# Patient Record
Sex: FEMALE | Race: WHITE | NOT HISPANIC OR LATINO | Employment: OTHER | URBAN - METROPOLITAN AREA
[De-identification: names, ages, dates, MRNs, and addresses within clinical notes are randomized per-mention and may not be internally consistent; named-entity substitution may affect disease eponyms.]

---

## 2017-03-24 DIAGNOSIS — M85.80 OSTEOPENIA: ICD-10-CM

## 2017-03-24 NOTE — TELEPHONE ENCOUNTER
Fosamax      Last Written Prescription Date: 3/21/2016  Last Fill Quantity: 4, # refills: 11  Last Office Visit with [facility], [facility] or [facility] prescribing provider: 11/7/2016     BP Readings from Last 3 Encounters:   11/07/16 132/68   10/30/16 121/52   10/24/16 134/70     Date of last Breast Exam: 4/2016

## 2017-03-27 DIAGNOSIS — E78.5 HYPERLIPIDEMIA LDL GOAL <100: ICD-10-CM

## 2017-03-27 DIAGNOSIS — E11.65 TYPE 2 DIABETES MELLITUS WITH HYPERGLYCEMIA, UNSPECIFIED LONG TERM INSULIN USE STATUS: ICD-10-CM

## 2017-03-28 RX ORDER — ALENDRONATE SODIUM 70 MG/1
70 TABLET ORAL
Qty: 4 TABLET | Refills: 6 | Status: SHIPPED | OUTPATIENT
Start: 2017-03-28 | End: 2017-10-06

## 2017-03-28 NOTE — TELEPHONE ENCOUNTER
Prescription approved per [facility] Refill Protocol.    Trenton Haro RN, BSN

## 2017-03-29 RX ORDER — SIMVASTATIN 10 MG
TABLET ORAL
Qty: 30 TABLET | Refills: 0 | Status: SHIPPED | OUTPATIENT
Start: 2017-03-29 | End: 2017-04-19

## 2017-03-29 NOTE — TELEPHONE ENCOUNTER
simvastatin (ZOCOR) 10 MG tablet   Last Written Prescription Date: 03/01/2017  Last Fill Quantity: 30, # refills: 0  Last Office Visit with [facility], [facility] or [facility] prescribing provider: 11/07/2016       Lab Results   Component Value Date    CHOL 139 01/20/2016     Lab Results   Component Value Date    HDL 42 01/20/2016     Lab Results   Component Value Date    LDL 54 01/20/2016     Lab Results   Component Value Date    TRIG 214 01/20/2016     Lab Results   Component Value Date    CHOLHDLRATIO 4.6 11/11/2015

## 2017-04-13 ENCOUNTER — TELEPHONE (OUTPATIENT)
Dept: INTERNAL MEDICINE | Facility: CLINIC | Age: 67
End: 2017-04-13

## 2017-04-13 DIAGNOSIS — E11.65 TYPE 2 DIABETES MELLITUS WITH HYPERGLYCEMIA, WITHOUT LONG-TERM CURRENT USE OF INSULIN (H): ICD-10-CM

## 2017-04-13 DIAGNOSIS — Z12.11 SPECIAL SCREENING FOR MALIGNANT NEOPLASMS, COLON: ICD-10-CM

## 2017-04-13 DIAGNOSIS — Z11.59 NEED FOR HEPATITIS C SCREENING TEST: Primary | ICD-10-CM

## 2017-04-13 DIAGNOSIS — E78.5 HYPERLIPIDEMIA LDL GOAL <100: ICD-10-CM

## 2017-04-13 NOTE — TELEPHONE ENCOUNTER
Reason for Call:  Other call back    Detailed comments: Pt received a letter stating she's due for a mammo. Last time she saw Dr. Elizondo he stated that she was good and didn't need another one for 10 years. Can someone call her and advise if this is something she has to do?     Phone Number Patient can be reached at: Home number on file [phone] (home)    Best Time: anytime    Can we leave a detailed message on this number? YES    Call taken on 4/13/2017 at 3:06 PM by Amanda Reyna

## 2017-04-19 DIAGNOSIS — E78.5 HYPERLIPIDEMIA LDL GOAL <100: ICD-10-CM

## 2017-04-19 DIAGNOSIS — Z12.11 SPECIAL SCREENING FOR MALIGNANT NEOPLASMS, COLON: ICD-10-CM

## 2017-04-19 DIAGNOSIS — E11.65 TYPE 2 DIABETES MELLITUS WITH HYPERGLYCEMIA, UNSPECIFIED LONG TERM INSULIN USE STATUS: ICD-10-CM

## 2017-04-19 DIAGNOSIS — Z11.59 NEED FOR HEPATITIS C SCREENING TEST: ICD-10-CM

## 2017-04-19 DIAGNOSIS — E11.65 TYPE 2 DIABETES MELLITUS WITH HYPERGLYCEMIA, WITHOUT LONG-TERM CURRENT USE OF INSULIN (H): ICD-10-CM

## 2017-04-19 LAB
CHOLEST SERPL-MCNC: 152 MG/DL
HBA1C MFR BLD: 7.1 % (ref 4.3–6)
HCV AB SERPL QL IA: NORMAL
HDLC SERPL-MCNC: 43 MG/DL
LDLC SERPL CALC-MCNC: 46 MG/DL
NONHDLC SERPL-MCNC: 109 MG/DL
TRIGL SERPL-MCNC: 314 MG/DL

## 2017-04-19 PROCEDURE — 80061 LIPID PANEL: CPT | Performed by: INTERNAL MEDICINE

## 2017-04-19 PROCEDURE — 83036 HEMOGLOBIN GLYCOSYLATED A1C: CPT | Mod: QW | Performed by: INTERNAL MEDICINE

## 2017-04-19 PROCEDURE — 86803 HEPATITIS C AB TEST: CPT | Performed by: INTERNAL MEDICINE

## 2017-04-19 PROCEDURE — 36415 COLL VENOUS BLD VENIPUNCTURE: CPT | Performed by: INTERNAL MEDICINE

## 2017-04-19 RX ORDER — SIMVASTATIN 10 MG
10 TABLET ORAL AT BEDTIME
Qty: 30 TABLET | Refills: 11 | Status: SHIPPED | OUTPATIENT
Start: 2017-04-19 | End: 2018-03-08

## 2017-04-19 NOTE — PROGRESS NOTES
Please contact the patient and notify her of the following:  The hemoglobin A1c is minimally elevated at 7.1.  Cholesterol was excellent with an LDL of 46. Triglycerides were slightly high at 314. Much better than previous however.    Thank you.  DO ROLO Elias

## 2017-04-20 NOTE — PROGRESS NOTES
Please contact the patient and notify her of the following:  The hepatitis C antibody is negative.    Thank you.  DO ROLO Elias

## 2017-05-01 DIAGNOSIS — E11.65 TYPE 2 DIABETES MELLITUS WITH HYPERGLYCEMIA, WITHOUT LONG-TERM CURRENT USE OF INSULIN (H): ICD-10-CM

## 2017-05-02 NOTE — TELEPHONE ENCOUNTER
Routing refill request to provider for review/approval because:  Labs out of range:  Cr 1.14  Nona Reyna RN

## 2017-05-02 NOTE — TELEPHONE ENCOUNTER
metformin         Last Written Prescription Date: 11/9/16  Last Fill Quantity: 270, # refills: 1  Last Office Visit with [facility], [facility] or [facility] prescribing provider:  11/7/16        BP Readings from Last 3 Encounters:   11/07/16 132/68   10/30/16 121/52   10/24/16 134/70     Lab Results   Component Value Date    MICROL 21 03/21/2016     Lab Results   Component Value Date    UMALCR 13.99 03/21/2016     Creatinine   Date Value Ref Range Status   10/29/2016 1.14 (H) 0.52 - 1.04 mg/dL Final   ]  GFR Estimate   Date Value Ref Range Status   10/29/2016 47 (L) >60 mL/min/1.7m2 Final     Comment:     Non  GFR Calc   03/21/2016 58 (L) >60 mL/min/1.7m2 Final     Comment:     Non  GFR Calc   11/11/2015 72 >60 mL/min/1.7m2 Final     Comment:     Non  GFR Calc     GFR Estimate If Black   Date Value Ref Range Status   10/29/2016 57 (L) >60 mL/min/1.7m2 Final     Comment:      GFR Calc   03/21/2016 70 >60 mL/min/1.7m2 Final     Comment:      GFR Calc   11/11/2015 87 >60 mL/min/1.7m2 Final     Comment:      GFR Calc     Lab Results   Component Value Date    CHOL 152 04/19/2017     Lab Results   Component Value Date    HDL 43 04/19/2017     Lab Results   Component Value Date    LDL 46 04/19/2017     Lab Results   Component Value Date    TRIG 314 04/19/2017     Lab Results   Component Value Date    CHOLHDLRATIO 4.6 11/11/2015     Lab Results   Component Value Date    AST 19 11/11/2015     Lab Results   Component Value Date    ALT 31 11/11/2015     Lab Results   Component Value Date    A1C 7.1 04/19/2017    A1C 6.8 10/11/2016    A1C 6.7 03/21/2016    A1C 7.2 11/11/2015    A1C 7.9 02/19/2015     Potassium   Date Value Ref Range Status   10/29/2016 4.3 3.4 - 5.3 mmol/L Final       lisinopril      Last Written Prescription Date: 10/6/16  Last Fill Quantity: 30, # refills: 2  Last Office Visit with [facility], [facility] or [facility] prescribing provider:  11/7/16       Potassium   Date Value Ref Range Status   10/29/2016 4.3 3.4 - 5.3 mmol/L Final     Creatinine   Date Value Ref Range Status   10/29/2016 1.14 (H) 0.52 - 1.04 mg/dL Final     BP Readings from Last 3 Encounters:   11/07/16 132/68   10/30/16 121/52   10/24/16 134/70     Sofi Lott MA     5/2/2017

## 2017-05-03 PROBLEM — E11.65 TYPE 2 DIABETES MELLITUS WITH HYPERGLYCEMIA, WITHOUT LONG-TERM CURRENT USE OF INSULIN (H): Status: ACTIVE | Noted: 2017-05-03

## 2017-05-03 RX ORDER — LISINOPRIL 10 MG/1
TABLET ORAL
Qty: 30 TABLET | Refills: 5 | Status: SHIPPED | OUTPATIENT
Start: 2017-05-03 | End: 2017-10-29

## 2017-05-03 RX ORDER — METFORMIN HCL 500 MG
TABLET, EXTENDED RELEASE 24 HR ORAL
Qty: 270 TABLET | Refills: 1 | Status: SHIPPED | OUTPATIENT
Start: 2017-05-03 | End: 2017-10-29

## 2017-05-08 PROCEDURE — G0328 FECAL BLOOD SCRN IMMUNOASSAY: HCPCS | Performed by: INTERNAL MEDICINE

## 2017-05-09 DIAGNOSIS — Z12.11 SPECIAL SCREENING FOR MALIGNANT NEOPLASMS, COLON: ICD-10-CM

## 2017-05-09 LAB — HEMOCCULT STL QL IA: NEGATIVE

## 2017-05-22 NOTE — PROGRESS NOTES
Please contact the patient and notify her of the following:  There were no signs of blood in her stool.    Thank you.  DO ROLO Elias

## 2017-06-26 DIAGNOSIS — E78.5 HYPERLIPIDEMIA LDL GOAL <100: ICD-10-CM

## 2017-06-26 NOTE — TELEPHONE ENCOUNTER
amLODIPine (NORVASC) 5 MG tablet      Last Written Prescription Date: 10/6/16  Last Fill Quantity: 90, # refills: 0    Last Office Visit with [facility], [facility] or [facility] prescribing provider:  11/7/16   Future Office Visit:        BP Readings from Last 3 Encounters:   11/07/16 132/68   10/30/16 121/52   10/24/16 134/70

## 2017-06-27 RX ORDER — AMLODIPINE BESYLATE 5 MG/1
5 TABLET ORAL DAILY
Qty: 90 TABLET | Refills: 0 | Status: SHIPPED | OUTPATIENT
Start: 2017-06-27 | End: 2017-09-25

## 2017-09-25 DIAGNOSIS — E78.5 HYPERLIPIDEMIA LDL GOAL <100: ICD-10-CM

## 2017-09-25 RX ORDER — AMLODIPINE BESYLATE 5 MG/1
TABLET ORAL
Qty: 90 TABLET | Refills: 0 | Status: SHIPPED | OUTPATIENT
Start: 2017-09-25 | End: 2017-12-19

## 2017-09-25 NOTE — TELEPHONE ENCOUNTER
Routing refill request to provider for review/approval because:  Drug not on the [facility] refill protocol for associated diagnosis    Wanda Galvan RN  [facility]

## 2017-09-25 NOTE — TELEPHONE ENCOUNTER
Amlodipine      Last Written Prescription Date: 6/27/17  Last Fill Quantity: 90, # refills: 0    Last Office Visit with [facility], [facility] or [facility] prescribing provider:  11/7/16   Future Office Visit:        BP Readings from Last 3 Encounters:   11/07/16 132/68   10/30/16 121/52   10/24/16 134/70

## 2017-10-06 DIAGNOSIS — M85.89 OSTEOPENIA OF MULTIPLE SITES: ICD-10-CM

## 2017-10-06 NOTE — TELEPHONE ENCOUNTER
Fosamax    Last Written Prescription Date: 3/28/2017  Last Fill Quantity: 4 tablets, # refills: 6  Last Office Visit with [facility], [facility] or [facility] prescribing provider: 11/07/2016       DEXA Scan:  Last order of DX HIP/PELVIS/SPINE was found on 4/8/2015 from Hospital Encounter on 4/8/2015     No order of DX HIP/PELVIS/SPINE W LAT FRACTION ANALYSIS is found.       Creatinine   Date Value Ref Range Status   10/29/2016 1.14 (H) 0.52 - 1.04 mg/dL Final

## 2017-10-10 NOTE — TELEPHONE ENCOUNTER
Routing refill request to provider for review/approval because:  Labs out of range:  Creatinine  Labs not current:  DEXA scan    Wanda Galvan RN  [facility]

## 2017-10-11 PROBLEM — M85.89 OSTEOPENIA OF MULTIPLE SITES: Status: ACTIVE | Noted: 2017-10-11

## 2017-10-11 RX ORDER — ALENDRONATE SODIUM 70 MG/1
70 TABLET ORAL
Qty: 4 TABLET | Refills: 1 | Status: SHIPPED | OUTPATIENT
Start: 2017-10-11 | End: 2017-12-11

## 2017-10-25 ENCOUNTER — TRANSFERRED RECORDS (OUTPATIENT)
Dept: HEALTH INFORMATION MANAGEMENT | Facility: CLINIC | Age: 67
End: 2017-10-25

## 2017-10-26 ENCOUNTER — TELEPHONE (OUTPATIENT)
Dept: INTERNAL MEDICINE | Facility: CLINIC | Age: 67
End: 2017-10-26

## 2017-10-26 DIAGNOSIS — D48.5 NEOPLASM OF UNCERTAIN BEHAVIOR OF SKIN: Primary | ICD-10-CM

## 2017-10-26 NOTE — TELEPHONE ENCOUNTER
Reason for Call:  Other call back    Detailed comments: Patient called and is requesting a call back from Dr. Elizondo's nurse. She states she has a question about a pimple/wart on the side of her nose and is wondering if she should see Dr. Elizondo or a dermatologist. Please call her back at your earliest convenience.     Phone Number Patient can be reached at: Home number on file [phone] (home)    Best Time: any     Can we leave a detailed message on this number? YES    Call taken on 10/26/2017 at 10:02 AM by Jet Frankel

## 2017-10-26 NOTE — TELEPHONE ENCOUNTER
Appointment request form completed and faxed with recent clinic notes to [facility] , they will contact patient to set up appointment. Yasemin,

## 2017-10-26 NOTE — TELEPHONE ENCOUNTER
Mera is calling back and she spoke with her insurance company and they said [email or web address] in [place] was in-network, but the dermatologist is not at that location, however she is in [place].  She  Spoke with them at the [facility] in [place] and they need a referral sent to them so they can see her.      The phone number there is [phone], this is to the dermatology office directly.    Thank you,  Yaima HUA

## 2017-10-26 NOTE — TELEPHONE ENCOUNTER
Given the location of the lesion on her face, I would have to see her before I could answer this question. Ultimately, if she can get into a dermatologist in a timely fashion, this would probably be the best answer.    Caro

## 2017-10-29 DIAGNOSIS — E11.65 TYPE 2 DIABETES MELLITUS WITH HYPERGLYCEMIA, WITHOUT LONG-TERM CURRENT USE OF INSULIN (H): ICD-10-CM

## 2017-10-31 RX ORDER — LISINOPRIL 10 MG/1
TABLET ORAL
Qty: 30 TABLET | Refills: 0 | Status: SHIPPED | OUTPATIENT
Start: 2017-10-31 | End: 2017-11-26

## 2017-10-31 RX ORDER — METFORMIN HCL 500 MG
TABLET, EXTENDED RELEASE 24 HR ORAL
Qty: 90 TABLET | Refills: 0 | Status: SHIPPED | OUTPATIENT
Start: 2017-10-31 | End: 2017-12-04

## 2017-10-31 NOTE — TELEPHONE ENCOUNTER
Requested Prescriptions   Pending Prescriptions Disp Refills     lisinopril (PRINIVIL/ZESTRIL) 10 MG tablet [Pharmacy Med Name: LISINOPRIL 10MG TABS] 30 tablet 5     Sig: TAKE ONE TABLET BY MOUTH EVERY DAY    ACE Inhibitors (Including Combos) Protocol Failed    10/30/2017  9:59 AM       Failed - Normal serum creatinine on file in past 12 months    Recent Labs   Lab Test  10/29/16   2102   CR  1.14*            Failed - Normal serum potassium on file in past 12 months    Recent Labs   Lab Test  10/29/16   2102   POTASSIUM  4.3            Passed - Blood pressure under 140/90    BP Readings from Last 3 Encounters:   11/07/16 132/68   10/30/16 121/52   10/24/16 134/70                Passed - Recent or future visit with authorizing provider's specialty    Patient had office visit in the last year or has a visit in the next 30 days with authorizing provider.  See chart review.              Passed - Patient is age 18 or older       Passed - No active pregnancy on record       Passed - No positive pregnancy test in past 12 months        metFORMIN (GLUCOPHAGE-XR) 500 MG 24 hr tablet [Pharmacy Med Name: METFORMIN HCL ER 500MG TB24] 270 tablet 1     Sig: TAKE THREE TABLETS BY MOUTH EVERY DAY WITH DINNER    Biguanide Agents Failed    10/30/2017  9:59 AM       Failed - Patient's BP is less than 140/90    BP Readings from Last 3 Encounters:   11/07/16 132/68   10/30/16 121/52   10/24/16 134/70                Failed - Patient has had a Microalbumin in the past 12 mos.    Recent Labs   Lab Test  03/21/16   1433   MICROL  21   UMALCR  13.99            Failed - Patient has documented A1c within the specified period of time.    Recent Labs   Lab Test  04/19/17   0837   A1C  7.1*            Failed - Recent (6 mos) or future visit with authorizing provider's specialty    Patient had office visit in the last 6 months or has a visit in the next 30 days with authorizing provider.  See chart review.            Passed - Patient has documented  LDL within the past 12 mos.    Recent Labs   Lab Test  04/19/17   0837   LDL  46            Passed - Patient is age 10 or older       Passed - Patient's CR is NOT>1.4 OR Patient's EGFR is NOT<45 within past 12 mos.    Recent Labs   Lab Test  10/29/16   2102   GFRESTIMATED  47*   GFRESTBLACK  57*       Recent Labs   Lab Test  10/29/16   2102   CR  1.14*            Passed - Patient does NOT have a diagnosis of CHF.       Passed - Patient is not pregnant       Passed - Patient has not had a positive pregnancy test within the past 12 mos.

## 2017-10-31 NOTE — TELEPHONE ENCOUNTER
Lisinopril  Routing refill request to provider for review/approval because:  Labs out of range:  Creatinine  Labs not current:  Creatinine, Potassium    Metformin  Routing refill request to provider for review/approval because:  Labs not current:  Metformin, creatinine, A1C    Wanda Galvan RN  [facility]

## 2017-11-07 ENCOUNTER — TELEPHONE (OUTPATIENT)
Dept: INTERNAL MEDICINE | Facility: CLINIC | Age: 67
End: 2017-11-07

## 2017-11-07 NOTE — TELEPHONE ENCOUNTER
Reason for Call:  Other call back    Detailed comments: patient is calling stating that she called Dermatology for a pimple or mole on her face and they can't get her in for 2 months or so. She is wondering if Dr. Elizondo would like to see her first since that is so long from now? Requesting to speak with Katina. Please advise     Phone Number Patient can be reached at: Home number on file [phone] (home)    Best Time: any    Can we leave a detailed message on this number? YES    Call taken on 11/7/2017 at 10:37 AM by Shania Reyes

## 2017-11-07 NOTE — TELEPHONE ENCOUNTER
I have contacted pt and scheduled her for 11/08/2017 in [place]. Christal Brown CMA ([facility])

## 2017-11-08 ENCOUNTER — OFFICE VISIT (OUTPATIENT)
Dept: FAMILY MEDICINE | Facility: OTHER | Age: 67
End: 2017-11-08
Payer: COMMERCIAL

## 2017-11-08 VITALS
HEIGHT: 64 IN | HEART RATE: 72 BPM | SYSTOLIC BLOOD PRESSURE: 132 MMHG | DIASTOLIC BLOOD PRESSURE: 82 MMHG | BODY MASS INDEX: 30.9 KG/M2 | TEMPERATURE: 97.1 F | WEIGHT: 181 LBS | OXYGEN SATURATION: 96 %

## 2017-11-08 DIAGNOSIS — L98.9 SKIN LESION: ICD-10-CM

## 2017-11-08 DIAGNOSIS — E11.65 TYPE 2 DIABETES MELLITUS WITH HYPERGLYCEMIA, WITHOUT LONG-TERM CURRENT USE OF INSULIN (H): ICD-10-CM

## 2017-11-08 DIAGNOSIS — Z85.828 HISTORY OF BASAL CELL CARCINOMA: Primary | ICD-10-CM

## 2017-11-08 DIAGNOSIS — Z71.89 ADVANCED DIRECTIVES, COUNSELING/DISCUSSION: ICD-10-CM

## 2017-11-08 DIAGNOSIS — I10 BENIGN ESSENTIAL HYPERTENSION: ICD-10-CM

## 2017-11-08 PROCEDURE — 99213 OFFICE O/P EST LOW 20 MIN: CPT | Performed by: INTERNAL MEDICINE

## 2017-11-08 ASSESSMENT — PAIN SCALES - GENERAL: PAINLEVEL: NO PAIN (0)

## 2017-11-08 NOTE — NURSING NOTE
"Chief Complaint   Patient presents with     Derm Problem     spot on face       Initial /82 (BP Location: Left arm, Patient Position: Chair, Cuff Size: Adult Regular)  Pulse 72  Temp 97.1  F (36.2  C) (Temporal)  Ht 5' 4\" (1.626 m)  Wt 181 lb (82.1 kg)  SpO2 96%  BMI 31.07 kg/m2 Estimated body mass index is 31.07 kg/(m^2) as calculated from the following:    Height as of this encounter: 5' 4\" (1.626 m).    Weight as of this encounter: 181 lb (82.1 kg).  Medication Reconciliation: complete  Katina GARNER    "

## 2017-11-08 NOTE — PROGRESS NOTES
Chief Complaint   Patient presents with     Derm Problem     spot on face     Chief complaint: Growth on nose    HPI:  Mera is a pleasant 67 year old female with history of basal cell carcinoma is presenting today with concerns over a growth on her face. The lesion is a 2x2 cm nodular growth with rolled pearly border and necrotic center on the right nasolabial fold of her nose. It has been insidiously getting larger despite use of several different topical OTC treatments and manipulation. Patient has a history of a similar growth in the past which was biopsied and found to be benign. She attempted to get into a dermatologist office, but was told that it could take up to 3 months. Denies any constitutional symptoms or other lesions of concern. We will defer lab work to next appointment.   With respect to her diabetes and hypertension, variable control at this time. Her most recent glycosylated hemoglobin was a comfortable 7.1. Blood pressure today is 132/82 and she is tolerating her medications, including her ACE inhibitor, statin, aspirin without difficulty.    Past Medical History:   Diagnosis Date     Endometrial cells on cervical Pap smear inconsistent w/LMP 11/11/15    pap NIL/neg       Past Surgical History:   Procedure Laterality Date     DILATION AND CURETTAGE, OPERATIVE HYSTEROSCOPY, COMBINED N/A 2/16/2016    Procedure: COMBINED DILATION AND CURETTAGE, OPERATIVE HYSTEROSCOPY;  Surgeon: Jenae Walden MD;  Location:  OR      FLEX SIGMOIDOSCOPY W/WO DELILAH SPEC BY BRUSH/WASH  1997     HC REVISE MEDIAN N/CARPAL TUNNEL SURG  1985?      Family History   Problem Relation Age of Onset     Breast Cancer Sister      Cancer - colorectal Mother      Cardiovascular Father      QUADRUPLE BYPASS IN 1992      Social History   Substance Use Topics     Smoking status: Never Smoker     Smokeless tobacco: Never Used     Alcohol use No           Review of Systems:   ENDO: Denies dizzy, shakes, temperature  "intolerance.    LUNGS: Pt denies cough, excess sputum, hemoptysis, or shortness of breath.   HEART: Pt denies chest pain, arrhythmia, syncope, tachy or bradyarrhythmia or excess edema.   SKIN: Pt denies itching, rashes, discoloration. Growth on right nasolabial fold unresponsive to topical OTC treatments.    MS: Pt denies significant joint pain, swelling, or acute loss of function. Able to ambulate with little or no assistance.     Examination:  B/P: 132/82 T:97.1 P:72 R:Data Unavailable [unfilled] 181 lbs 0 oz 5' 4\"     Constitutional: The patient appears to be in no acute distress. The patient appears to be adequately hydrated. No acute respiratory or hemodynamic distress is noted at this time.    LUNGS: clear bilaterally, airflow is brisk, no intercostal retraction or stridor is noted. No coughing in noted during visit.    HEART: regular without rubs, clicks, gallops or murmurs. PMI is non-displaced. Upstrokes are brisk. S1, 2 are heard.    NEURO: PT is alert and appropriate. No neurologic lateralization is noted. Cranial nerves 2-12 are intact. Peripheral sensory and motor function are grossly normal.   PSYCH: The patient appears grossly appropriate. Maintain good eye contact, does not have any jittery or atypical motion. Displays appropriate affect.    SKIN: warm and dry. No erythema, or rashes are noted. 2x2 cm nodular growth with pearly rolled border and necrotic center in right nasolabial fold.       Decision Makin. History of basal cell carcinoma  Patient has history of BCC, referral to dermatology for excisional biopsy on 2017.   - DERMATOLOGY REFERRAL    2. Advanced directives, counseling/discussion  Discussed importance of having a written set of preferences in the event of illness/injury.     3. Skin lesion  As above.  - DERMATOLOGY REFERRAL    4. Benign essential hypertension  Controlled on current medication    5. Type 2 diabetes mellitus with hyperglycemia, without long-term current use of " insulin (H)  Stable, continue current medication, continue dietary restriction      Follow up:    I have asked the patient to schedule a follow up appointment with me in 3 months to assess diabetic management.     I have carefully explained the diagnosis and treatment options with the patient. The patient has displayed an understanding of the above, and all subsequent questions were answered.         I have personally interviewed, examined, diagnosed, and established a plan of care for this patient with the assistance of;  DO ROLO Hernandez    Portions of this note were produced using [employer]  Although every attempt at real-time proof reading has been made, occasional grammar/syntax errors may have been missed.

## 2017-11-08 NOTE — MR AVS SNAPSHOT
After Visit Summary   11/8/2017    Mera Valdivia    MRN: [id number]           Patient Information     Date Of Birth          1950        Visit Information        Provider Department      11/8/2017 11:00 AM William Elizondo DO [facility]        Today's Diagnoses     History of basal cell carcinoma    -  1    Advanced directives, counseling/discussion        Skin lesion           Follow-ups after your visit        Additional Services     DERMATOLOGY REFERRAL       Your provider has referred you to: [facility]: [facility] [phone]   [email or web address]    Please be aware that coverage of these services is subject to the terms and limitations of your health insurance plan.  Call member services at your health plan with any benefit or coverage questions.      Please bring the following with you to your appointment:    (1) Any X-Rays, CTs or MRIs which have been performed.  Contact the facility where they were done to arrange for  prior to your scheduled appointment.    (2) List of current medications  (3) This referral request   (4) Any documents/labs given to you for this referral                  Your next 10 appointments already scheduled     Nov 27, 2017  1:30 PM CST   New Visit with Antony Mistry MD   [facility]    [address] [address] [phone]              Who to contact     If you have questions or need follow up information about today's clinic visit or your schedule please contact [facility] directly at [phone].  Normal or non-critical lab and imaging results will be communicated to you by MyChart, letter or phone within 4 business days after the clinic has received the results. If you do not hear from us within 7 days, please contact the clinic through MyChart or phone. If you have a critical or abnormal lab  "result, we will notify you by phone as soon as possible.  Submit refill requests through [employer] or call your pharmacy and they will forward the refill request to us. Please allow 3 business days for your refill to be completed.          Additional Information About Your Visit        [employer] Information     [employer] lets you send messages to your doctor, view your test results, renew your prescriptions, schedule appointments and more. To sign up, go to [email or web address] . Click on \"Log in\" on the left side of the screen, which will take you to the Welcome page. Then click on \"Sign up Now\" on the right side of the page.     You will be asked to enter the access code listed below, as well as some personal information. Please follow the directions to create your username and password.     Your access code is: [email or web address]  Expires: 2018 11:37 AM     Your access code will  in 90 days. If you need help or a new code, please call your [place] clinic or [phone].        Care EveryWhere ID     This is your Care EveryWhere ID. This could be used by other organizations to access your [place] medical records  [id number]        Your Vitals Were     Pulse Temperature Height Pulse Oximetry BMI (Body Mass Index)       72 97.1  F (36.2  C) (Temporal) 5' 4\" (1.626 m) 96% 31.07 kg/m2        Blood Pressure from Last 3 Encounters:   17 132/82   16 132/68   10/30/16 121/52    Weight from Last 3 Encounters:   17 181 lb (82.1 kg)   16 181 lb (82.1 kg)   10/29/16 179 lb 10.8 oz (81.5 kg)              We Performed the Following     DERMATOLOGY REFERRAL        Primary Care Provider Office Phone # Fax #    William Antony Elizondo [phone] [address] [place] [address]        Equal Access to Services     [facility] AH: Fransisco Levin, wamireilleda wm, qaybantoni forbes ah. So [facility] " [phone].    ATENCIÓN: Si carley frias, tiene a woods disposición servicios gratuitos de asistencia lingüística. Aston ramos [phone].    We comply with applicable federal civil rights laws and [place] laws. We do not discriminate on the basis of race, color, national origin, age, disability, sex, sexual orientation, or gender identity.            Thank you!     Thank you for choosing [facility]  for your care. Our goal is always to provide you with excellent care. Hearing back from our patients is one way we can continue to improve our services. Please take a few minutes to complete the written survey that you may receive in the mail after your visit with us. Thank you!             Your Updated Medication List - Protect others around you: Learn how to safely use, store and throw away your medicines at www.disposemymeds.org.          This list is accurate as of: 11/8/17 11:37 AM.  Always use your most recent med list.                   Brand Name Dispense Instructions for use Diagnosis    acetaminophen 325 MG tablet    TYLENOL    100 tablet    Take 2 tablets (650 mg) by mouth every 4 hours as needed for mild pain        alendronate 70 MG tablet    FOSAMAX    4 tablet    Take 1 tablet (70 mg) by mouth every 7 days Take with over 8 ounces water and stay upright for at least 30 minutes after dose.  Take at least 60 minutes before breakfast    Osteopenia of multiple sites       amLODIPine 5 MG tablet    NORVASC    90 tablet    TAKE ONE TABLET BY MOUTH EVERY DAY    Hyperlipidemia LDL goal <100       aspirin 81 MG EC tablet     90 tablet    Take 1 tablet (81 mg) by mouth daily    Essential hypertension       calcium-vitamin D 600-400 MG-UNIT per tablet    CALTRATE     Take 1 tablet by mouth 2 times daily        * lisinopril 10 MG tablet    PRINIVIL/ZESTRIL    30 tablet    TAKE ONE TABLET BY MOUTH EVERY DAY    Hyperlipidemia LDL goal <100       * lisinopril 10 MG tablet    PRINIVIL/ZESTRIL    30 tablet     TAKE ONE TABLET BY MOUTH EVERY DAY    Type 2 diabetes mellitus with hyperglycemia, without long-term current use of insulin (H)       metFORMIN 500 MG 24 hr tablet    GLUCOPHAGE-XR    90 tablet    TAKE THREE TABLETS BY MOUTH EVERY DAY WITH DINNER    Type 2 diabetes mellitus with hyperglycemia, without long-term current use of insulin (H)       simvastatin 10 MG tablet    ZOCOR    30 tablet    Take 1 tablet (10 mg) by mouth At Bedtime    Type 2 diabetes mellitus with hyperglycemia, unspecified long term insulin use status (H), Hyperlipidemia LDL goal <100       * Notice:  This list has 2 medication(s) that are the same as other medications prescribed for you. Read the directions carefully, and ask your doctor or other care provider to review them with you.

## 2017-11-26 DIAGNOSIS — E11.65 TYPE 2 DIABETES MELLITUS WITH HYPERGLYCEMIA, WITHOUT LONG-TERM CURRENT USE OF INSULIN (H): ICD-10-CM

## 2017-11-27 ENCOUNTER — OFFICE VISIT (OUTPATIENT)
Dept: DERMATOLOGY | Facility: CLINIC | Age: 67
End: 2017-11-27
Payer: COMMERCIAL

## 2017-11-27 VITALS — OXYGEN SATURATION: 99 % | HEART RATE: 80 BPM | DIASTOLIC BLOOD PRESSURE: 77 MMHG | SYSTOLIC BLOOD PRESSURE: 146 MMHG

## 2017-11-27 DIAGNOSIS — Z85.828 HISTORY OF BASAL CELL CANCER: Primary | ICD-10-CM

## 2017-11-27 DIAGNOSIS — C44.311 BASAL CELL CARCINOMA OF RIGHT ALA NASI: ICD-10-CM

## 2017-11-27 DIAGNOSIS — L81.4 LENTIGO: ICD-10-CM

## 2017-11-27 DIAGNOSIS — L82.1 SK (SEBORRHEIC KERATOSIS): ICD-10-CM

## 2017-11-27 PROCEDURE — 99203 OFFICE O/P NEW LOW 30 MIN: CPT | Mod: 25 | Performed by: DERMATOLOGY

## 2017-11-27 PROCEDURE — 11100 CL FROZEN SECTION FIRST SPEC: CPT | Performed by: DERMATOLOGY

## 2017-11-27 PROCEDURE — 88331 PATH CONSLTJ SURG 1 BLK 1SPC: CPT | Performed by: DERMATOLOGY

## 2017-11-27 NOTE — MR AVS SNAPSHOT
After Visit Summary   11/27/2017    Mera Valdivia    MRN: [id number]           Patient Information     Date Of Birth          1950        Visit Information        Provider Department      11/27/2017 1:30 PM Antony Mistry MD [facility]        Today's Diagnoses     History of basal cell cancer    -  1    Basal cell carcinoma of right ala nasi        Lentigo        SK (seborrheic keratosis)          Care Instructions          Wound Care Instructions NOSE    FOR SUPERFICIAL WOUNDS     [facility] [phone]    [facility] [phone]                       AFTER 24 HOURS YOU SHOULD REMOVE THE BANDAGE AND BEGIN DAILY DRESSING CHANGES AS FOLLOWS:     1) Remove Dressing.     2) Clean and dry the area with tap water using a Q-tip or sterile gauze pad.     3) Apply Vaseline, Aquaphor, Polysporin ointment or Bacitracin ointment over entire wound.  Do NOT use Neosporin ointment.     4) Cover the wound with a band-aid, or a sterile non-stick gauze pad and micropore paper tape      REPEAT THESE INSTRUCTIONS AT LEAST ONCE A DAY UNTIL THE WOUND HAS COMPLETELY HEALED.    It is an old wives tale that a wound heals better when it is exposed to air and allowed to dry out. The wound will heal faster with a better cosmetic result if it is kept moist with ointment and covered with a bandage.    **Do not let the wound dry out.**      Supplies Needed:      *Cotton tipped applicators (Q-tips)    *Polysporin Ointment or Bacitracin Ointment (NOT NEOSPORIN)    *Band-aids or non-stick gauze pads and micropore paper tape.      PATIENT INFORMATION:    During the healing process you will notice a number of changes. All wounds develop a small halo of redness surrounding the wound.  This means healing is occurring. Severe itching with extensive redness usually indicates sensitivity to the ointment or bandage tape used to dress the wound.  You should call our office if this  develops.      Swelling  and/or discoloration around your surgical site is common, particularly when performed around the eye.    All wounds normally drain.  The larger the wound the more drainage there will be.  After 7-10 days, you will notice the wound beginning to shrink and new skin will begin to grow.  The wound is healed when you can see skin has formed over the entire area.  A healed wound has a healthy, shiny look to the surface and is red to dark pink in color to normalize.  Wounds may take approximately 4-6 weeks to heal.  Larger wounds may take 6-8 weeks.  After the wound is healed you may discontinue dressing changes.    You may experience a sensation of tightness as your wound heals. This is normal and will gradually subside.    Your healed wound may be sensitive to temperature changes. This sensitivity improves with time, but if you re having a lot of discomfort, try to avoid temperature extremes.    Patients frequently experience itching after their wound appears to have healed because of the continue healing under the skin.  Plain Vaseline will help relieve the itching.        POSSIBLE COMPLICATIONS    BLEEDIN. Leave the bandage in place.  2. Use tightly rolled up gauze or a cloth to apply direct pressure over the bandage for 30  minutes.  3. Reapply pressure for an additional 30 minutes if necessary  4. Use additional gauze and tape to maintain pressure once the bleeding has stopped.            Follow-ups after your visit        Who to contact     If you have questions or need follow up information about today's clinic visit or your schedule please contact [facility] directly at [phone].  Normal or non-critical lab and imaging results will be communicated to you by MyChart, letter or phone within 4 business days after the clinic has received the results. If you do not hear from us within 7 days, please contact the clinic through MyChart or phone. If you have a critical or  "abnormal lab result, we will notify you by phone as soon as possible.  Submit refill requests through [employer] or call your pharmacy and they will forward the refill request to us. Please allow 3 business days for your refill to be completed.          Additional Information About Your Visit        MyChart Information     [employer] lets you send messages to your doctor, view your test results, renew your prescriptions, schedule appointments and more. To sign up, go to [email or web address] . Click on \"Log in\" on the left side of the screen, which will take you to the Welcome page. Then click on \"Sign up Now\" on the right side of the page.     You will be asked to enter the access code listed below, as well as some personal information. Please follow the directions to create your username and password.     Your access code is: [email or web address]  Expires: 2018 11:37 AM     Your access code will  in 90 days. If you need help or a new code, please call your [place] clinic or [phone].        Care EveryWhere ID     This is your Care EveryWhere ID. This could be used by other organizations to access your [place] medical records  [id number]        Your Vitals Were     Pulse Pulse Oximetry                80 99%           Blood Pressure from Last 3 Encounters:   17 146/77   17 132/82   16 132/68    Weight from Last 3 Encounters:   17 82.1 kg (181 lb)   16 82.1 kg (181 lb)   10/29/16 81.5 kg (179 lb 10.8 oz)              We Performed the Following     BIOPSY SKIN/SUBQ/MUC MEM, SINGLE LESION     CL FROZEN SECTION FIRST SPEC        Primary Care Provider Office Phone # Fax #    William Antony Elizondo [phone]       [address] [place] [address]        Equal Access to Services     KIMBERLY PABLO : Fransisco jones Sorosmery, waaxda luqadaha, qaybta kaalmada adeegyada, waxay ingrid hancock. So [facility] [phone].    ATENCIÓN: Si habla " español, tiene a woods disposición servicios gratuitos de asistencia lingüística. Aston ramos [phone].    We comply with applicable federal civil rights laws and [place] laws. We do not discriminate on the basis of race, color, national origin, age, disability, sex, sexual orientation, or gender identity.            Thank you!     Thank you for choosing [facility]  for your care. Our goal is always to provide you with excellent care. Hearing back from our patients is one way we can continue to improve our services. Please take a few minutes to complete the written survey that you may receive in the mail after your visit with us. Thank you!             Your Updated Medication List - Protect others around you: Learn how to safely use, store and throw away your medicines at www.disposemymeds.org.          This list is accurate as of: 11/27/17  1:51 PM.  Always use your most recent med list.                   Brand Name Dispense Instructions for use Diagnosis    acetaminophen 325 MG tablet    TYLENOL    100 tablet    Take 2 tablets (650 mg) by mouth every 4 hours as needed for mild pain        alendronate 70 MG tablet    FOSAMAX    4 tablet    Take 1 tablet (70 mg) by mouth every 7 days Take with over 8 ounces water and stay upright for at least 30 minutes after dose.  Take at least 60 minutes before breakfast    Osteopenia of multiple sites       amLODIPine 5 MG tablet    NORVASC    90 tablet    TAKE ONE TABLET BY MOUTH EVERY DAY    Hyperlipidemia LDL goal <100       aspirin 81 MG EC tablet     90 tablet    Take 1 tablet (81 mg) by mouth daily    Essential hypertension       calcium-vitamin D 600-400 MG-UNIT per tablet    CALTRATE     Take 1 tablet by mouth 2 times daily        lisinopril 10 MG tablet    PRINIVIL/ZESTRIL    30 tablet    TAKE ONE TABLET BY MOUTH EVERY DAY    Type 2 diabetes mellitus with hyperglycemia, without long-term current use of insulin (H)       metFORMIN 500 MG 24 hr tablet     GLUCOPHAGE-XR    90 tablet    TAKE THREE TABLETS BY MOUTH EVERY DAY WITH DINNER    Type 2 diabetes mellitus with hyperglycemia, without long-term current use of insulin (H)       simvastatin 10 MG tablet    ZOCOR    30 tablet    Take 1 tablet (10 mg) by mouth At Bedtime    Type 2 diabetes mellitus with hyperglycemia, unspecified long term insulin use status (H), Hyperlipidemia LDL goal <100

## 2017-11-27 NOTE — PROGRESS NOTES
Mera Valdivia is a 67 year old year old female patient here today for growth on right cheek and ala.   .  Patient states this has been present for years.  Patient reports the following symptoms:  bleeding.  Patient reports the following previous treatments none.  Patient reports the following modifying factors none.  Associated symptoms: none.  Patient has no other skin complaints today.  Remainder of the HPI, Meds, PMH, Allergies, FH, and SH was reviewed in chart.    Pertinent Hx:   Basal cell carcinoma   Past Medical History:   Diagnosis Date     Endometrial cells on cervical Pap smear inconsistent w/LMP 11/11/15    pap NIL/neg        Past Surgical History:   Procedure Laterality Date     DILATION AND CURETTAGE, OPERATIVE HYSTEROSCOPY, COMBINED N/A 2/16/2016    Procedure: COMBINED DILATION AND CURETTAGE, OPERATIVE HYSTEROSCOPY;  Surgeon: Jenae Walden MD;  Location:  OR     HC FLEX SIGMOIDOSCOPY W/WO DELILAH SPEC BY BRUSH/WASH  1997     HC REVISE MEDIAN N/CARPAL TUNNEL SURG  1985?        Family History   Problem Relation Age of Onset     Breast Cancer Sister      Cancer - colorectal Mother      Cardiovascular Father      QUADRUPLE BYPASS IN 1992       Social History     Social History     Marital status:      Spouse name: N/A     Number of children: N/A     Years of education: N/A     Occupational History     Not on file.     Social History Main Topics     Smoking status: Never Smoker     Smokeless tobacco: Never Used     Alcohol use No     Drug use: No     Sexual activity: No     Other Topics Concern      Service No     Blood Transfusions No     Caffeine Concern No     diet pop, decaff cofee     Occupational Exposure No     Hobby Hazards No     Sleep Concern No     Stress Concern No     Weight Concern No     Special Diet No     Back Care No     Exercise Yes     Seat Belt Yes     Self-Exams No     Parent/Sibling W/ Cabg, Mi Or Angioplasty Before 65f 55m? No     Social History Narrative        Outpatient Encounter Prescriptions as of 11/27/2017   Medication Sig Dispense Refill     lisinopril (PRINIVIL/ZESTRIL) 10 MG tablet TAKE ONE TABLET BY MOUTH EVERY DAY 30 tablet 0     metFORMIN (GLUCOPHAGE-XR) 500 MG 24 hr tablet TAKE THREE TABLETS BY MOUTH EVERY DAY WITH DINNER 90 tablet 0     alendronate (FOSAMAX) 70 MG tablet Take 1 tablet (70 mg) by mouth every 7 days Take with over 8 ounces water and stay upright for at least 30 minutes after dose.  Take at least 60 minutes before breakfast 4 tablet 1     amLODIPine (NORVASC) 5 MG tablet TAKE ONE TABLET BY MOUTH EVERY DAY 90 tablet 0     simvastatin (ZOCOR) 10 MG tablet Take 1 tablet (10 mg) by mouth At Bedtime 30 tablet 11     acetaminophen (TYLENOL) 325 MG tablet Take 2 tablets (650 mg) by mouth every 4 hours as needed for mild pain 100 tablet      aspirin 81 MG EC tablet Take 1 tablet (81 mg) by mouth daily 90 tablet 3     calcium-vitamin D (CALTRATE) 600-400 MG-UNIT per tablet Take 1 tablet by mouth 2 times daily       No facility-administered encounter medications on file as of 11/27/2017.              Review Of Systems  Skin: As above  Eyes: negative  Ears/Nose/Throat: negative  Respiratory: No shortness of breath, dyspnea on exertion, cough, or hemoptysis  Cardiovascular: negative  Gastrointestinal: negative  Genitourinary: negative  Musculoskeletal: negative  Neurologic: negative  Psychiatric: negative  Hematologic/Lymphatic/Immunologic: negative  Endocrine: negative      O:   NAD, WDWN, Alert & Oriented, Mood & Affect wnl, Vitals stable   Here today alone   /77  Pulse 80  SpO2 99%   General appearance normal   Vitals stable   Alert, oriented and in no acute distress      Following lymph nodes palpated: Occipital, Cervical, Supraclavicular no lad   Stuck on papules and brown macules on trunk and ext    R ala/medial cheek 1.5cm pink pearly papule           The remainder of expanded problem focused exam was unremarkable; the following areas  were examined:  scalp/hair, conjunctiva/lids, face, neck, lips, chest, digits/nails, RUE, LUE.      Eyes: Conjunctivae/lids:Normal     ENT: Lips, buccal mucosa, tongue: normal    MSK:Normal    Cardiovascular: peripheral edema none    Pulm: Breathing Normal    Lymph Nodes: No Head and Neck Lymphadenopathy     Neuro/Psych: Orientation:Normal; Mood/Affect:Normal      MICRO:   R ala:Orthokeratosis of epidermis with a proliferation of nests of basaloid cells, with peripheral palisading and a haphazard arrangement in the center extending into the dermis, forming nodules.  The tumor cells have hyperchromatic nuclei. Poor cytoplasm and intercellular bridging.    A/P:  1. R ala/medial cheek r/o basal cell carcinoma   TANGENTIAL BIOPSY IN HOUSE:  After consent, anesthesia with LEC and prep, tangential excision performed and dx above confirmed with frozen section histology.  No complications and routine wound care.  Patient told result basal cell carcinoma scheudle    2. Seborrheic keratosis, lentigo, hx of basal cell carcinoma         BENIGN LESIONS DISCUSSED WITH PATIENT:  I discussed the specifics of tumor, prognosis, and genetics of benign lesions.  I explained that treatment of these lesions would be purely cosmetic and not medically neccessary.  I discussed with patient different removal options including excision, cautery and /or laser.      Nature and genetics of benign skin lesions dicussed with patient.  Signs and Symptoms of skin cancer discussed with patient.  Patient encouraged to perform monthly skin exams.  UV precautions reviewed with patient.  Skin care regimen reviewed with patient: Eliminate harsh soaps, i.e. Dial, zest, irsih spring; Mild soaps such as Cetaphil or Dove sensitive skin, avoid hot or cold showers, aggressive use of emollients including vanicream, cetaphil or cerave discussed with patient.    Risks of non-melanoma skin cancer discussed with patient   Return to clinic 6 months

## 2017-11-27 NOTE — PATIENT INSTRUCTIONS
Wound Care Instructions NOSE    FOR SUPERFICIAL WOUNDS     [facility] [phone]    [facility] [phone]                       AFTER 24 HOURS YOU SHOULD REMOVE THE BANDAGE AND BEGIN DAILY DRESSING CHANGES AS FOLLOWS:     1) Remove Dressing.     2) Clean and dry the area with tap water using a Q-tip or sterile gauze pad.     3) Apply Vaseline, Aquaphor, Polysporin ointment or Bacitracin ointment over entire wound.  Do NOT use Neosporin ointment.     4) Cover the wound with a band-aid, or a sterile non-stick gauze pad and micropore paper tape      REPEAT THESE INSTRUCTIONS AT LEAST ONCE A DAY UNTIL THE WOUND HAS COMPLETELY HEALED.    It is an old wives tale that a wound heals better when it is exposed to air and allowed to dry out. The wound will heal faster with a better cosmetic result if it is kept moist with ointment and covered with a bandage.    **Do not let the wound dry out.**      Supplies Needed:      *Cotton tipped applicators (Q-tips)    *Polysporin Ointment or Bacitracin Ointment (NOT NEOSPORIN)    *Band-aids or non-stick gauze pads and micropore paper tape.      PATIENT INFORMATION:    During the healing process you will notice a number of changes. All wounds develop a small halo of redness surrounding the wound.  This means healing is occurring. Severe itching with extensive redness usually indicates sensitivity to the ointment or bandage tape used to dress the wound.  You should call our office if this develops.      Swelling  and/or discoloration around your surgical site is common, particularly when performed around the eye.    All wounds normally drain.  The larger the wound the more drainage there will be.  After 7-10 days, you will notice the wound beginning to shrink and new skin will begin to grow.  The wound is healed when you can see skin has formed over the entire area.  A healed wound has a healthy, shiny look to the surface and is red to dark pink in  color to normalize.  Wounds may take approximately 4-6 weeks to heal.  Larger wounds may take 6-8 weeks.  After the wound is healed you may discontinue dressing changes.    You may experience a sensation of tightness as your wound heals. This is normal and will gradually subside.    Your healed wound may be sensitive to temperature changes. This sensitivity improves with time, but if you re having a lot of discomfort, try to avoid temperature extremes.    Patients frequently experience itching after their wound appears to have healed because of the continue healing under the skin.  Plain Vaseline will help relieve the itching.        POSSIBLE COMPLICATIONS    BLEEDIN. Leave the bandage in place.  2. Use tightly rolled up gauze or a cloth to apply direct pressure over the bandage for 30  minutes.  3. Reapply pressure for an additional 30 minutes if necessary  4. Use additional gauze and tape to maintain pressure once the bleeding has stopped.

## 2017-11-27 NOTE — LETTER
11/27/2017         RE: Mera Valdivia  [address] [address] [phone]        Dear Colleague,    Thank you for referring your patient, Mera Valdivia, to the [facility]. Please see a copy of my visit note below.    Mera Valdivia is a 67 year old year old female patient here today for growth on right cheek and ala.   .  Patient states this has been present for years.  Patient reports the following symptoms:  bleeding.  Patient reports the following previous treatments none.  Patient reports the following modifying factors none.  Associated symptoms: none.  Patient has no other skin complaints today.  Remainder of the HPI, Meds, PMH, Allergies, FH, and SH was reviewed in chart.    Pertinent Hx:   Basal cell carcinoma   Past Medical History:   Diagnosis Date     Endometrial cells on cervical Pap smear inconsistent w/LMP 11/11/15    pap NIL/neg        Past Surgical History:   Procedure Laterality Date     DILATION AND CURETTAGE, OPERATIVE HYSTEROSCOPY, COMBINED N/A 2/16/2016    Procedure: COMBINED DILATION AND CURETTAGE, OPERATIVE HYSTEROSCOPY;  Surgeon: Jenae Walden MD;  Location:  OR     HC FLEX SIGMOIDOSCOPY W/WO DELILAH SPEC BY BRUSH/WASH  1997     HC REVISE MEDIAN N/CARPAL TUNNEL SURG  1985?        Family History   Problem Relation Age of Onset     Breast Cancer Sister      Cancer - colorectal Mother      Cardiovascular Father      QUADRUPLE BYPASS IN 1992       Social History     Social History     Marital status:      Spouse name: N/A     Number of children: N/A     Years of education: N/A     Occupational History     Not on file.     Social History Main Topics     Smoking status: Never Smoker     Smokeless tobacco: Never Used     Alcohol use No     Drug use: No     Sexual activity: No     Other Topics Concern      Service No     Blood Transfusions No     Caffeine Concern No     diet pop, decaff cofee     Occupational Exposure No     Hobby Hazards No      Sleep Concern No     Stress Concern No     Weight Concern No     Special Diet No     Back Care No     Exercise Yes     Seat Belt Yes     Self-Exams No     Parent/Sibling W/ Cabg, Mi Or Angioplasty Before 65f 55m? No     Social History Narrative       Outpatient Encounter Prescriptions as of 11/27/2017   Medication Sig Dispense Refill     lisinopril (PRINIVIL/ZESTRIL) 10 MG tablet TAKE ONE TABLET BY MOUTH EVERY DAY 30 tablet 0     metFORMIN (GLUCOPHAGE-XR) 500 MG 24 hr tablet TAKE THREE TABLETS BY MOUTH EVERY DAY WITH DINNER 90 tablet 0     alendronate (FOSAMAX) 70 MG tablet Take 1 tablet (70 mg) by mouth every 7 days Take with over 8 ounces water and stay upright for at least 30 minutes after dose.  Take at least 60 minutes before breakfast 4 tablet 1     amLODIPine (NORVASC) 5 MG tablet TAKE ONE TABLET BY MOUTH EVERY DAY 90 tablet 0     simvastatin (ZOCOR) 10 MG tablet Take 1 tablet (10 mg) by mouth At Bedtime 30 tablet 11     acetaminophen (TYLENOL) 325 MG tablet Take 2 tablets (650 mg) by mouth every 4 hours as needed for mild pain 100 tablet      aspirin 81 MG EC tablet Take 1 tablet (81 mg) by mouth daily 90 tablet 3     calcium-vitamin D (CALTRATE) 600-400 MG-UNIT per tablet Take 1 tablet by mouth 2 times daily       No facility-administered encounter medications on file as of 11/27/2017.              Review Of Systems  Skin: As above  Eyes: negative  Ears/Nose/Throat: negative  Respiratory: No shortness of breath, dyspnea on exertion, cough, or hemoptysis  Cardiovascular: negative  Gastrointestinal: negative  Genitourinary: negative  Musculoskeletal: negative  Neurologic: negative  Psychiatric: negative  Hematologic/Lymphatic/Immunologic: negative  Endocrine: negative      O:   NAD, WDWN, Alert & Oriented, Mood & Affect wnl, Vitals stable   Here today alone   /77  Pulse 80  SpO2 99%   General appearance normal   Vitals stable   Alert, oriented and in no acute distress      Following lymph nodes  palpated: Occipital, Cervical, Supraclavicular no lad   Stuck on papules and brown macules on trunk and ext    R ala/medial cheek 1.5cm pink pearly papule           The remainder of expanded problem focused exam was unremarkable; the following areas were examined:  scalp/hair, conjunctiva/lids, face, neck, lips, chest, digits/nails, RUE, LUE.      Eyes: Conjunctivae/lids:Normal     ENT: Lips, buccal mucosa, tongue: normal    MSK:Normal    Cardiovascular: peripheral edema none    Pulm: Breathing Normal    Lymph Nodes: No Head and Neck Lymphadenopathy     Neuro/Psych: Orientation:Normal; Mood/Affect:Normal      MICRO:   R ala:Orthokeratosis of epidermis with a proliferation of nests of basaloid cells, with peripheral palisading and a haphazard arrangement in the center extending into the dermis, forming nodules.  The tumor cells have hyperchromatic nuclei. Poor cytoplasm and intercellular bridging.    A/P:  1. R ala/medial cheek r/o basal cell carcinoma   TANGENTIAL BIOPSY IN HOUSE:  After consent, anesthesia with LEC and prep, tangential excision performed and dx above confirmed with frozen section histology.  No complications and routine wound care.  Patient told result basal cell carcinoma scheudle    2. Seborrheic keratosis, lentigo, hx of basal cell carcinoma         BENIGN LESIONS DISCUSSED WITH PATIENT:  I discussed the specifics of tumor, prognosis, and genetics of benign lesions.  I explained that treatment of these lesions would be purely cosmetic and not medically neccessary.  I discussed with patient different removal options including excision, cautery and /or laser.      Nature and genetics of benign skin lesions dicussed with patient.  Signs and Symptoms of skin cancer discussed with patient.  Patient encouraged to perform monthly skin exams.  UV precautions reviewed with patient.  Skin care regimen reviewed with patient: Eliminate harsh soaps, i.e. Dial, zest, irsih spring; Mild soaps such as Cetaphil  or Dove sensitive skin, avoid hot or cold showers, aggressive use of emollients including vanicream, cetaphil or cerave discussed with patient.    Risks of non-melanoma skin cancer discussed with patient   Return to clinic 6 months      Again, thank you for allowing me to participate in the care of your patient.        Sincerely,        Antony Mistry MD

## 2017-11-28 RX ORDER — LISINOPRIL 10 MG/1
TABLET ORAL
Qty: 30 TABLET | Refills: 0 | Status: SHIPPED | OUTPATIENT
Start: 2017-11-28 | End: 2017-12-19

## 2017-11-28 NOTE — TELEPHONE ENCOUNTER
Requested Prescriptions   Pending Prescriptions Disp Refills     lisinopril (PRINIVIL/ZESTRIL) 10 MG tablet [Pharmacy Med Name: LISINOPRIL 10MG TABS] 30 tablet 0     Sig: TAKE ONE TABLET BY MOUTH EVERY DAY    ACE Inhibitors (Including Combos) Protocol Failed    11/27/2017  8:44 AM       Failed - Normal serum creatinine on file in past 12 months    Recent Labs   Lab Test  10/29/16   2102   CR  1.14*            Failed - Normal serum potassium on file in past 12 months    Recent Labs   Lab Test  10/29/16   2102   POTASSIUM  4.3            Passed - Blood pressure under 140/90    BP Readings from Last 3 Encounters:   11/27/17 146/77   11/08/17 132/82   11/07/16 132/68                Passed - Recent or future visit with authorizing provider's specialty    Patient had office visit in the last year or has a visit in the next 30 days with authorizing provider.  See chart review.              Passed - Patient is age 18 or older       Passed - No active pregnancy on record       Passed - No positive pregnancy test in past 12 months

## 2017-11-28 NOTE — TELEPHONE ENCOUNTER
Routing refill request to provider for review/approval because:  Labs out of range:  Creatinine  Labs not current:  Creatinine, potassium    Wanda Galvan RN  [facility]

## 2017-12-04 DIAGNOSIS — E11.65 TYPE 2 DIABETES MELLITUS WITH HYPERGLYCEMIA, WITHOUT LONG-TERM CURRENT USE OF INSULIN (H): ICD-10-CM

## 2017-12-04 NOTE — TELEPHONE ENCOUNTER
metFORMIN (GLUCOPHAGE-XR) 500 MG 24 hr tablet        Last Written Prescription Date: 10/31/17  Last Fill Quantity: 90, # refills: 0  Last Office Visit with G, [facility] or [facility] prescribing provider:  11/8/17       BP Readings from Last 3 Encounters:   11/27/17 146/77   11/08/17 132/82   11/07/16 132/68     Lab Results   Component Value Date    MICROL 21 03/21/2016     Lab Results   Component Value Date    UMALCR 13.99 03/21/2016     Creatinine   Date Value Ref Range Status   10/29/2016 1.14 (H) 0.52 - 1.04 mg/dL Final   ]  GFR Estimate   Date Value Ref Range Status   10/29/2016 47 (L) >60 mL/min/1.7m2 Final     Comment:     Non  GFR Calc   03/21/2016 58 (L) >60 mL/min/1.7m2 Final     Comment:     Non  GFR Calc   11/11/2015 72 >60 mL/min/1.7m2 Final     Comment:     Non  GFR Calc     GFR Estimate If Black   Date Value Ref Range Status   10/29/2016 57 (L) >60 mL/min/1.7m2 Final     Comment:      GFR Calc   03/21/2016 70 >60 mL/min/1.7m2 Final     Comment:      GFR Calc   11/11/2015 87 >60 mL/min/1.7m2 Final     Comment:      GFR Calc     Lab Results   Component Value Date    CHOL 152 04/19/2017     Lab Results   Component Value Date    HDL 43 04/19/2017     Lab Results   Component Value Date    LDL 46 04/19/2017     Lab Results   Component Value Date    TRIG 314 04/19/2017     Lab Results   Component Value Date    CHOLHDLRATIO 4.6 11/11/2015     Lab Results   Component Value Date    AST 19 11/11/2015     Lab Results   Component Value Date    ALT 31 11/11/2015     Lab Results   Component Value Date    A1C 7.1 04/19/2017    A1C 6.8 10/11/2016    A1C 6.7 03/21/2016    A1C 7.2 11/11/2015    A1C 7.9 02/19/2015     Potassium   Date Value Ref Range Status   10/29/2016 4.3 3.4 - 5.3 mmol/L Final

## 2017-12-05 RX ORDER — METFORMIN HCL 500 MG
TABLET, EXTENDED RELEASE 24 HR ORAL
Qty: 90 TABLET | Refills: 1 | Status: SHIPPED | OUTPATIENT
Start: 2017-12-05 | End: 2018-02-06

## 2017-12-05 NOTE — TELEPHONE ENCOUNTER
Routing refill request to provider for review/approval because:  Labs out of range:  BP, Creatinine, GFR  Labs not current:  Microalbumin, A1C    Wanda Galvan RN  [facility]

## 2017-12-05 NOTE — TELEPHONE ENCOUNTER
Requested Prescriptions   Pending Prescriptions Disp Refills     metFORMIN (GLUCOPHAGE-XR) 500 MG 24 hr tablet [Pharmacy Med Name: METFORMIN 500MG ER TABLET] 90 tablet 0     Sig: TAKE THREE TABLETS BY MOUTH EVERY DAY WITH DINNER    Biguanide Agents Failed    12/4/2017  5:02 PM       Failed - Patient's BP is less than 140/90    BP Readings from Last 3 Encounters:   11/27/17 146/77   11/08/17 132/82   11/07/16 132/68                Failed - Patient has had a Microalbumin in the past 12 mos.    Recent Labs   Lab Test  03/21/16   1433   MICROL  21   UMALCR  13.99            Failed - Patient has documented A1c within the specified period of time.    Recent Labs   Lab Test  04/19/17   0837   A1C  7.1*            Passed - Patient has documented LDL within the past 12 mos.    Recent Labs   Lab Test  04/19/17   0837   LDL  46            Passed - Patient is age 10 or older       Passed - Patient's CR is NOT>1.4 OR Patient's EGFR is NOT<45 within past 12 mos.    Recent Labs   Lab Test  10/29/16   2102   GFRESTIMATED  47*   GFRESTBLACK  57*       Recent Labs   Lab Test  10/29/16   2102   CR  1.14*            Passed - Patient does NOT have a diagnosis of CHF.       Passed - Patient is not pregnant       Passed - Patient has not had a positive pregnancy test within the past 12 mos.        Passed - Recent (6 mos) or future visit with authorizing provider's specialty    Patient had office visit in the last 6 months or has a visit in the next 30 days with authorizing provider.  See chart review.

## 2017-12-11 ENCOUNTER — TELEPHONE (OUTPATIENT)
Dept: DERMATOLOGY | Facility: CLINIC | Age: 67
End: 2017-12-11

## 2017-12-11 DIAGNOSIS — M85.89 OSTEOPENIA OF MULTIPLE SITES: ICD-10-CM

## 2017-12-11 NOTE — TELEPHONE ENCOUNTER
Pt called stating that she was scheduled to have MOHS surgery with Dr. Mistry on 12/11/17 @ 8 am and had to cancel early this morning due to road conditions. Pt would like to reschedule MOHS as soon as possible. Please advise.    Melinda Rizo  [facility]

## 2017-12-12 ENCOUNTER — OFFICE VISIT (OUTPATIENT)
Dept: DERMATOLOGY | Facility: CLINIC | Age: 67
End: 2017-12-12
Payer: COMMERCIAL

## 2017-12-12 VITALS — DIASTOLIC BLOOD PRESSURE: 65 MMHG | HEART RATE: 76 BPM | SYSTOLIC BLOOD PRESSURE: 110 MMHG | HEIGHT: 65 IN

## 2017-12-12 DIAGNOSIS — C44.311 BASAL CELL CARCINOMA OF RIGHT ALA NASI: Primary | ICD-10-CM

## 2017-12-12 DIAGNOSIS — C44.319 BASAL CELL CARCINOMA OF RIGHT CHEEK: ICD-10-CM

## 2017-12-12 PROCEDURE — 17311 MOHS 1 STAGE H/N/HF/G: CPT | Performed by: DERMATOLOGY

## 2017-12-12 PROCEDURE — 13132 CMPLX RPR F/C/C/M/N/AX/G/H/F: CPT | Mod: 51 | Performed by: DERMATOLOGY

## 2017-12-12 NOTE — NURSING NOTE
"Initial /65  Pulse 76  Ht 1.638 m (5' 4.5\") Estimated body mass index is 31.07 kg/(m^2) as calculated from the following:    Height as of 11/8/17: 1.626 m (5' 4\").    Weight as of 11/8/17: 82.1 kg (181 lb). .      "

## 2017-12-12 NOTE — LETTER
12/12/2017         RE: Mera Valdivia  [address] [facility] [id number]        Dear Colleague,    Thank you for referring your patient, Mera Valdivia, to the [facility]. Please see a copy of my visit note below.    Mera Valdivia is a 67 year old year old female patient here today for evaluation and managment of basal cell carcinoma on right medial cheek/apical triagnleAssociated symptoms: none.  Patient has no other skin complaints today.  Remainder of the HPI, Meds, PMH, Allergies, FH, and SH was reviewed in chart.      Past Medical History:   Diagnosis Date     Basal cell carcinoma      Endometrial cells on cervical Pap smear inconsistent w/LMP 11/11/15    pap NIL/neg        Past Surgical History:   Procedure Laterality Date     DILATION AND CURETTAGE, OPERATIVE HYSTEROSCOPY, COMBINED N/A 2/16/2016    Procedure: COMBINED DILATION AND CURETTAGE, OPERATIVE HYSTEROSCOPY;  Surgeon: Jenae Walden MD;  Location: [facility]     HC FLEX SIGMOIDOSCOPY W/WO DELILAH SPEC BY BRUSH/WASH  1997     HC REVISE MEDIAN N/CARPAL TUNNEL SURG  1985?        Family History   Problem Relation Age of Onset     Breast Cancer Sister      Cancer - colorectal Mother      Cardiovascular Father      QUADRUPLE BYPASS IN 1992       Social History     Social History     Marital status:      Spouse name: N/A     Number of children: N/A     Years of education: N/A     Occupational History     Not on file.     Social History Main Topics     Smoking status: Never Smoker     Smokeless tobacco: Never Used     Alcohol use No     Drug use: No     Sexual activity: No     Other Topics Concern      Service No     Blood Transfusions No     Caffeine Concern No     diet pop, decaff cofee     Occupational Exposure No     Hobby Hazards No     Sleep Concern No     Stress Concern No     Weight Concern No     Special Diet No     Back Care No     Exercise Yes     Seat Belt Yes     Self-Exams No     Parent/Sibling W/  "Cabg, Mi Or Angioplasty Before 65f 55m? No     Social History Narrative       Outpatient Encounter Prescriptions as of 12/12/2017   Medication Sig Dispense Refill     metFORMIN (GLUCOPHAGE-XR) 500 MG 24 hr tablet TAKE THREE TABLETS BY MOUTH EVERY DAY WITH DINNER 90 tablet 1     lisinopril (PRINIVIL/ZESTRIL) 10 MG tablet TAKE ONE TABLET BY MOUTH EVERY DAY 30 tablet 0     alendronate (FOSAMAX) 70 MG tablet Take 1 tablet (70 mg) by mouth every 7 days Take with over 8 ounces water and stay upright for at least 30 minutes after dose.  Take at least 60 minutes before breakfast 4 tablet 1     amLODIPine (NORVASC) 5 MG tablet TAKE ONE TABLET BY MOUTH EVERY DAY 90 tablet 0     simvastatin (ZOCOR) 10 MG tablet Take 1 tablet (10 mg) by mouth At Bedtime 30 tablet 11     acetaminophen (TYLENOL) 325 MG tablet Take 2 tablets (650 mg) by mouth every 4 hours as needed for mild pain 100 tablet      aspirin 81 MG EC tablet Take 1 tablet (81 mg) by mouth daily 90 tablet 3     calcium-vitamin D (CALTRATE) 600-400 MG-UNIT per tablet Take 1 tablet by mouth 2 times daily       No facility-administered encounter medications on file as of 12/12/2017.              Review Of Systems  Skin: As above  Eyes: negative  Ears/Nose/Throat: negative  Respiratory: No shortness of breath, dyspnea on exertion, cough, or hemoptysis  Cardiovascular: negative  Gastrointestinal: negative  Genitourinary: negative  Musculoskeletal: negative  Neurologic: negative  Psychiatric: negative  Hematologic/Lymphatic/Immunologic: negative  Endocrine: negative      O:   NAD, WDWN, Alert & Oriented, Mood & Affect wnl, Vitals stable   Here today alone   /65  Pulse 76  Ht 1.638 m (5' 4.5\")   General appearance normal   Vitals stable   Alert, oriented and in no acute distress     R medial cheek, apical triangle, ala 1.5cm red scaly papule       Eyes: Conjunctivae/lids:Normal     ENT: Lips, buccal mucosa, tongue: normal    MSK:Normal    Cardiovascular: peripheral " edema none    Pulm: Breathing Normal    Neuro/Psych: Orientation:Normal; Mood/Affect:Normal      A/P:  1. Basal cell carcinoma   MOHS:   Location    After PGACAC discussed with patient, decision for Mohs surgery was made. Indication for Mohs was Location. Patient confirmed the site with Dr. Mistry.  After anesthesia with LEC, the tumor was excised using standard Mohs technique in 1 stages(s).  CLEAR MARGINS OBTAINED and Final defect size was 1.9 cm.     REPAIR COMPLEX: Because of the tightness of the surrounding skin and Because of the size and full thickness nature of the defect, a complex closure was planned. After LEC anesthesia and prep, Burow's triangles were excised in the relaxed skin tension lines. The wound edges were widely undermined by dissection in the subcutaneous plane until adequate tissue mobility was obtained. Hemostasis was obtained. The wound edges were closed in a layered fashion using Vicryl and Fast Absorbing Plain Gut sutures. Postoperative length was 5.9 cm.   EBL minimal; complications none; wound care routine.  The patient was discharged in good condition and will return in one week for wound evaluation.      BENIGN LESIONS DISCUSSED WITH PATIENT:  I discussed the specifics of tumor, prognosis, and genetics of benign lesions.  I explained that treatment of these lesions would be purely cosmetic and not medically neccessary.  I discussed with patient different removal options including excision, cautery and /or laser.      Nature and genetics of benign skin lesions dicussed with patient.  Signs and Symptoms of skin cancer discussed with patient.  Patient encouraged to perform monthly skin exams.  UV precautions reviewed with patient.  Skin care regimen reviewed with patient: Eliminate harsh soaps, i.e. Dial, zest, irsih spring; Mild soaps such as Cetaphil or Dove sensitive skin, avoid hot or cold showers, aggressive use of emollients including vanicream, cetaphil or cerave discussed with  patient.    Risks of non-melanoma skin cancer discussed with patient   Return to clinic 6 months      Again, thank you for allowing me to participate in the care of your patient.        Sincerely,        Antony Mistry MD

## 2017-12-12 NOTE — PROGRESS NOTES
Mera Valdivia is a 67 year old year old female patient here today for evaluation and managment of basal cell carcinoma on right medial cheek/apical triagnleAssociated symptoms: none.  Patient has no other skin complaints today.  Remainder of the HPI, Meds, PMH, Allergies, FH, and SH was reviewed in chart.      Past Medical History:   Diagnosis Date     Basal cell carcinoma      Endometrial cells on cervical Pap smear inconsistent w/LMP 11/11/15    pap NIL/neg        Past Surgical History:   Procedure Laterality Date     DILATION AND CURETTAGE, OPERATIVE HYSTEROSCOPY, COMBINED N/A 2/16/2016    Procedure: COMBINED DILATION AND CURETTAGE, OPERATIVE HYSTEROSCOPY;  Surgeon: Jenae Walden MD;  Location: [facility]     HC FLEX SIGMOIDOSCOPY W/WO DELILAH SPEC BY BRUSH/WASH  1997     HC REVISE MEDIAN N/CARPAL TUNNEL SURG  1985?        Family History   Problem Relation Age of Onset     Breast Cancer Sister      Cancer - colorectal Mother      Cardiovascular Father      QUADRUPLE BYPASS IN 1992       Social History     Social History     Marital status:      Spouse name: N/A     Number of children: N/A     Years of education: N/A     Occupational History     Not on file.     Social History Main Topics     Smoking status: Never Smoker     Smokeless tobacco: Never Used     Alcohol use No     Drug use: No     Sexual activity: No     Other Topics Concern      Service No     Blood Transfusions No     Caffeine Concern No     diet pop, decaff cofee     Occupational Exposure No     Hobby Hazards No     Sleep Concern No     Stress Concern No     Weight Concern No     Special Diet No     Back Care No     Exercise Yes     Seat Belt Yes     Self-Exams No     Parent/Sibling W/ Cabg, Mi Or Angioplasty Before 65f 55m? No     Social History Narrative       Outpatient Encounter Prescriptions as of 12/12/2017   Medication Sig Dispense Refill     metFORMIN (GLUCOPHAGE-XR) 500 MG 24 hr tablet TAKE THREE TABLETS BY MOUTH EVERY  "DAY WITH DINNER 90 tablet 1     lisinopril (PRINIVIL/ZESTRIL) 10 MG tablet TAKE ONE TABLET BY MOUTH EVERY DAY 30 tablet 0     alendronate (FOSAMAX) 70 MG tablet Take 1 tablet (70 mg) by mouth every 7 days Take with over 8 ounces water and stay upright for at least 30 minutes after dose.  Take at least 60 minutes before breakfast 4 tablet 1     amLODIPine (NORVASC) 5 MG tablet TAKE ONE TABLET BY MOUTH EVERY DAY 90 tablet 0     simvastatin (ZOCOR) 10 MG tablet Take 1 tablet (10 mg) by mouth At Bedtime 30 tablet 11     acetaminophen (TYLENOL) 325 MG tablet Take 2 tablets (650 mg) by mouth every 4 hours as needed for mild pain 100 tablet      aspirin 81 MG EC tablet Take 1 tablet (81 mg) by mouth daily 90 tablet 3     calcium-vitamin D (CALTRATE) 600-400 MG-UNIT per tablet Take 1 tablet by mouth 2 times daily       No facility-administered encounter medications on file as of 12/12/2017.              Review Of Systems  Skin: As above  Eyes: negative  Ears/Nose/Throat: negative  Respiratory: No shortness of breath, dyspnea on exertion, cough, or hemoptysis  Cardiovascular: negative  Gastrointestinal: negative  Genitourinary: negative  Musculoskeletal: negative  Neurologic: negative  Psychiatric: negative  Hematologic/Lymphatic/Immunologic: negative  Endocrine: negative      O:   NAD, WDWN, Alert & Oriented, Mood & Affect wnl, Vitals stable   Here today alone   /65  Pulse 76  Ht 1.638 m (5' 4.5\")   General appearance normal   Vitals stable   Alert, oriented and in no acute distress     R medial cheek, apical triangle, ala 1.5cm red scaly papule       Eyes: Conjunctivae/lids:Normal     ENT: Lips, buccal mucosa, tongue: normal    MSK:Normal    Cardiovascular: peripheral edema none    Pulm: Breathing Normal    Neuro/Psych: Orientation:Normal; Mood/Affect:Normal      A/P:  1. Basal cell carcinoma   MOHS:   Location    After PGACAC discussed with patient, decision for Mohs surgery was made. Indication for Mohs was " Location. Patient confirmed the site with Dr. Mistry.  After anesthesia with LEC, the tumor was excised using standard Mohs technique in 1 stages(s).  CLEAR MARGINS OBTAINED and Final defect size was 1.9 cm.     REPAIR COMPLEX: Because of the tightness of the surrounding skin and Because of the size and full thickness nature of the defect, a complex closure was planned. After LEC anesthesia and prep, Burow's triangles were excised in the relaxed skin tension lines. The wound edges were widely undermined by dissection in the subcutaneous plane until adequate tissue mobility was obtained. Hemostasis was obtained. The wound edges were closed in a layered fashion using Vicryl and Fast Absorbing Plain Gut sutures. Postoperative length was 5.9 cm.   EBL minimal; complications none; wound care routine.  The patient was discharged in good condition and will return in one week for wound evaluation.      BENIGN LESIONS DISCUSSED WITH PATIENT:  I discussed the specifics of tumor, prognosis, and genetics of benign lesions.  I explained that treatment of these lesions would be purely cosmetic and not medically neccessary.  I discussed with patient different removal options including excision, cautery and /or laser.      Nature and genetics of benign skin lesions dicussed with patient.  Signs and Symptoms of skin cancer discussed with patient.  Patient encouraged to perform monthly skin exams.  UV precautions reviewed with patient.  Skin care regimen reviewed with patient: Eliminate harsh soaps, i.e. Dial, zest, irsih spring; Mild soaps such as Cetaphil or Dove sensitive skin, avoid hot or cold showers, aggressive use of emollients including vanicream, cetaphil or cerave discussed with patient.    Risks of non-melanoma skin cancer discussed with patient   Return to clinic 6 months

## 2017-12-12 NOTE — MR AVS SNAPSHOT
After Visit Summary   2017    Mera Valdivia    MRN: [id number]           Patient Information     Date Of Birth          1950        Visit Information        Provider Department      2017 7:30 AM Antony Mistry MD [facility]        Today's Diagnoses     Basal cell carcinoma of right ala nasi    -  1    Basal cell carcinoma of right cheek          Care Instructions      Sutured Wound Care     [facility]: [phone]    [facility]: [phone]    Brown tape (where sutures are) top and bottom of incision      ? No strenuous activity for 48 hours. Resume moderate activity in 48 hours. No heavy exercising until you are seen for follow up in one week.     ? Take Tylenol as needed for discomfort.                         ? Do not drink alcoholic beverages for 48 hours.     ? Keep the pressure bandage in place for 24 hours. If the bandage becomes blood tinged or loose, reinforce it with gauze and tape.        (Refer to the reverse side of this page for management of bleeding).    ? Remove pressure bandage in 24 hours     ? Leave the flat bandage in place until your follow up appointment.    ? Keep the bandage dry. Wash around it carefully.    ? If the tape becomes soiled or starts to come off, reinforce it with additional paper tape.    ? Do not smoke for 3 weeks; smoking is detrimental to wound healing.    ? It is normal to have swelling and bruising around the surgical site. The bruising will fade in approximately 10-14 days. Elevate the area to reduce swelling.    ? Numbness, itchiness and sensitivity to temperature changes can occur after surgery and may take up to 18 months to normalize.      POSSIBLE COMPLICATIONS    BLEEDIN. Leave the bandage in place.  2. Use tightly rolled up gauze or a cloth to apply direct pressure over the bandage for 20   minutes.  3. Reapply pressure for an additional 20 minutes if necessary  4. Call the  office or go to the nearest emergency room if pressure fails to stop the bleeding.  5. Use additional gauze and tape to maintain pressure once the bleeding has stopped.        PAIN:    1. Post operative pain should slowly get better, never worse.  2. A severe increase in pain may indicate a problem. Call the office if this occurs.    In case of emergency phone:Dr Mistry [phone]        Open Wound Care     for __Center of incision (the hole)____________        ? No strenuous activity for 48 hours    ? Take Tylenol as needed for discomfort.                                                .         ? Do not drink alcoholic beverages for 48 hours.    ? Keep the pressure bandage in place for 24 hours. If the bandage becomes blood tinged or loose, reinforce it with gauze and tape.        (Refer to the reverse side of this page for management of bleeding).    ? Remove bandage in 24 hours and begin wound care as follows:     1. Clean area with tap water using a Q tip or gauze pad, (shower / bathe normally)  2. Dry wound with Q tip or gauze pad  3. Apply Aquaphor, Vaseline, Polysporin or Bacitracin Ointment with a Q tip    Do NOT use Neosporin Ointment *  4. Cover the wound with a band-aid or nonstick gauze pad and paper tape.  5. Repeat wound care once a day until wound is completely healed.    It is an old wives tale that a wound heals better when it is exposed to air and allowed to dry out. The wound will heal faster with a better cosmetic result if it is kept moist with ointment and covered with a bandage.  Do not let the wound dry out.      Supplies Needed:                Qtips or gauze pads                Polysporin or Bacitracin Ointment                Bandaids or nonstick gauze pads and paper tape    Wound care kits and brown paper tape are available for purchase at   the pharmacy.    BLEEDIN. Use tightly rolled up gauze or cloth to apply direct pressure over the bandage for 20   minutes.  2. Reapply  pressure for an additional 20 minutes if necessary  3. Call the office or go to the nearest emergency room if pressure fails to stop the bleeding.  4. Use additional gauze and tape to maintain pressure once the bleeding has stopped.  5. Begin wound care 24 hours after surgery as directed.                  WOUND HEALING    1. One week after surgery a pink / red halo will form around the outside of the wound.   This is new skin.  2. The center of the wound will appear yellowish white and produce some drainage.  3. The pink halo will slowly migrate in toward the center of the wound until the wound is covered with new shiny pink skin.  4. There will be no more drainage when the wound is completely healed.  5. It will take six months to one year for the redness to fade.  6. The scar may be itchy, tight and sensitive to extreme temperatures for a year after the surgery.  7. Massaging the area several times a day for several minutes after the wound is completely healed will help the scar soften and normalize faster. Begin massage only after healing is complete.      In case of emergency call: Dr Mistry: [phone]     [facility]: [phone]    [facility]: [phone]              Follow-ups after your visit        Follow-up notes from your care team     Return in about 1 week (around 2017) for BANDAGE CHANGE.      Who to contact     If you have questions or need follow up information about today's clinic visit or your schedule please contact [facility] directly at [phone].  Normal or non-critical lab and imaging results will be communicated to you by MyChart, letter or phone within 4 business days after the clinic has received the results. If you do not hear from us within 7 days, please contact the clinic through MyChart or phone. If you have a critical or abnormal lab result, we will notify you by phone as soon as possible.  Submit refill requests through [employer] or call  "your pharmacy and they will forward the refill request to us. Please allow 3 business days for your refill to be completed.          Additional Information About Your Visit        MyChart Information     [employer] lets you send messages to your doctor, view your test results, renew your prescriptions, schedule appointments and more. To sign up, go to [email or web address] . Click on \"Log in\" on the left side of the screen, which will take you to the Welcome page. Then click on \"Sign up Now\" on the right side of the page.     You will be asked to enter the access code listed below, as well as some personal information. Please follow the directions to create your username and password.     Your access code is: [email or web address]  Expires: 2018 11:37 AM     Your access code will  in 90 days. If you need help or a new code, please call your [place] clinic or [phone].        Care EveryWhere ID     This is your [facility] EveryWhere ID. This could be used by other organizations to access your [place] medical records  [id number]        Your Vitals Were     Pulse Height                76 1.638 m (5' 4.5\")           Blood Pressure from Last 3 Encounters:   17 110/65   17 146/77   17 132/82    Weight from Last 3 Encounters:   17 82.1 kg (181 lb)   16 82.1 kg (181 lb)   10/29/16 81.5 kg (179 lb 10.8 oz)              We Performed the Following     MOHS HEAD/NCK/HND/FT/GEN 1ST STAGE UP T0 5 BLOCKS     REPAIR COMPLEX, WOUND HEAD/AXIL/GEN/HND/FT 2.6-7.5 CM        Primary Care Provider Office Phone # Fax #    William Antony Elizondo [phone] [address] [place] [address]        Equal Access to Services     [facility] : Fransisco Levin, wamadiha burk, qaantoni baum . [facility] [phone].    ATENCIÓN: Si habla español, tiene a woods disposición servicios gratuitos de asistencia lingüística. " Aston ramos [phone].    We comply with applicable federal civil rights laws and [place] laws. We do not discriminate on the basis of race, color, national origin, age, disability, sex, sexual orientation, or gender identity.            Thank you!     Thank you for choosing [facility]  for your care. Our goal is always to provide you with excellent care. Hearing back from our patients is one way we can continue to improve our services. Please take a few minutes to complete the written survey that you may receive in the mail after your visit with us. Thank you!             Your Updated Medication List - Protect others around you: Learn how to safely use, store and throw away your medicines at www.disposemymeds.org.          This list is accurate as of: 12/12/17 10:29 AM.  Always use your most recent med list.                   Brand Name Dispense Instructions for use Diagnosis    acetaminophen 325 MG tablet    TYLENOL    100 tablet    Take 2 tablets (650 mg) by mouth every 4 hours as needed for mild pain        alendronate 70 MG tablet    FOSAMAX    4 tablet    Take 1 tablet (70 mg) by mouth every 7 days Take with over 8 ounces water and stay upright for at least 30 minutes after dose.  Take at least 60 minutes before breakfast    Osteopenia of multiple sites       amLODIPine 5 MG tablet    NORVASC    90 tablet    TAKE ONE TABLET BY MOUTH EVERY DAY    Hyperlipidemia LDL goal <100       aspirin 81 MG EC tablet     90 tablet    Take 1 tablet (81 mg) by mouth daily    Essential hypertension       calcium-vitamin D 600-400 MG-UNIT per tablet    CALTRATE     Take 1 tablet by mouth 2 times daily        lisinopril 10 MG tablet    PRINIVIL/ZESTRIL    30 tablet    TAKE ONE TABLET BY MOUTH EVERY DAY    Type 2 diabetes mellitus with hyperglycemia, without long-term current use of insulin (H)       metFORMIN 500 MG 24 hr tablet    GLUCOPHAGE-XR    90 tablet    TAKE THREE TABLETS BY MOUTH EVERY DAY WITH DINNER     Type 2 diabetes mellitus with hyperglycemia, without long-term current use of insulin (H)       simvastatin 10 MG tablet    ZOCOR    30 tablet    Take 1 tablet (10 mg) by mouth At Bedtime    Type 2 diabetes mellitus with hyperglycemia, unspecified long term insulin use status (H), Hyperlipidemia LDL goal <100

## 2017-12-12 NOTE — PATIENT INSTRUCTIONS
Sutured Wound Care     [facility]: [phone]    [facility]: [phone]    Brown tape (where sutures are) top and bottom of incision      ? No strenuous activity for 48 hours. Resume moderate activity in 48 hours. No heavy exercising until you are seen for follow up in one week.     ? Take Tylenol as needed for discomfort.                         ? Do not drink alcoholic beverages for 48 hours.     ? Keep the pressure bandage in place for 24 hours. If the bandage becomes blood tinged or loose, reinforce it with gauze and tape.        (Refer to the reverse side of this page for management of bleeding).    ? Remove pressure bandage in 24 hours     ? Leave the flat bandage in place until your follow up appointment.    ? Keep the bandage dry. Wash around it carefully.    ? If the tape becomes soiled or starts to come off, reinforce it with additional paper tape.    ? Do not smoke for 3 weeks; smoking is detrimental to wound healing.    ? It is normal to have swelling and bruising around the surgical site. The bruising will fade in approximately 10-14 days. Elevate the area to reduce swelling.    ? Numbness, itchiness and sensitivity to temperature changes can occur after surgery and may take up to 18 months to normalize.      POSSIBLE COMPLICATIONS    BLEEDIN. Leave the bandage in place.  2. Use tightly rolled up gauze or a cloth to apply direct pressure over the bandage for 20   minutes.  3. Reapply pressure for an additional 20 minutes if necessary  4. Call the office or go to the nearest emergency room if pressure fails to stop the bleeding.  5. Use additional gauze and tape to maintain pressure once the bleeding has stopped.        PAIN:    1. Post operative pain should slowly get better, never worse.  2. A severe increase in pain may indicate a problem. Call the office if this occurs.    In case of emergency phone:Dr Mistry [phone]        Open Wound Care     for __Center of  incision (the hole)____________        ? No strenuous activity for 48 hours    ? Take Tylenol as needed for discomfort.                                                .         ? Do not drink alcoholic beverages for 48 hours.    ? Keep the pressure bandage in place for 24 hours. If the bandage becomes blood tinged or loose, reinforce it with gauze and tape.        (Refer to the reverse side of this page for management of bleeding).    ? Remove bandage in 24 hours and begin wound care as follows:     1. Clean area with tap water using a Q tip or gauze pad, (shower / bathe normally)  2. Dry wound with Q tip or gauze pad  3. Apply Aquaphor, Vaseline, Polysporin or Bacitracin Ointment with a Q tip    Do NOT use Neosporin Ointment *  4. Cover the wound with a band-aid or nonstick gauze pad and paper tape.  5. Repeat wound care once a day until wound is completely healed.    It is an old wives tale that a wound heals better when it is exposed to air and allowed to dry out. The wound will heal faster with a better cosmetic result if it is kept moist with ointment and covered with a bandage.  Do not let the wound dry out.      Supplies Needed:                Qtips or gauze pads                Polysporin or Bacitracin Ointment                Bandaids or nonstick gauze pads and paper tape    Wound care kits and brown paper tape are available for purchase at   the pharmacy.    BLEEDIN. Use tightly rolled up gauze or cloth to apply direct pressure over the bandage for 20   minutes.  2. Reapply pressure for an additional 20 minutes if necessary  3. Call the office or go to the nearest emergency room if pressure fails to stop the bleeding.  4. Use additional gauze and tape to maintain pressure once the bleeding has stopped.  5. Begin wound care 24 hours after surgery as directed.                  WOUND HEALING    1. One week after surgery a pink / red halo will form around the outside of the wound.   This is new  skin.  2. The center of the wound will appear yellowish white and produce some drainage.  3. The pink halo will slowly migrate in toward the center of the wound until the wound is covered with new shiny pink skin.  4. There will be no more drainage when the wound is completely healed.  5. It will take six months to one year for the redness to fade.  6. The scar may be itchy, tight and sensitive to extreme temperatures for a year after the surgery.  7. Massaging the area several times a day for several minutes after the wound is completely healed will help the scar soften and normalize faster. Begin massage only after healing is complete.      In case of emergency call: Dr Mistry: [phone]     [facility]: [phone]    [facility]: [phone]

## 2017-12-13 RX ORDER — ALENDRONATE SODIUM 70 MG/1
70 TABLET ORAL
Qty: 4 TABLET | Refills: 1 | Status: SHIPPED | OUTPATIENT
Start: 2017-12-13 | End: 2018-02-08

## 2017-12-13 NOTE — TELEPHONE ENCOUNTER
Routing refill request to provider for review/approval because:  Labs out of range:  Creatinine  Labs not current:  DEXA scan    Wanda Galvan RN  [facility]

## 2017-12-13 NOTE — TELEPHONE ENCOUNTER
Requested Prescriptions   Pending Prescriptions Disp Refills     alendronate (FOSAMAX) 70 MG tablet 4 tablet 1     Sig: Take 1 tablet (70 mg) by mouth every 7 days Take with over 8 ounces water and stay upright for at least 30 minutes after dose.  Take at least 60 minutes before breakfast    Bisphosphonates Failed    12/11/2017 12:06 PM       Failed - Dexa on file within past 2 years    Please review last Dexa result.          Failed - Normal Serum Creatinine on file within past 12 months    Recent Labs   Lab Test  10/29/16   2102   CR  1.14*            Passed - Recent or future visit with authorizing provider's specialty    Patient had office visit in the last year or has a visit in the next 30 days with authorizing provider.  See chart review.              Passed - Patient is age 18 or older

## 2017-12-14 ENCOUNTER — TELEPHONE (OUTPATIENT)
Dept: DERMATOLOGY | Facility: CLINIC | Age: 67
End: 2017-12-14

## 2017-12-14 NOTE — TELEPHONE ENCOUNTER
"Spoke to patient who has removed the pressure bandage.     She stated: \"It looks good-it's just some dried blood that doesn't look so good, so I just wanted to know if I should change it or just leave it alone? I am not having any issues and it looks ok other than the dried blood which looks stacey gross..\"     Advised to leave bandage in place as she is to return for bandage change on Tu 12-19-17 and I am afraid if she were to remove bandage that a new bandage may not stick to the skin since she has an open area in the center of the wound (per chart review)  that she is applying bacitracin to daily. I did suggest if she needs to go out and appearance is bothersome, she could put another bandage over the bandage that has dried blood on it, but Dr Mistry usually recommends leaving the bandage in place for 1 week post Mohs surgery.     Patient verbalized understanding. Sharmila Camargo RN    "

## 2017-12-14 NOTE — TELEPHONE ENCOUNTER
Reason for Call:  Other call back    Detailed comments: pt calling stating she had mohs on 12/12, would like to know if she can remove and replace the under bandage, it is bloody    Phone Number Patient can be reached at: Home number on file [phone] (home)    Best Time: any     Can we leave a detailed message on this number? YES    Call taken on 12/14/2017 at 12:52 PM by Allegra Noriega

## 2017-12-19 ENCOUNTER — ALLIED HEALTH/NURSE VISIT (OUTPATIENT)
Dept: DERMATOLOGY | Facility: CLINIC | Age: 67
End: 2017-12-19
Payer: COMMERCIAL

## 2017-12-19 DIAGNOSIS — Z48.02 ATTENTION TO DRESSINGS AND SUTURES: Primary | ICD-10-CM

## 2017-12-19 DIAGNOSIS — E11.65 TYPE 2 DIABETES MELLITUS WITH HYPERGLYCEMIA, WITHOUT LONG-TERM CURRENT USE OF INSULIN (H): ICD-10-CM

## 2017-12-19 DIAGNOSIS — E78.5 HYPERLIPIDEMIA LDL GOAL <100: ICD-10-CM

## 2017-12-19 DIAGNOSIS — Z48.00 ATTENTION TO DRESSINGS AND SUTURES: Primary | ICD-10-CM

## 2017-12-19 PROCEDURE — 99207 ZZC NO CHARGE NURSE ONLY: CPT

## 2017-12-19 NOTE — PATIENT INSTRUCTIONS
WOUND CARE INSTRUCTIONS  for  ONE WEEK AFTER SURGERY          1) Leave flat bandage on your skin for one week after today s bandage change.  2) In one week when you remove the bandage, you may resume your regular skin care routine, including washing with mild soap and water, applying moisturizer, make-up and sunscreen.    3) If there are any open or bleeding areas at the incision/graft site you should begin to cover the area with a bandage daily as follows:    1) Clean and dry the area with plain tap water using a Q-tip or sterile gauze pad.  2) Apply Polysporin or Bacitracin ointment to the open area.  3) Cover the wound with a band-aid or a sterile non-stick gauze pad and micropore paper tape.             *Once the bandages are removed, the scar will be red and firm (especially in the lip/chin area). This is normal and will fade in time. It might take 6-12 months for this to happen.     *Massaging the area will help the scar soften and fade quicker. Begin to massage the area one month after the bandages have been removed. To massage apply pressure directly and firmly over the scar with the fingertips and move in a circular motion. Massage the area for a few minutes several times a day. Continue to massage the site for several months.    *Approximately 6-8 weeks after surgery it is not uncommon to see the formation of  tender pimple-like  bump along the scar. This is normal. As the scar continues to mature and the stitches underneath the skin begin to dissolve, this might occur. Do not pick or squeeze, this will resolve on it s own. Should one break open producing a small amount of drainage, apply Polysporin or Bacitracin ointment a few times a day until the wound is completely healed.    *Numbness in the surgical area is expected. It might take 12-18 months for the feeling to return to normal. During this time sensations of itchiness, tingling and occasional sharp pains might be noted. These feelings are normal  and will subside once the nerves have completely healed.         IN CASE OF EMERGENCY: Dr Msitry [phone]     If you were seen in [place] call: [phone]    If you were seen in [place] call: [phone]

## 2017-12-19 NOTE — PROGRESS NOTES
Pt returned to clinic for post surgery 1 week follow up bandage change. Pt has no complaints, denies pain. Bandage removed from right cheek area cleansed with normal saline. Site is healing and wound edges approximating well. Reapplied new steri strips  On top and bottom of cheek. The middle part was left open and pt is doing daily open wound care to open part.    Advised to watch for signs/sx of infection; spreading redness, drainage, odor, fever. Call or report promptly to clinic. Pt given written instructions and informed to rtc as needed. Patient verbalized understanding.     Ashley Johnson CMA

## 2017-12-19 NOTE — MR AVS SNAPSHOT
After Visit Summary   12/19/2017    Mera Valdivia    MRN: [id number]           Patient Information     Date Of Birth          1950        Visit Information        Provider Department      12/19/2017 1:30 PM [facility]        Care Instructions    WOUND CARE INSTRUCTIONS  for  ONE WEEK AFTER SURGERY          1) Leave flat bandage on your skin for one week after today s bandage change.  2) In one week when you remove the bandage, you may resume your regular skin care routine, including washing with mild soap and water, applying moisturizer, make-up and sunscreen.    3) If there are any open or bleeding areas at the incision/graft site you should begin to cover the area with a bandage daily as follows:    1) Clean and dry the area with plain tap water using a Q-tip or sterile gauze pad.  2) Apply Polysporin or Bacitracin ointment to the open area.  3) Cover the wound with a band-aid or a sterile non-stick gauze pad and micropore paper tape.             *Once the bandages are removed, the scar will be red and firm (especially in the lip/chin area). This is normal and will fade in time. It might take 6-12 months for this to happen.     *Massaging the area will help the scar soften and fade quicker. Begin to massage the area one month after the bandages have been removed. To massage apply pressure directly and firmly over the scar with the fingertips and move in a circular motion. Massage the area for a few minutes several times a day. Continue to massage the site for several months.    *Approximately 6-8 weeks after surgery it is not uncommon to see the formation of  tender pimple-like  bump along the scar. This is normal. As the scar continues to mature and the stitches underneath the skin begin to dissolve, this might occur. Do not pick or squeeze, this will resolve on it s own. Should one break open producing a small amount of drainage, apply Polysporin or Bacitracin  "ointment a few times a day until the wound is completely healed.    *Numbness in the surgical area is expected. It might take 12-18 months for the feeling to return to normal. During this time sensations of itchiness, tingling and occasional sharp pains might be noted. These feelings are normal and will subside once the nerves have completely healed.         IN CASE OF EMERGENCY: Dr Mistry [phone]     If you were seen in [place] call: [phone]    If you were seen in [place] call: [phone]                Follow-ups after your visit        Who to contact     If you have questions or need follow up information about today's clinic visit or your schedule please contact [facility] directly at [phone].  Normal or non-critical lab and imaging results will be communicated to you by MyChart, letter or phone within 4 business days after the clinic has received the results. If you do not hear from us within 7 days, please contact the clinic through MyChart or phone. If you have a critical or abnormal lab result, we will notify you by phone as soon as possible.  Submit refill requests through [facility] or call your pharmacy and they will forward the refill request to us. Please allow 3 business days for your refill to be completed.          Additional Information About Your Visit        [facility] Information     [facility] lets you send messages to your doctor, view your test results, renew your prescriptions, schedule appointments and more. To sign up, go to [email or web address] . Click on \"Log in\" on the left side of the screen, which will take you to the Welcome page. Then click on \"Sign up Now\" on the right side of the page.     You will be asked to enter the access code listed below, as well as some personal information. Please follow the directions to create your username and password.     Your access code is: [email or web address]  Expires: 2018 11:37 AM     Your access code will  in 90 " days. If you need help or a new code, please call your [place] clinic or [phone].        Care EveryWhere ID     This is your Care EveryWhere ID. This could be used by other organizations to access your [place] medical records  [id number]         Blood Pressure from Last 3 Encounters:   12/12/17 110/65   11/27/17 146/77   11/08/17 132/82    Weight from Last 3 Encounters:   11/08/17 82.1 kg (181 lb)   11/07/16 82.1 kg (181 lb)   10/29/16 81.5 kg (179 lb 10.8 oz)              Today, you had the following     No orders found for display       Primary Care Provider Office Phone # Fax #    William Antony Elizondo,  [phone] [address] [place] [address]        Equal Access to Services     [facility] : Hadii aad reese hadasho Soomaali, waaxda luqadaha, qaybta kaalmada adeegyada, antoni quintero hayadeline pickett . So [facility] [phone].    ATENCIÓN: Si habla español, tiene a woods disposición servicios gratuitos de asistencia lingüística. Aston al [phone].    We comply with applicable federal civil rights laws and [place] laws. We do not discriminate on the basis of race, color, national origin, age, disability, sex, sexual orientation, or gender identity.            Thank you!     Thank you for choosing [facility]  for your care. Our goal is always to provide you with excellent care. Hearing back from our patients is one way we can continue to improve our services. Please take a few minutes to complete the written survey that you may receive in the mail after your visit with us. Thank you!             Your Updated Medication List - Protect others around you: Learn how to safely use, store and throw away your medicines at www.disposemymeds.org.          This list is accurate as of: 12/19/17  1:46 PM.  Always use your most recent med list.                   Brand Name Dispense Instructions for use Diagnosis    acetaminophen 325 MG tablet    TYLENOL    100  tablet    Take 2 tablets (650 mg) by mouth every 4 hours as needed for mild pain        alendronate 70 MG tablet    FOSAMAX    4 tablet    Take 1 tablet (70 mg) by mouth every 7 days Take with over 8 ounces water and stay upright for at least 30 minutes after dose.  Take at least 60 minutes before breakfast    Osteopenia of multiple sites       amLODIPine 5 MG tablet    NORVASC    90 tablet    TAKE ONE TABLET BY MOUTH EVERY DAY    Hyperlipidemia LDL goal <100       aspirin 81 MG EC tablet     90 tablet    Take 1 tablet (81 mg) by mouth daily    Essential hypertension       calcium-vitamin D 600-400 MG-UNIT per tablet    CALTRATE     Take 1 tablet by mouth 2 times daily        lisinopril 10 MG tablet    PRINIVIL/ZESTRIL    30 tablet    TAKE ONE TABLET BY MOUTH EVERY DAY    Type 2 diabetes mellitus with hyperglycemia, without long-term current use of insulin (H)       metFORMIN 500 MG 24 hr tablet    GLUCOPHAGE-XR    90 tablet    TAKE THREE TABLETS BY MOUTH EVERY DAY WITH DINNER    Type 2 diabetes mellitus with hyperglycemia, without long-term current use of insulin (H)       simvastatin 10 MG tablet    ZOCOR    30 tablet    Take 1 tablet (10 mg) by mouth At Bedtime    Type 2 diabetes mellitus with hyperglycemia, unspecified long term insulin use status (H), Hyperlipidemia LDL goal <100

## 2017-12-20 NOTE — TELEPHONE ENCOUNTER
Requested Prescriptions   Pending Prescriptions Disp Refills     amLODIPine (NORVASC) 5 MG tablet [Pharmacy Med Name: AMLODIPINE BESYLATE 5MG TABS] 90 tablet 0     Sig: TAKE ONE TABLET BY MOUTH EVERY DAY    Calcium Channel Blockers Protocol  Failed    12/19/2017  7:09 PM       Failed - Normal serum creatinine on file in past 12 months    Recent Labs   Lab Test  10/29/16   2102   CR  1.14*            Passed - Blood pressure under 140/90    BP Readings from Last 3 Encounters:   12/12/17 110/65   11/27/17 146/77   11/08/17 132/82                Passed - Recent or future visit with authorizing provider    Patient had office visit in the last year or has a visit in the next 30 days with authorizing provider.  See chart review.              Passed - Patient is age 18 or older       Passed - No active pregnancy on record       Passed - No positive pregnancy test in past 12 months        lisinopril (PRINIVIL/ZESTRIL) 10 MG tablet [Pharmacy Med Name: LISINOPRIL 10MG TABS] 30 tablet 0     Sig: TAKE ONE TABLET BY MOUTH EVERY DAY    ACE Inhibitors (Including Combos) Protocol Failed    12/19/2017  7:09 PM       Failed - Normal serum creatinine on file in past 12 months    Recent Labs   Lab Test  10/29/16   2102   CR  1.14*            Failed - Normal serum potassium on file in past 12 months    Recent Labs   Lab Test  10/29/16   2102   POTASSIUM  4.3            Passed - Blood pressure under 140/90    BP Readings from Last 3 Encounters:   12/12/17 110/65   11/27/17 146/77   11/08/17 132/82                Passed - Recent or future visit with authorizing provider's specialty    Patient had office visit in the last year or has a visit in the next 30 days with authorizing provider.  See chart review.              Passed - Patient is age 18 or older       Passed - No active pregnancy on record       Passed - No positive pregnancy test in past 12 months

## 2017-12-21 RX ORDER — LISINOPRIL 10 MG/1
TABLET ORAL
Qty: 90 TABLET | Refills: 0 | Status: SHIPPED | OUTPATIENT
Start: 2017-12-21 | End: 2018-03-24

## 2017-12-21 RX ORDER — AMLODIPINE BESYLATE 5 MG/1
TABLET ORAL
Qty: 90 TABLET | Refills: 0 | Status: SHIPPED | OUTPATIENT
Start: 2017-12-21 | End: 2018-03-04

## 2017-12-21 NOTE — TELEPHONE ENCOUNTER
Routing refill request to provider for review/approval because:  Labs out of range:  Creatinine  Labs not current:  Creatinine, potassium    Wanda Galvan RN  [facility]

## 2018-01-02 NOTE — NURSING NOTE
"Initial /77  Pulse 80  SpO2 99% Estimated body mass index is 31.07 kg/(m^2) as calculated from the following:    Height as of 11/8/17: 1.626 m (5' 4\").    Weight as of 11/8/17: 82.1 kg (181 lb). .      " Anxiety    CAD (coronary artery disease)    COPD (chronic obstructive pulmonary disease)    Insomnia    Osteopenia

## 2018-02-06 DIAGNOSIS — E11.65 TYPE 2 DIABETES MELLITUS WITH HYPERGLYCEMIA, WITHOUT LONG-TERM CURRENT USE OF INSULIN (H): ICD-10-CM

## 2018-02-06 NOTE — TELEPHONE ENCOUNTER
"Requested Prescriptions   Pending Prescriptions Disp Refills     metFORMIN (GLUCOPHAGE-XR) 500 MG 24 hr tablet [Pharmacy Med Name: METFORMIN 500MG ER TABLET] 90 tablet 1      Last Written Prescription Date:  12/5/2017  Last Fill Quantity: 90,  # refills: 1   Last Office Visit with [facility] provider:  11-8-2017   Future Office Visit:      Sig: TAKE THREE TABLETS BY MOUTH EVERY DAY WITH DINNER    Biguanide Agents Failed    2/6/2018 10:15 AM       Failed - Patient has had a Microalbumin in the past 12 mos.    Recent Labs   Lab Test  03/21/16   1433   MICROL  21   UMALCR  13.99            Failed - Patient has documented A1c within the specified period of time.    Recent Labs   Lab Test  04/19/17   0837   A1C  7.1*            Passed - Patient's BP is less than 140/90    BP Readings from Last 3 Encounters:   12/12/17 110/65   11/27/17 146/77   11/08/17 132/82                Passed - Patient has documented LDL within the past 12 mos.    Recent Labs   Lab Test  04/19/17   0837   LDL  46            Passed - Patient is age 10 or older       Passed - Patient's CR is NOT>1.4 OR Patient's EGFR is NOT<45 within past 12 mos.    Recent Labs   Lab Test  10/29/16   2102   GFRESTIMATED  47*   GFRESTBLACK  57*       Recent Labs   Lab Test  10/29/16   2102   CR  1.14*            Passed - Patient does NOT have a diagnosis of CHF.       Passed - Patient is not pregnant       Passed - Patient has not had a positive pregnancy test within the past 12 mos.        Passed - Recent (6 mos) or future visit with authorizing provider's specialty    Patient had office visit in the last 6 months or has a visit in the next 30 days with authorizing provider.  See \"Patient Info\" tab in inbasket, or \"Choose Columns\" in Meds & Orders section of the refill encounter.              "

## 2018-02-07 RX ORDER — METFORMIN HCL 500 MG
TABLET, EXTENDED RELEASE 24 HR ORAL
Qty: 90 TABLET | Refills: 0 | Status: SHIPPED | OUTPATIENT
Start: 2018-02-07 | End: 2018-03-04

## 2018-02-07 NOTE — TELEPHONE ENCOUNTER
Metformin  Routing refill request to provider for review/approval because:  Labs out of range:  A1C, GFR, Creatinine  Labs not current:  Microalbumin, A1C, GFR/Creatinine--future labs ordered, routing to schedulers also    Allan Lockett RN, BSN

## 2018-02-08 DIAGNOSIS — M85.89 OSTEOPENIA OF MULTIPLE SITES: ICD-10-CM

## 2018-02-08 NOTE — TELEPHONE ENCOUNTER
"Requested Prescriptions   Pending Prescriptions Disp Refills     alendronate (FOSAMAX) 70 MG tablet 4 tablet 1    Last Written Prescription Date:  12/13/2017  Last Fill Quantity: 4,  # refills: 1  Last Office Visit with [facility] provider:  11-8-2017   Future Office Visit:      Sig: Take 1 tablet (70 mg) by mouth every 7 days Take with over 8 ounces water and stay upright for at least 30 minutes after dose.  Take at least 60 minutes before breakfast    Bisphosphonates Failed    2/8/2018  4:08 PM       Failed - Dexa on file within past 2 years    Please review last Dexa result.          Failed - Normal Serum Creatinine on file within past 12 months    Recent Labs   Lab Test  10/29/16   2102   CR  1.14*            Passed - Recent or future visit with authorizing provider's specialty    Patient had office visit in the last year or has a visit in the next 30 days with authorizing provider.  See \"Patient Info\" tab in inbasket, or \"Choose Columns\" in Meds & Orders section of the refill encounter.            Passed - Patient is age 18 or older          "

## 2018-02-09 RX ORDER — ALENDRONATE SODIUM 70 MG/1
70 TABLET ORAL
Qty: 4 TABLET | Refills: 1 | Status: SHIPPED | OUTPATIENT
Start: 2018-02-09 | End: 2018-04-05

## 2018-02-09 NOTE — TELEPHONE ENCOUNTER
Routing refill request to provider for review/approval because:  Patient needs to be seen because it has been more than 1 year since last office visit and no current DEXA on file (last is from 2015). Pt has labs scheduled for 2/13    Anamaria Pinto RN

## 2018-02-13 DIAGNOSIS — E11.65 TYPE 2 DIABETES MELLITUS WITH HYPERGLYCEMIA, WITHOUT LONG-TERM CURRENT USE OF INSULIN (H): ICD-10-CM

## 2018-02-13 LAB
CREAT SERPL-MCNC: 1.08 MG/DL (ref 0.52–1.04)
CREAT UR-MCNC: 323 MG/DL
GFR SERPL CREATININE-BSD FRML MDRD: 50 ML/MIN/1.7M2
HBA1C MFR BLD: 7.9 % (ref 4.3–6)
MICROALBUMIN UR-MCNC: <20 MG/L
MICROALBUMIN/CREAT UR: 6.19 MG/G CR (ref 0–25)

## 2018-02-13 PROCEDURE — 82565 ASSAY OF CREATININE: CPT | Performed by: INTERNAL MEDICINE

## 2018-02-13 PROCEDURE — 36415 COLL VENOUS BLD VENIPUNCTURE: CPT | Performed by: INTERNAL MEDICINE

## 2018-02-13 PROCEDURE — 82043 UR ALBUMIN QUANTITATIVE: CPT | Performed by: INTERNAL MEDICINE

## 2018-02-13 PROCEDURE — 83036 HEMOGLOBIN GLYCOSYLATED A1C: CPT | Mod: QW | Performed by: INTERNAL MEDICINE

## 2018-02-14 ENCOUNTER — TELEPHONE (OUTPATIENT)
Dept: FAMILY MEDICINE | Facility: OTHER | Age: 68
End: 2018-02-14

## 2018-02-14 NOTE — TELEPHONE ENCOUNTER
----- Message from William Elizondo DO sent at 2/13/2018  9:04 PM CST -----  Please contact the patient and notify her of the following:  The microalbumin is normal.  Kidney function is normal.  The hemoglobin A1c has increased slightly from 7.1 up to 7.9.  This was suggest a mild worsening of blood sugar control.    Thank you.   DO MICHAEL EliasOI

## 2018-02-14 NOTE — TELEPHONE ENCOUNTER
Mera returns call and message is relayed.  Mera states understanding and had no other questions or concerns.

## 2018-02-14 NOTE — PROGRESS NOTES
Please contact the patient and notify her of the following:  The microalbumin is normal.  Kidney function is normal.  The hemoglobin A1c has increased slightly from 7.1 up to 7.9.  This was suggest a mild worsening of blood sugar control.    Thank you.   DO ROLO Elias

## 2018-03-04 ENCOUNTER — TELEPHONE (OUTPATIENT)
Dept: INTERNAL MEDICINE | Facility: CLINIC | Age: 68
End: 2018-03-04

## 2018-03-04 DIAGNOSIS — E11.65 TYPE 2 DIABETES MELLITUS WITH HYPERGLYCEMIA, UNSPECIFIED LONG TERM INSULIN USE STATUS: ICD-10-CM

## 2018-03-04 DIAGNOSIS — E78.5 HYPERLIPIDEMIA LDL GOAL <100: ICD-10-CM

## 2018-03-04 DIAGNOSIS — E11.65 TYPE 2 DIABETES MELLITUS WITH HYPERGLYCEMIA, WITHOUT LONG-TERM CURRENT USE OF INSULIN (H): ICD-10-CM

## 2018-03-05 NOTE — TELEPHONE ENCOUNTER
"Requested Prescriptions   Pending Prescriptions Disp Refills     metFORMIN (GLUCOPHAGE-XR) 500 MG 24 hr tablet [Pharmacy Med Name: METFORMIN 500MG ER TABLET] 90 tablet 0     Sig: TAKE 3 TABLETS ( 1500 MG) BY MOUTH EVERY DAY WITH DINNER    Biguanide Agents Passed    3/4/2018 12:00 PM       Passed - Blood pressure less than 140/90 in past 6 months    BP Readings from Last 3 Encounters:   12/12/17 110/65   11/27/17 146/77   11/08/17 132/82                Passed - Patient has documented LDL within the past 12 mos.    Recent Labs   Lab Test  04/19/17   0837   LDL  46            Passed - Patient has had a Microalbumin in the past 12 mos.    Recent Labs   Lab Test  02/13/18   1306   MICROL  <20   UMALCR  6.19            Passed - Patient is age 10 or older       Passed - Patient has documented A1c within the specified period of time.    Recent Labs   Lab Test  02/13/18   1255   A1C  7.9*            Passed - Patient's CR is NOT>1.4 OR Patient's EGFR is NOT<45 within past 12 mos.    Recent Labs   Lab Test  02/13/18   1255   GFRESTIMATED  50*   GFRESTBLACK  61       Recent Labs   Lab Test  02/13/18   1255   CR  1.08*            Passed - Patient does NOT have a diagnosis of CHF.       Passed - Patient is not pregnant       Passed - Patient has not had a positive pregnancy test within the past 12 mos.        Passed - Recent (6 mo) or future (30 days) visit within the authorizing provider's specialty    Patient had office visit in the last 6 months or has a visit in the next 30 days with authorizing provider.  See \"Patient Info\" tab in inbasket, or \"Choose Columns\" in Meds & Orders section of the refill encounter.            amLODIPine (NORVASC) 5 MG tablet [Pharmacy Med Name: AMLODIPINE BESYLATE 5MG TABS] 90 tablet 0     Sig: TAKE ONE TABLET BY MOUTH EVERY DAY    Calcium Channel Blockers Protocol  Failed    3/4/2018 12:00 PM       Failed - Normal serum creatinine on file in past 12 months    Recent Labs   Lab Test  02/13/18   " "1255   CR  1.08*            Passed - Blood pressure under 140/90 in past 12 months    BP Readings from Last 3 Encounters:   12/12/17 110/65   11/27/17 146/77   11/08/17 132/82                Passed - Recent (12 mo) or future (30 days) visit within the authorizing provider's specialty    Patient had office visit in the last year or has a visit in the next 30 days with authorizing provider.  See \"Patient Info\" tab in inbasket, or \"Choose Columns\" in Meds & Orders section of the refill encounter.            Passed - Patient is age 18 or older       Passed - No active pregnancy on record       Passed - No positive pregnancy test in past 12 months          "

## 2018-03-07 RX ORDER — AMLODIPINE BESYLATE 5 MG/1
TABLET ORAL
Qty: 90 TABLET | Refills: 3 | Status: SHIPPED | OUTPATIENT
Start: 2018-03-07 | End: 2019-03-15

## 2018-03-07 RX ORDER — METFORMIN HCL 500 MG
TABLET, EXTENDED RELEASE 24 HR ORAL
Qty: 90 TABLET | Refills: 5 | Status: SHIPPED | OUTPATIENT
Start: 2018-03-07 | End: 2018-08-31

## 2018-03-08 RX ORDER — SIMVASTATIN 10 MG
10 TABLET ORAL AT BEDTIME
Qty: 30 TABLET | Refills: 7 | Status: SHIPPED | OUTPATIENT
Start: 2018-03-08 | End: 2019-02-12

## 2018-03-08 NOTE — TELEPHONE ENCOUNTER
Pt is calling in regards to this refill. Informed they were called in. She is needing the Simvastatin refilled as well.  Thank you,  Libertad Bennett- Pt Rep.

## 2018-03-08 NOTE — TELEPHONE ENCOUNTER
"Requested Prescriptions   Pending Prescriptions Disp Refills     simvastatin (ZOCOR) 10 MG tablet 30 tablet 11     Sig: Take 1 tablet (10 mg) by mouth At Bedtime    There is no refill protocol information for this order      Signed Prescriptions Disp Refills     metFORMIN (GLUCOPHAGE-XR) 500 MG 24 hr tablet 90 tablet 5     Sig: TAKE 3 TABLETS ( 1500 MG) BY MOUTH EVERY DAY WITH DINNER    Biguanide Agents Passed    3/5/2018 12:24 PM       Passed - Blood pressure less than 140/90 in past 6 months    BP Readings from Last 3 Encounters:   12/12/17 110/65   11/27/17 146/77   11/08/17 132/82                Passed - Patient has documented LDL within the past 12 mos.    Recent Labs   Lab Test  04/19/17   0837   LDL  46            Passed - Patient has had a Microalbumin in the past 12 mos.    Recent Labs   Lab Test  02/13/18   1306   MICROL  <20   UMALCR  6.19            Passed - Patient is age 10 or older       Passed - Patient has documented A1c within the specified period of time.    Recent Labs   Lab Test  02/13/18   1255   A1C  7.9*            Passed - Patient's CR is NOT>1.4 OR Patient's EGFR is NOT<45 within past 12 mos.    Recent Labs   Lab Test  02/13/18   1255   GFRESTIMATED  50*   GFRESTBLACK  61       Recent Labs   Lab Test  02/13/18   1255   CR  1.08*            Passed - Patient does NOT have a diagnosis of CHF.       Passed - Patient is not pregnant       Passed - Patient has not had a positive pregnancy test within the past 12 mos.        Passed - Recent (6 mo) or future (30 days) visit within the authorizing provider's specialty    Patient had office visit in the last 6 months or has a visit in the next 30 days with authorizing provider.  See \"Patient Info\" tab in inbasket, or \"Choose Columns\" in Meds & Orders section of the refill encounter.            amLODIPine (NORVASC) 5 MG tablet 90 tablet 3     Sig: TAKE ONE TABLET BY MOUTH EVERY DAY    Calcium Channel Blockers Protocol  Failed    3/5/2018 12:24 PM    " "   Failed - Normal serum creatinine on file in past 12 months    Recent Labs   Lab Test  02/13/18   1255   CR  1.08*            Passed - Blood pressure under 140/90 in past 12 months    BP Readings from Last 3 Encounters:   12/12/17 110/65   11/27/17 146/77   11/08/17 132/82                Passed - Recent (12 mo) or future (30 days) visit within the authorizing provider's specialty    Patient had office visit in the last year or has a visit in the next 30 days with authorizing provider.  See \"Patient Info\" tab in inbasket, or \"Choose Columns\" in Meds & Orders section of the refill encounter.            Passed - Patient is age 18 or older       Passed - No active pregnancy on record       Passed - No positive pregnancy test in past 12 months          Last Written Prescription Date:  4/19/17  Last Fill Quantity: 30,  # refills: 11   Last Office Visit with [facility], [facility] or [facility] prescribing provider:  **11/8/17*   Future Office Visit:       "

## 2018-03-24 DIAGNOSIS — E11.65 TYPE 2 DIABETES MELLITUS WITH HYPERGLYCEMIA, WITHOUT LONG-TERM CURRENT USE OF INSULIN (H): ICD-10-CM

## 2018-03-26 NOTE — TELEPHONE ENCOUNTER
"Last Written Prescription Date:  12/21/2017  Last Fill Quantity: 90,  # refills: 0   Last office visit: 11/08/2017 with prescribing provider:  Dr. Elizondo   Future Office Visit:    Requested Prescriptions   Pending Prescriptions Disp Refills     lisinopril (PRINIVIL/ZESTRIL) 10 MG tablet [Pharmacy Med Name: LISINOPRIL 10MG TABS] 90 tablet 0     Sig: TAKE ONE TABLET BY MOUTH EVERY DAY    ACE Inhibitors (Including Combos) Protocol Failed    3/24/2018  1:21 PM       Failed - Normal serum creatinine on file in past 12 months    Recent Labs   Lab Test  02/13/18   1255   CR  1.08*            Failed - Normal serum potassium on file in past 12 months    Recent Labs   Lab Test  10/29/16   2102   POTASSIUM  4.3            Passed - Blood pressure under 140/90 in past 12 months    BP Readings from Last 3 Encounters:   12/12/17 110/65   11/27/17 146/77   11/08/17 132/82                Passed - Recent (12 mo) or future (30 days) visit within the authorizing provider's specialty    Patient had office visit in the last 12 months or has a visit in the next 30 days with authorizing provider or within the authorizing provider's specialty.  See \"Patient Info\" tab in inbasket, or \"Choose Columns\" in Meds & Orders section of the refill encounter.           Passed - Patient is age 18 or older       Passed - No active pregnancy on record       Passed - No positive pregnancy test in past 12 months          "

## 2018-03-28 RX ORDER — LISINOPRIL 10 MG/1
TABLET ORAL
Qty: 90 TABLET | Refills: 0 | Status: SHIPPED | OUTPATIENT
Start: 2018-03-28 | End: 2018-06-25

## 2018-03-28 NOTE — TELEPHONE ENCOUNTER
Routing refill request to provider for review/approval because:  Labs out of range:  Cr  Labs not current:  K+  Acacia Coronado RN, BSN

## 2018-03-28 NOTE — TELEPHONE ENCOUNTER
Checking on refill status for Lisinopril. Please let pharmacy know if approved / denied. Thanks so much    Allan Dennis  [facility]  On Behalf of [facility]

## 2018-04-05 DIAGNOSIS — M85.89 OSTEOPENIA OF MULTIPLE SITES: ICD-10-CM

## 2018-04-06 RX ORDER — ALENDRONATE SODIUM 70 MG/1
70 TABLET ORAL
Qty: 4 TABLET | Refills: 1 | Status: SHIPPED | OUTPATIENT
Start: 2018-04-06 | End: 2018-06-01

## 2018-05-30 ENCOUNTER — TELEPHONE (OUTPATIENT)
Dept: FAMILY MEDICINE | Facility: OTHER | Age: 68
End: 2018-05-30

## 2018-05-30 NOTE — TELEPHONE ENCOUNTER
"Reason for call:  Patient reporting a symptom    Symptom or request: Fell last week and injured her rt leg    Duration (how long have symptoms been present): Last Monday 05/21    Have you been treated for this before? No    Additional comments: Mera states she is still \"gimping\" on her leg and is wondering if she should be seen or if she should go to Emergency room.    Phone Number patient can be reached at:  Home number on file [phone] (home)    Best Time:  any    Can we leave a detailed message on this number:  YES    Call taken on 5/30/2018 at 4:10 PM by Anamaria Devries    "

## 2018-05-30 NOTE — TELEPHONE ENCOUNTER
Mera Valdivia is a 67 year old female who calls with concerns of persistent right thigh pain.    NURSING ASSESSMENT:  Description:  The patient reports that last week on Monday (9 days ago), she was walking and missed the edge of the side walk and fell on her right side/leg. (did not hit her head) She reports that she scuffed her knee slightly and and landed on her right thigh.  She reports that she has had a dull ache in her right thigh ever since and has had decreased ROM in the leg. She has no pain with sitting or lying but reports moderate pain with ambulation as well as stiffness in the morning when getting out of bed.  She denies any bruising or swelling. Notes that this pain is not in her hip but more so the thigh area. Per her chart, her last DEXA in 2015 showed osteopenia with increased risk of fracture.  She has had no other symptoms or alterations from her baseline. She has been using topical lidocaine and 400 - 600 mg ibuprofen Q4-5 hours while awake since the injury.        Last exam/Treatment:  11/8/17 with Dr. Elizondo  Allergies:   Allergies   Allergen Reactions     Nitrous Oxide      Doesn't recall any reaction to this drug         NURSING PLAN: Routed to provider Yes and Nursing advice to patient .    RECOMMENDED DISPOSITION:  Patient requested that this message be routed to Dr. Elizondo to see if he could work her in as she would like X-Rays. She was comfortable with seeing another provider or presenting to ER if Dr. Elizondo felt this was necessary as well.  Also, patient taking 400-600 mg Q 4-5 hours while awake, she has stage 3 CKD - Do you have a different recommendation for pain control or is this okay, as is short term?    Home care advice given: continue to monitor. Educated the patient about the signs/symptoms that would warrant seeking emergent care. The patient verbalized understanding.     Reviewed the home care instructions that are found on page 233 of the triage protocol book.      Will comply with recommendation: Yes  If further questions/concerns or if symptoms do not improve, worsen or new symptoms develop, call your PCP or [facility] as soon as possible.      Guideline used:  Telephone Triage Protocols for Nurses, Fifth Edition, Sherie Josue RN

## 2018-05-31 ENCOUNTER — HOSPITAL ENCOUNTER (OUTPATIENT)
Dept: GENERAL RADIOLOGY | Facility: CLINIC | Age: 68
Discharge: HOME | End: 2018-05-31
Attending: INTERNAL MEDICINE | Admitting: INTERNAL MEDICINE
Payer: MEDICARE

## 2018-05-31 ENCOUNTER — OFFICE VISIT (OUTPATIENT)
Dept: INTERNAL MEDICINE | Facility: CLINIC | Age: 68
End: 2018-05-31
Payer: COMMERCIAL

## 2018-05-31 VITALS
TEMPERATURE: 97.1 F | OXYGEN SATURATION: 98 % | DIASTOLIC BLOOD PRESSURE: 62 MMHG | SYSTOLIC BLOOD PRESSURE: 102 MMHG | BODY MASS INDEX: 30.72 KG/M2 | HEART RATE: 92 BPM | WEIGHT: 181.8 LBS

## 2018-05-31 DIAGNOSIS — E78.5 HYPERLIPIDEMIA LDL GOAL <100: ICD-10-CM

## 2018-05-31 DIAGNOSIS — Z12.11 SPECIAL SCREENING FOR MALIGNANT NEOPLASMS, COLON: ICD-10-CM

## 2018-05-31 DIAGNOSIS — E11.65 TYPE 2 DIABETES MELLITUS WITH HYPERGLYCEMIA, WITHOUT LONG-TERM CURRENT USE OF INSULIN (H): ICD-10-CM

## 2018-05-31 DIAGNOSIS — M25.551 HIP PAIN, RIGHT: ICD-10-CM

## 2018-05-31 DIAGNOSIS — I10 BENIGN ESSENTIAL HYPERTENSION: ICD-10-CM

## 2018-05-31 DIAGNOSIS — Z12.31 ENCOUNTER FOR SCREENING MAMMOGRAM FOR BREAST CANCER: Primary | ICD-10-CM

## 2018-05-31 LAB
CHOLEST SERPL-MCNC: 143 MG/DL
HDLC SERPL-MCNC: 43 MG/DL
LDLC SERPL CALC-MCNC: 55 MG/DL
NONHDLC SERPL-MCNC: 100 MG/DL
TRIGL SERPL-MCNC: 227 MG/DL
TSH SERPL DL<=0.005 MIU/L-ACNC: 1.52 MU/L (ref 0.4–4)

## 2018-05-31 PROCEDURE — 72170 X-RAY EXAM OF PELVIS: CPT | Mod: TC

## 2018-05-31 PROCEDURE — 36415 COLL VENOUS BLD VENIPUNCTURE: CPT | Performed by: INTERNAL MEDICINE

## 2018-05-31 PROCEDURE — 99214 OFFICE O/P EST MOD 30 MIN: CPT | Performed by: INTERNAL MEDICINE

## 2018-05-31 PROCEDURE — 99207 C FOOT EXAM  NO CHARGE: CPT | Performed by: INTERNAL MEDICINE

## 2018-05-31 PROCEDURE — 84443 ASSAY THYROID STIM HORMONE: CPT | Performed by: INTERNAL MEDICINE

## 2018-05-31 PROCEDURE — 80061 LIPID PANEL: CPT | Performed by: INTERNAL MEDICINE

## 2018-05-31 ASSESSMENT — PAIN SCALES - GENERAL: PAINLEVEL: MODERATE PAIN (4)

## 2018-05-31 NOTE — MR AVS SNAPSHOT
After Visit Summary   5/31/2018    Mera Valdivia    MRN: [id number]           Patient Information     Date Of Birth          1950        Visit Information        Provider Department      5/31/2018 8:20 AM William Elizondo DO [facility]        Today's Diagnoses     Encounter for screening mammogram for breast cancer    -  1    Hip pain, right        Hyperlipidemia LDL goal <100        Type 2 diabetes mellitus with hyperglycemia, without long-term current use of insulin (H)        Special screening for malignant neoplasms, colon        Benign essential hypertension           Follow-ups after your visit        Follow-up notes from your care team     Return in about 1 month (around 6/30/2018).      Future tests that were ordered for you today     Open Future Orders        Priority Expected Expires Ordered    XR Pelvis 1/2 Views Routine 5/31/2018 5/31/2019 5/31/2018    *MA Screening Digital Bilateral Routine  5/31/2019 5/31/2018    Fecal colorectal cancer screen (FIT) Routine 6/21/2018 8/23/2018 5/31/2018            Who to contact     If you have questions or need follow up information about today's clinic visit or your schedule please contact [facility] directly at [phone].  Normal or non-critical lab and imaging results will be communicated to you by MyChart, letter or phone within 4 business days after the clinic has received the results. If you do not hear from us within 7 days, please contact the clinic through MyChart or phone. If you have a critical or abnormal lab result, we will notify you by phone as soon as possible.  Submit refill requests through [facility] [employer] or call your pharmacy and they will forward the refill request to us. Please allow 3 business days for your refill to be completed.          Additional Information About Your Visit        Care EveryWhere ID     This is your Care EveryWhere ID. This could be used by other organizations to  access your [facility] medical records  [id number]        Your Vitals Were     Pulse Temperature Pulse Oximetry Breastfeeding? BMI (Body Mass Index)       92 97.1  F (36.2  C) (Temporal) 98% No 30.72 kg/m2        Blood Pressure from Last 3 Encounters:   05/31/18 102/62   12/12/17 110/65   11/27/17 146/77    Weight from Last 3 Encounters:   05/31/18 181 lb 12.8 oz (82.5 kg)   11/08/17 181 lb (82.1 kg)   11/07/16 181 lb (82.1 kg)              We Performed the Following     FOOT EXAM     Lipid panel reflex to direct LDL Fasting     TSH with free T4 reflex        Primary Care Provider Office Phone # Fax #    William Caballerojuli,  [phone]       [address] [place] [address]        Equal Access to Services     KIMBERLY PABLO : Hadii champ leigh hadasho Soomaali, waaxda luqadaha, qaybta kaalmada adeegyada, antoni pickett . So [facility] [phone].    ATENCIÓN: Si habla español, tiene a woods disposición servicios gratuitos de asistencia lingüística. Llame al [phone].    We comply with applicable federal civil rights laws and [place] laws. We do not discriminate on the basis of race, color, national origin, age, disability, sex, sexual orientation, or gender identity.            Thank you!     Thank you for choosing [facility]  for your care. Our goal is always to provide you with excellent care. Hearing back from our patients is one way we can continue to improve our services. Please take a few minutes to complete the written survey that you may receive in the mail after your visit with us. Thank you!             Your Updated Medication List - Protect others around you: Learn how to safely use, store and throw away your medicines at www.disposemymeds.org.          This list is accurate as of 5/31/18  8:56 AM.  Always use your most recent med list.                   Brand Name Dispense Instructions for use Diagnosis    acetaminophen 325 MG tablet     TYLENOL    100 tablet    Take 2 tablets (650 mg) by mouth every 4 hours as needed for mild pain        alendronate 70 MG tablet    FOSAMAX    4 tablet    Take 1 tablet (70 mg) by mouth every 7 days Take with over 8 ounces water and stay upright for at least 30 minutes after dose.  Take at least 60 minutes before breakfast    Osteopenia of multiple sites       amLODIPine 5 MG tablet    NORVASC    90 tablet    TAKE ONE TABLET BY MOUTH EVERY DAY    Hyperlipidemia LDL goal <100       aspirin 81 MG EC tablet     90 tablet    Take 1 tablet (81 mg) by mouth daily    Essential hypertension       calcium-vitamin D 600-400 MG-UNIT per tablet    CALTRATE     Take 1 tablet by mouth 2 times daily        lisinopril 10 MG tablet    PRINIVIL/ZESTRIL    90 tablet    TAKE ONE TABLET BY MOUTH EVERY DAY    Type 2 diabetes mellitus with hyperglycemia, without long-term current use of insulin (H)       metFORMIN 500 MG 24 hr tablet    GLUCOPHAGE-XR    90 tablet    TAKE 3 TABLETS ( 1500 MG) BY MOUTH EVERY DAY WITH DINNER    Type 2 diabetes mellitus with hyperglycemia, without long-term current use of insulin (H)       simvastatin 10 MG tablet    ZOCOR    30 tablet    Take 1 tablet (10 mg) by mouth At Bedtime    Type 2 diabetes mellitus with hyperglycemia, unspecified long term insulin use status (H), Hyperlipidemia LDL goal <100

## 2018-05-31 NOTE — PROGRESS NOTES
SUBJECTIVE:   Mera Valdivia is a 67 year old female who presents to clinic today for the following health issues:  Health Maintenance reviewed at today's visit patient asked to schedule/complete:   Breast Cancer:  Patient agrees to schedule  Colon Cancer:  Patient agrees to schedule    Chief Complaint   Patient presents with     Hip Pain     right hip - fell 5/21/18         Problem list and histories reviewed & adjusted, as indicated.  Additional history: as documented                     Chief Complaint    The patient is a pleasant 67-year-old female who recently stumbled on the sidewalk and landed on her right hip.  She has a 2 inch abrasion on her knee which is healing nicely without signs of infection.  She notes that her hip is slightly uncomfortable but she is able to walk on with modest difficulty.  She notes that is worse when she tries to rotate her hip externally and putting her shoes on etc.  She also notes that the discomfort improves after she has been walking for bit.  This is all quite consistent with a traumatic bursitis.  The fall was about 10 days ago.  Otherwise, she is doing well.  Her blood pressure is well controlled she takes her medications compliantly.  She has type 2 diabetes and is not on insulin.  Her blood sugars have been controlled and her most recent glycosylated hemoglobin was 7.9 in February.  She notes that she has been working hard on dietary restriction.                           PAST, FAMILY,SOCIAL HISTORY:     Medical  History:   has a past medical history of Basal cell carcinoma and Endometrial cells on cervical Pap smear inconsistent w/LMP (11/11/15).     Surgical History:   has a past surgical history that includes REVISE MEDIAN N/CARPAL TUNNEL SURG (1985?); FLEX SIGMOIDOSCOPY W/WO DELILAH SPEC BY BRUSH/WASH (1997); and Dilation and curettage, operative hysteroscopy, combined (N/A, 2/16/2016).     Social History:   reports that she has never smoked. She has never used  smokeless tobacco. She reports that she does not drink alcohol or use illicit drugs.     Family History:  family history includes Breast Cancer in her sister; Cancer - colorectal in her mother; Cardiovascular in her father.            MEDICATIONS  Current Outpatient Prescriptions   Medication Sig Dispense Refill     acetaminophen (TYLENOL) 325 MG tablet Take 2 tablets (650 mg) by mouth every 4 hours as needed for mild pain 100 tablet      alendronate (FOSAMAX) 70 MG tablet Take 1 tablet (70 mg) by mouth every 7 days Take with over 8 ounces water and stay upright for at least 30 minutes after dose.  Take at least 60 minutes before breakfast 4 tablet 1     amLODIPine (NORVASC) 5 MG tablet TAKE ONE TABLET BY MOUTH EVERY DAY 90 tablet 3     aspirin 81 MG EC tablet Take 1 tablet (81 mg) by mouth daily 90 tablet 3     calcium-vitamin D (CALTRATE) 600-400 MG-UNIT per tablet Take 1 tablet by mouth 2 times daily       lisinopril (PRINIVIL/ZESTRIL) 10 MG tablet TAKE ONE TABLET BY MOUTH EVERY DAY 90 tablet 0     metFORMIN (GLUCOPHAGE-XR) 500 MG 24 hr tablet TAKE 3 TABLETS ( 1500 MG) BY MOUTH EVERY DAY WITH DINNER 90 tablet 5     simvastatin (ZOCOR) 10 MG tablet Take 1 tablet (10 mg) by mouth At Bedtime 30 tablet 7         --------------------------------------------------------------------------------------------------------------------                              Review of Systems     LUNGS: Pt denies: cough,excess sputum, hemoptysis, or shortness of breath.   HEART: Pt denies: chest pain, arrythmia, syncope, tachy or bradyarrhythmia.   GI: Pt denies: nausea, vomitting, diarrhea, constipation, melena, or hematochezia.   NEURO: Pt denies: seizures, strokes, diplopia, weakness, paraesthesias, or paralysis.   SKIN: Pt denies: itching, rashes, discoloration, or specific lesions of concern. Denies recent hair loss.   PSYCH: The patient denies significant depression, anxiety, mood imbalance. Specifically denies any suicidal  ideation.                                     Examination      /62 (BP Location: Right arm, Patient Position: Chair, Cuff Size: Adult Regular)  Pulse 92  Temp 97.1  F (36.2  C) (Temporal)  Wt 181 lb 12.8 oz (82.5 kg)  SpO2 98%  Breastfeeding? No  BMI 30.72 kg/m2   LUNGS: clear bilaterally, airflow is brisk, no intercostal retraction or stridor is noted. No coughing is noted during visit.   HEART:  regular without rubs, clicks, gallops, or murmurs. PMI is nondisplaced. Upstrokes are brisk. S1,S2 are heard.   GI: Abdomen is soft, without rebound, guarding or tenderness. Bowel sounds are appropriate. No renal bruits are heard.  Abdomen is obese.   NEURO: Pt is alert and appropriate. No neurologic lateralization is noted. Cranial nerves 2-12 are intact. Peripheral sensory and motor function are grossly normal.    MS: Minimal crepitance is noted in the right hip. No deformity is present. Muscle strength is appropriate and equal bilaterally. No acute joint erythema or swelling is present.  Reproducible discomfort is noted with external rotation of the hip.  No ecchymosis is noted.                                          Decision Making    1. Hip pain, right  Obtain x-ray to rule out fracture.  Doubt this.  Moist heat, ibuprofen as directed.  - XR Pelvis 1/2 Views; Future    2. Hyperlipidemia LDL goal <100  Check lipids, encourage low-fat diet  - Lipid panel reflex to direct LDL Fasting    3. Type 2 diabetes mellitus with hyperglycemia, without long-term current use of insulin (H)  Continue current medication, continue dietary restriction of carbohydrates  - TSH with free T4 reflex  - FOOT EXAM    4. Encounter for screening mammogram for breast cancer  Schedule  - *MA Screening Digital Bilateral; Future    5. Special screening for malignant neoplasms, colon  Instructions given  - Fecal colorectal cancer screen (FIT); Future    6. Benign essential hypertension  Currently controlled                           FOLLOW UP   I have asked the patient to make an appointment for followup with me in 1 month for follow-up of her diabetes.  Further recommendations predicated upon her results and x-ray results        I have carefully explained the diagnosis and treatment options to the patient.  The patient has displayed an understanding of the above, and all subsequent questions were answered.      DO ROLO Elias    Portions of this note were produced using [employer]  Although every attempt at real-time proof reading has been made, occasional grammar/syntax errors may have been missed.

## 2018-06-01 DIAGNOSIS — M85.89 OSTEOPENIA OF MULTIPLE SITES: ICD-10-CM

## 2018-06-01 RX ORDER — ALENDRONATE SODIUM 70 MG/1
70 TABLET ORAL
Qty: 4 TABLET | Refills: 1 | Status: SHIPPED | OUTPATIENT
Start: 2018-06-01 | End: 2018-07-30

## 2018-06-01 NOTE — TELEPHONE ENCOUNTER
"Requested Prescriptions   Pending Prescriptions Disp Refills     alendronate (FOSAMAX) 70 MG tablet 4 tablet 1     Sig: Take 1 tablet (70 mg) by mouth every 7 days Take with over 8 ounces water and stay upright for at least 30 minutes after dose.  Take at least 60 minutes before breakfast    Bisphosphonates Failed    6/1/2018  2:17 PM       Failed - Dexa on file within past 2 years    Please review last Dexa result.          Failed - Normal serum creatinine on file within past 12 months    Recent Labs   Lab Test  02/13/18   1255   CR  1.08*            Passed - Recent (12 mo) or future (30 days) visit within the authorizing provider's specialty    Patient had office visit in the last 12 months or has a visit in the next 30 days with authorizing provider or within the authorizing provider's specialty.  See \"Patient Info\" tab in inbasket, or \"Choose Columns\" in Meds & Orders section of the refill encounter.           Passed - Patient is age 18 or older          Last Written Prescription Date:  4/6/18  Last Fill Quantity: 4,  # refills: 1   Last Office Visit with G, P or [facility] prescribing provider:  5/31/18   Future Office Visit:       "

## 2018-06-04 ENCOUNTER — TELEPHONE (OUTPATIENT)
Dept: FAMILY MEDICINE | Facility: OTHER | Age: 68
End: 2018-06-04

## 2018-06-04 NOTE — TELEPHONE ENCOUNTER
Reason for Call:  Request for results:    Name of test or procedure: Pelvis xray    Date of test of procedure: 5/31/18    Location of the test or procedure: [facility] to leave the result message on voice mail or with a family member? YES    Phone number Patient can be reached at:  Home number on file [phone] (home)    Additional comments: patient hasn't heard from anyone yet.  She know's that she would of heard back if something was broke right away, but she is still gimping.    Call taken on 6/4/2018 at 3:58 PM by Yaima Velez

## 2018-06-05 NOTE — TELEPHONE ENCOUNTER
Please contact the patient and notify her of the following:    Moderate arthritis in both hips is noted.  There is a small possibility that a right hip fracture is noted.  If pain continues, would recommend additional x-ray views or possibly MRI.    Labs show the thyroid is well adjusted.  The cholesterol is well controlled with an LDL of 55.    Thank you.   DO ROLO Elias

## 2018-06-05 NOTE — PROGRESS NOTES
Please contact the patient and notify her of the following:    Moderate arthritis in both hips is noted.  There is a small possibility that a right hip fracture is noted.  If pain continues, would recommend additional x-ray views or possibly MRI.  Thank you.   DO ROLO Elias

## 2018-06-05 NOTE — PROGRESS NOTES
Please contact the patient and notify her of the following:  The thyroid is well adjusted.  The cholesterol is well controlled with an LDL of 55.      Thank you.   DO ROLO Elias

## 2018-06-08 ENCOUNTER — TELEPHONE (OUTPATIENT)
Dept: FAMILY MEDICINE | Facility: OTHER | Age: 68
End: 2018-06-08

## 2018-06-08 NOTE — TELEPHONE ENCOUNTER
Recommend patient continue his current medications and notified me of her progress in the upcoming week.    Caro

## 2018-06-08 NOTE — TELEPHONE ENCOUNTER
Reason for Call:  Other call back    Detailed comments: Pt states she's doing better today. Please call her and advise, she was not sure what Caro wanted to do next. She wants to talk to you about it, please call her back.     Phone Number Patient can be reached at: Home number on file [phone] (home)    Best Time: anytime    Can we leave a detailed message on this number? YES    Call taken on 6/8/2018 at 1:50 PM by Amanda Tabares

## 2018-06-18 ENCOUNTER — TELEPHONE (OUTPATIENT)
Dept: FAMILY MEDICINE | Facility: OTHER | Age: 68
End: 2018-06-18

## 2018-06-18 DIAGNOSIS — M25.551 HIP PAIN, RIGHT: Primary | ICD-10-CM

## 2018-06-18 NOTE — TELEPHONE ENCOUNTER
In order for an MRI of the hip has been placed.  Could you please help the patient set this up?    Caro

## 2018-06-18 NOTE — TELEPHONE ENCOUNTER
Reason for Call:  Other call back    Detailed comments: Pt wants Caro's nurse to call her back to discuss. She is to call back and let her know how she's doing. She's doing good, but would like to talk to nurse more about it. No urgency.     Phone Number Patient can be reached at: Home number on file [phone] (home)    Best Time: anytime    Can we leave a detailed message on this number? YES    Call taken on 6/18/2018 at 1:22 PM by Amanda Tabares

## 2018-06-18 NOTE — TELEPHONE ENCOUNTER
"Patient states she doing better, but not perfect. \"Things are minorly improving.\" She is sleeping fine, and has been able to drive a little bit without much pain. She is wondering if she should get an MRI as (discussed might be needed) just to make sure everything is okay and not going to cause problems long term. She would like to get it done for her peace of mind just to make sure things are improving. Please advise.   "

## 2018-06-21 ENCOUNTER — HOSPITAL ENCOUNTER (OUTPATIENT)
Dept: MRI IMAGING | Facility: CLINIC | Age: 68
Discharge: HOME | End: 2018-06-21
Attending: INTERNAL MEDICINE | Admitting: INTERNAL MEDICINE
Payer: MEDICARE

## 2018-06-21 ENCOUNTER — TELEPHONE (OUTPATIENT)
Dept: FAMILY MEDICINE | Facility: OTHER | Age: 68
End: 2018-06-21

## 2018-06-21 DIAGNOSIS — R93.89 ABNORMAL MRI: Primary | ICD-10-CM

## 2018-06-21 DIAGNOSIS — M25.551 HIP PAIN, RIGHT: ICD-10-CM

## 2018-06-21 PROCEDURE — 73721 MRI JNT OF LWR EXTRE W/O DYE: CPT | Mod: RT

## 2018-06-21 NOTE — TELEPHONE ENCOUNTER
Radiologist called with MRI report for patient.  She has an evulsion fracture of the greater trochanter. She has other abnormalities, and she should not be walking on her leg. She also needs to get in with Ortho quickly. Patient was notified per Dr. Elizondo and is scheduled to see ortho tomorrow at 9:30 am in [place]. Katina GARNER

## 2018-06-22 ENCOUNTER — OFFICE VISIT (OUTPATIENT)
Dept: ORTHOPEDICS | Facility: CLINIC | Age: 68
End: 2018-06-22
Payer: COMMERCIAL

## 2018-06-22 VITALS
SYSTOLIC BLOOD PRESSURE: 143 MMHG | BODY MASS INDEX: 30.16 KG/M2 | TEMPERATURE: 98.2 F | DIASTOLIC BLOOD PRESSURE: 83 MMHG | WEIGHT: 181 LBS | HEIGHT: 65 IN

## 2018-06-22 DIAGNOSIS — S72.144D CLOSED NONDISPLACED INTERTROCHANTERIC FRACTURE OF RIGHT FEMUR WITH ROUTINE HEALING, SUBSEQUENT ENCOUNTER: Primary | ICD-10-CM

## 2018-06-22 PROCEDURE — 99203 OFFICE O/P NEW LOW 30 MIN: CPT | Performed by: ORTHOPAEDIC SURGERY

## 2018-06-22 ASSESSMENT — PAIN SCALES - GENERAL: PAINLEVEL: MILD PAIN (2)

## 2018-06-22 NOTE — LETTER
6/22/2018         RE: Mera Valdivia  [address] [facility] [address]        Dear Colleague,    Thank you for referring your patient, Mera Valdivia, to the [facility]. Please see a copy of my visit note below.    ORTHOPEDIC CONSULT      Chief Complaint: Mera Valdivia is a 67 year old female who is retired      She is being seen for   Chief Complaints and History of Present Illnesses   Patient presents with     Pain     right hip pain      Fall     DOI 5-     Consult     referring Caro         History of Present Illness:   Mera Valdivia is a 67 year old female who is seen in consultation at the request of Caro.  History of Present illness:  Mera presents for evaluation of:   1.) Right hip  Onset:  5/21/18    Symptoms brought on by fall at home on the sidewalk.   Location:  Right hip.    Character:  dull ache.    Progression of symptoms:  better.    Previous similar pain: no .   Pain Level:  2/10.   Previous treatments:  rest/inactivity, heat and Ibuprofen.  Currently on Blood thinners? no  Diagnosis of Diabetes? yes  Initially was very sore, no bruising, limped  Now much better - can rotate hip, cross legs, get up and walk without a limp, go on stairs      Patient's past medical, surgical, social and family histories reviewed.       Past Medical History:   Diagnosis Date     Basal cell carcinoma      Endometrial cells on cervical Pap smear inconsistent w/LMP 11/11/15    pap NIL/neg          Past Surgical History:   Procedure Laterality Date     DILATION AND CURETTAGE, OPERATIVE HYSTEROSCOPY, COMBINED N/A 2/16/2016    Procedure: COMBINED DILATION AND CURETTAGE, OPERATIVE HYSTEROSCOPY;  Surgeon: Jenae Walden MD;  Location:  OR     HC FLEX SIGMOIDOSCOPY W/WO DELILAH SPEC BY BRUSH/WASH  1997     HC REVISE MEDIAN N/CARPAL TUNNEL SURG  1985?         Medications:    Current Outpatient Prescriptions on File Prior to Visit:  acetaminophen (TYLENOL) 325 MG  "tablet Take 2 tablets (650 mg) by mouth every 4 hours as needed for mild pain   alendronate (FOSAMAX) 70 MG tablet Take 1 tablet (70 mg) by mouth every 7 days Take with over 8 ounces water and stay upright for at least 30 minutes after dose.  Take at least 60 minutes before breakfast   amLODIPine (NORVASC) 5 MG tablet TAKE ONE TABLET BY MOUTH EVERY DAY   aspirin 81 MG EC tablet Take 1 tablet (81 mg) by mouth daily   calcium-vitamin D (CALTRATE) 600-400 MG-UNIT per tablet Take 1 tablet by mouth 2 times daily   lisinopril (PRINIVIL/ZESTRIL) 10 MG tablet TAKE ONE TABLET BY MOUTH EVERY DAY   metFORMIN (GLUCOPHAGE-XR) 500 MG 24 hr tablet TAKE 3 TABLETS ( 1500 MG) BY MOUTH EVERY DAY WITH DINNER   simvastatin (ZOCOR) 10 MG tablet Take 1 tablet (10 mg) by mouth At Bedtime     No current facility-administered medications on file prior to visit.       Allergies   Allergen Reactions     Nitrous Oxide      Doesn't recall any reaction to this drug         Social History     Occupational History     Not on file.     Social History Main Topics     Smoking status: Never Smoker     Smokeless tobacco: Never Used     Alcohol use No     Drug use: No     Sexual activity: No       Patient does not use Tobacco products.      Family History   Problem Relation Age of Onset     Breast Cancer Sister      Cancer - colorectal Mother      Cardiovascular Father      QUADRUPLE BYPASS IN 1992         REVIEW OF SYSTEMS  10 point review systems performed otherwise negative as noted as per history of present illness.      Physical Exam:  Vitals: /83 (BP Location: Left arm, Patient Position: Chair, Cuff Size: Adult Regular)  Temp 98.2  F (36.8  C) (Temporal)  Ht 1.638 m (5' 4.5\")  Wt 82.1 kg (181 lb)  BMI 30.59 kg/m2  BMI= Body mass index is 30.59 kg/(m^2).    Constitutional: healthy, alert and no acute distress   Psychiatric: mentation appears normal and affect normal/bright  NEURO: no focal deficits  RESP: Normal with easy respirations and " no use of accessory muscles to breathe, no audible wheezing or retractions  CV: regular pulse  SKIN: No erythema, rashes, excoriation, or breakdown. No evidence of infection.   JOINT/EXTREMITIES:right Hip Exam: Palpation: Tender:   right greater trochanter, very mild  Range of Motion:  Full ROM, both hips, no pain  Strength:  full strength    GAIT: non-antalgic  Lymph: no palpable lymph nodes    Diagnostic Modalities:  right hip MRI:  Small avulsion fx medial greater trochanter, hairline incomplete intertrochanteric fx, bone contusion intertrochanteric area  Independent visualization of the images was performed.      Impression: right hip hairline/incomplete intertrochanteric fracture with small greater trochanteric avulsion fracture 1 month out already, doing well, plan nonop tx    Plan:  All of the above pertinent physical exam and imaging modalities findings was reviewed with Mera and her .                                          CONSERVATIVE CARE:  I recommend conservative care for the patient to include NSAIDs, Tylenol, activity modifications, rest for 2 more weeks. May be WBAT.  Today I provided or dispensed info.    BP Readings from Last 1 Encounters:   06/22/18 143/83       BP noted to be well controlled today in office.     Return to clinic 1, month(s), or sooner as needed for changes.  Re-x-ray on return: Yes.    Libertad Angel M.D.      Again, thank you for allowing me to participate in the care of your patient.        Sincerely,        Libertad Angel MD

## 2018-06-22 NOTE — PROGRESS NOTES
ORTHOPEDIC CONSULT      Chief Complaint: Mera Valdivia is a 67 year old female who is retired      She is being seen for   Chief Complaints and History of Present Illnesses   Patient presents with     Pain     right hip pain      Fall     DOI 5-     Consult     referring Caro         History of Present Illness:   Mera Valdivia is a 67 year old female who is seen in consultation at the request of Caro.  History of Present illness:  Mera presents for evaluation of:   1.) Right hip  Onset:  5/21/18    Symptoms brought on by fall at home on the sidewalk.   Location:  Right hip.    Character:  dull ache.    Progression of symptoms:  better.    Previous similar pain: no .   Pain Level:  2/10.   Previous treatments:  rest/inactivity, heat and Ibuprofen.  Currently on Blood thinners? no  Diagnosis of Diabetes? yes  Initially was very sore, no bruising, limped  Now much better - can rotate hip, cross legs, get up and walk without a limp, go on stairs      Patient's past medical, surgical, social and family histories reviewed.       Past Medical History:   Diagnosis Date     Basal cell carcinoma      Endometrial cells on cervical Pap smear inconsistent w/LMP 11/11/15    pap NIL/neg          Past Surgical History:   Procedure Laterality Date     DILATION AND CURETTAGE, OPERATIVE HYSTEROSCOPY, COMBINED N/A 2/16/2016    Procedure: COMBINED DILATION AND CURETTAGE, OPERATIVE HYSTEROSCOPY;  Surgeon: Jenae Walden MD;  Location:  OR      FLEX SIGMOIDOSCOPY W/WO DELILAH SPEC BY BRUSH/WASH  1997     HC REVISE MEDIAN N/CARPAL TUNNEL SURG  1985?         Medications:    Current Outpatient Prescriptions on File Prior to Visit:  acetaminophen (TYLENOL) 325 MG tablet Take 2 tablets (650 mg) by mouth every 4 hours as needed for mild pain   alendronate (FOSAMAX) 70 MG tablet Take 1 tablet (70 mg) by mouth every 7 days Take with over 8 ounces water and stay upright for at least 30 minutes after dose.  Take at  "least 60 minutes before breakfast   amLODIPine (NORVASC) 5 MG tablet TAKE ONE TABLET BY MOUTH EVERY DAY   aspirin 81 MG EC tablet Take 1 tablet (81 mg) by mouth daily   calcium-vitamin D (CALTRATE) 600-400 MG-UNIT per tablet Take 1 tablet by mouth 2 times daily   lisinopril (PRINIVIL/ZESTRIL) 10 MG tablet TAKE ONE TABLET BY MOUTH EVERY DAY   metFORMIN (GLUCOPHAGE-XR) 500 MG 24 hr tablet TAKE 3 TABLETS ( 1500 MG) BY MOUTH EVERY DAY WITH DINNER   simvastatin (ZOCOR) 10 MG tablet Take 1 tablet (10 mg) by mouth At Bedtime     No current facility-administered medications on file prior to visit.       Allergies   Allergen Reactions     Nitrous Oxide      Doesn't recall any reaction to this drug         Social History     Occupational History     Not on file.     Social History Main Topics     Smoking status: Never Smoker     Smokeless tobacco: Never Used     Alcohol use No     Drug use: No     Sexual activity: No       Patient does not use Tobacco products.      Family History   Problem Relation Age of Onset     Breast Cancer Sister      Cancer - colorectal Mother      Cardiovascular Father      QUADRUPLE BYPASS IN 1992         REVIEW OF SYSTEMS  10 point review systems performed otherwise negative as noted as per history of present illness.      Physical Exam:  Vitals: /83 (BP Location: Left arm, Patient Position: Chair, Cuff Size: Adult Regular)  Temp 98.2  F (36.8  C) (Temporal)  Ht 1.638 m (5' 4.5\")  Wt 82.1 kg (181 lb)  BMI 30.59 kg/m2  BMI= Body mass index is 30.59 kg/(m^2).    Constitutional: healthy, alert and no acute distress   Psychiatric: mentation appears normal and affect normal/bright  NEURO: no focal deficits  RESP: Normal with easy respirations and no use of accessory muscles to breathe, no audible wheezing or retractions  CV: regular pulse  SKIN: No erythema, rashes, excoriation, or breakdown. No evidence of infection.   JOINT/EXTREMITIES:right Hip Exam: Palpation: Tender:   right greater " trochanter, very mild  Range of Motion:  Full ROM, both hips, no pain  Strength:  full strength    GAIT: non-antalgic  Lymph: no palpable lymph nodes    Diagnostic Modalities:  right hip MRI:  Small avulsion fx medial greater trochanter, hairline incomplete intertrochanteric fx, bone contusion intertrochanteric area  Independent visualization of the images was performed.      Impression: right hip hairline/incomplete intertrochanteric fracture with small greater trochanteric avulsion fracture 1 month out already, doing well, plan nonop tx    Plan:  All of the above pertinent physical exam and imaging modalities findings was reviewed with Mera and her .                                          CONSERVATIVE CARE:  I recommend conservative care for the patient to include NSAIDs, Tylenol, activity modifications, rest for 2 more weeks. May be WBAT.  Today I provided or dispensed info.    BP Readings from Last 1 Encounters:   06/22/18 143/83       BP noted to be well controlled today in office.     Return to clinic 1, month(s), or sooner as needed for changes.  Re-x-ray on return: Yes.    Libertad Angel M.D.

## 2018-06-22 NOTE — MR AVS SNAPSHOT
"              After Visit Summary   6/22/2018    Mera Valdivia    MRN: [id number]           Patient Information     Date Of Birth          1950        Visit Information        Provider Department      6/22/2018 9:30 AM Libertad Angel MD [facility]        Today's Diagnoses     Closed nondisplaced intertrochanteric fracture of right femur with routine healing, subsequent encounter    -  1       Follow-ups after your visit        Who to contact     If you have questions or need follow up information about today's clinic visit or your schedule please contact [facility] directly at [phone].  Normal or non-critical lab and imaging results will be communicated to you by MyChart, letter or phone within 4 business days after the clinic has received the results. If you do not hear from us within 7 days, please contact the clinic through MyChart or phone. If you have a critical or abnormal lab result, we will notify you by phone as soon as possible.  Submit refill requests through [employer] or call your pharmacy and they will forward the refill request to us. Please allow 3 business days for your refill to be completed.          Additional Information About Your Visit        Care EveryWhere ID     This is your Care EveryWhere ID. This could be used by other organizations to access your [facility] medical records  [id number]        Your Vitals Were     Temperature Height BMI (Body Mass Index)             98.2  F (36.8  C) (Temporal) 1.638 m (5' 4.5\") 30.59 kg/m2          Blood Pressure from Last 3 Encounters:   06/22/18 143/83   05/31/18 102/62   12/12/17 110/65    Weight from Last 3 Encounters:   06/22/18 82.1 kg (181 lb)   05/31/18 82.5 kg (181 lb 12.8 oz)   11/08/17 82.1 kg (181 lb)              Today, you had the following     No orders found for display       Primary Care Provider Office Phone # Fax #    William Elizondo [phone]       [phone] [place] [address]        Equal Access to Services     KIMBERLY PABLO : Hadii aad ku hadcaitlincandis Levin, waaxda jacobyadaha, qaybta alexandrea tao, antoni hancock. So [facility] [phone].    ATENCIÓN: Si habla español, tiene a woods disposición servicios gratuitos de asistencia lingüística. [facility] [phone].    We comply with applicable federal civil rights laws and [place] laws. We do not discriminate on the basis of race, color, national origin, age, disability, sex, sexual orientation, or gender identity.            Thank you!     Thank you for choosing [facility]  for your care. Our goal is always to provide you with excellent care. Hearing back from our patients is one way we can continue to improve our services. Please take a few minutes to complete the written survey that you may receive in the mail after your visit with us. Thank you!             Your Updated Medication List - Protect others around you: Learn how to safely use, store and throw away your medicines at www.disposemymeds.org.          This list is accurate as of 6/22/18 11:25 AM.  Always use your most recent med list.                   Brand Name Dispense Instructions for use Diagnosis    acetaminophen 325 MG tablet    TYLENOL    100 tablet    Take 2 tablets (650 mg) by mouth every 4 hours as needed for mild pain        alendronate 70 MG tablet    FOSAMAX    4 tablet    Take 1 tablet (70 mg) by mouth every 7 days Take with over 8 ounces water and stay upright for at least 30 minutes after dose.  Take at least 60 minutes before breakfast    Osteopenia of multiple sites       amLODIPine 5 MG tablet    NORVASC    90 tablet    TAKE ONE TABLET BY MOUTH EVERY DAY    Hyperlipidemia LDL goal <100       aspirin 81 MG EC tablet     90 tablet    Take 1 tablet (81 mg) by mouth daily    Essential hypertension       calcium-vitamin D 600-400 MG-UNIT per tablet    CALTRATE     Take 1 tablet by mouth 2  times daily        lisinopril 10 MG tablet    PRINIVIL/ZESTRIL    90 tablet    TAKE ONE TABLET BY MOUTH EVERY DAY    Type 2 diabetes mellitus with hyperglycemia, without long-term current use of insulin (H)       metFORMIN 500 MG 24 hr tablet    GLUCOPHAGE-XR    90 tablet    TAKE 3 TABLETS ( 1500 MG) BY MOUTH EVERY DAY WITH DINNER    Type 2 diabetes mellitus with hyperglycemia, without long-term current use of insulin (H)       simvastatin 10 MG tablet    ZOCOR    30 tablet    Take 1 tablet (10 mg) by mouth At Bedtime    Type 2 diabetes mellitus with hyperglycemia, unspecified long term insulin use status (H), Hyperlipidemia LDL goal <100

## 2018-06-25 DIAGNOSIS — E11.65 TYPE 2 DIABETES MELLITUS WITH HYPERGLYCEMIA, WITHOUT LONG-TERM CURRENT USE OF INSULIN (H): ICD-10-CM

## 2018-06-25 NOTE — TELEPHONE ENCOUNTER
"Requested Prescriptions   Pending Prescriptions Disp Refills     lisinopril (PRINIVIL/ZESTRIL) 10 MG tablet 90 tablet 0    Last Written Prescription Date:  03/28/2018  Last Fill Quantity: 90,  # refills: 0   Last office visit: 11/8/2017 with prescribing provider:  11/08/2017   Future Office Visit:     Sig: Take 1 tablet (10 mg) by mouth daily    ACE Inhibitors (Including Combos) Protocol Failed    6/25/2018 12:44 PM       Failed - Blood pressure under 140/90 in past 12 months    BP Readings from Last 3 Encounters:   06/22/18 143/83   05/31/18 102/62   12/12/17 110/65                Failed - Normal serum creatinine on file in past 12 months    Recent Labs   Lab Test  02/13/18   1255   CR  1.08*            Failed - Normal serum potassium on file in past 12 months    Recent Labs   Lab Test  10/29/16   2102   POTASSIUM  4.3            Passed - Recent (12 mo) or future (30 days) visit within the authorizing provider's specialty    Patient had office visit in the last 12 months or has a visit in the next 30 days with authorizing provider or within the authorizing provider's specialty.  See \"Patient Info\" tab in inbasket, or \"Choose Columns\" in Meds & Orders section of the refill encounter.           Passed - Patient is age 18 or older       Passed - No active pregnancy on record       Passed - No positive pregnancy test in past 12 months          "

## 2018-06-26 RX ORDER — LISINOPRIL 10 MG/1
10 TABLET ORAL DAILY
Qty: 90 TABLET | Refills: 0 | Status: SHIPPED | OUTPATIENT
Start: 2018-06-26 | End: 2018-09-21

## 2018-06-26 NOTE — TELEPHONE ENCOUNTER
Routing refill request to provider for review/approval because:  Labs out of range:  BP, Creatinine  Labs not current:  Potassium    Wanda Galvan RN  [facility]

## 2018-06-27 NOTE — PROGRESS NOTES
Please contact the patient and notify her of the following:  An occult hip fracture is noted.    Patient has already been set up with orthopedics.    Thank you.   DO ROLO Elias

## 2018-07-30 DIAGNOSIS — M85.89 OSTEOPENIA OF MULTIPLE SITES: ICD-10-CM

## 2018-07-30 PROCEDURE — G0328 FECAL BLOOD SCRN IMMUNOASSAY: HCPCS | Performed by: INTERNAL MEDICINE

## 2018-07-30 NOTE — TELEPHONE ENCOUNTER
"Requested Prescriptions   Pending Prescriptions Disp Refills     alendronate (FOSAMAX) 70 MG tablet 4 tablet 1    Last Written Prescription Date:  6-1-18  Last Fill Quantity: 4,  # refills: 1   Last office visit: 5/31/2018 with prescribing provider: 5/31/18    Future Office Visit:     Sig: Take 1 tablet (70 mg) by mouth every 7 days Take with over 8 ounces water and stay upright for at least 30 minutes after dose.  Take at least 60 minutes before breakfast    Bisphosphonates Failed    7/30/2018 10:15 AM       Failed - Dexa on file within past 2 years    Please review last Dexa result.          Failed - Normal serum creatinine on file within past 12 months    Recent Labs   Lab Test  02/13/18   1255   CR  1.08*            Passed - Recent (12 mo) or future (30 days) visit within the authorizing provider's specialty    Patient had office visit in the last 12 months or has a visit in the next 30 days with authorizing provider or within the authorizing provider's specialty.  See \"Patient Info\" tab in inbasket, or \"Choose Columns\" in Meds & Orders section of the refill encounter.           Passed - Patient is age 18 or older          "

## 2018-08-01 ENCOUNTER — TELEPHONE (OUTPATIENT)
Dept: INTERNAL MEDICINE | Facility: CLINIC | Age: 68
End: 2018-08-01

## 2018-08-01 DIAGNOSIS — Z12.11 SPECIAL SCREENING FOR MALIGNANT NEOPLASMS, COLON: ICD-10-CM

## 2018-08-01 LAB — HEMOCCULT STL QL IA: NEGATIVE

## 2018-08-01 RX ORDER — ALENDRONATE SODIUM 70 MG/1
70 TABLET ORAL
Qty: 4 TABLET | Refills: 1 | Status: SHIPPED | OUTPATIENT
Start: 2018-08-01 | End: 2018-09-21

## 2018-08-01 NOTE — LETTER
August 10, 2018      Mera Valdivia  [address] [facility] [address] [facility] [address]        Dear MsMarilynn,    We are writing to inform you of your test results.    Your test results fall within the expected range(s) or remain unchanged from previous results.  Please continue with current treatment plan.    Resulted Orders   Fecal colorectal cancer screen (FIT)   Result Value Ref Range    Occult Blood Scn FIT Negative NEG^Negative       If you have any questions or concerns, please call the clinic at the number listed above.       Sincerely,        William Elizondo DO

## 2018-08-01 NOTE — TELEPHONE ENCOUNTER
Rx faxed to pharmacy - [place] Cinthya Waggoner XRO/  [facility]

## 2018-08-01 NOTE — TELEPHONE ENCOUNTER
Patient looking to  refill for Fosamax. Epic shows approved 8-1-18 and was a local print, any chance you can please resend to pharmacy. Patient would like to  sometime on Thursday. Thanks so much      Allan Dennsi  Pharmacy Float Tech  On Behalf of [facility]

## 2018-08-01 NOTE — TELEPHONE ENCOUNTER
Routing refill request to provider for review/approval because:  Labs out of range:  CR   Labs not current:  Dexa (4/2015)  Creatinine   Date Value Ref Range Status   02/13/2018 1.08 (H) 0.52 - 1.04 mg/dL Final       Dora Waggoner RN

## 2018-08-10 NOTE — PROGRESS NOTES
Please contact the patient and notify her of the following:  There was no blood in the stool sample as tested.    Thank you.   DO ROLO Elias

## 2018-08-18 ENCOUNTER — TELEPHONE (OUTPATIENT)
Dept: INFECTION CONTROL | Facility: CLINIC | Age: 68
End: 2018-08-18

## 2018-08-18 NOTE — TELEPHONE ENCOUNTER
8/18/2018        Patient is aware of preventative health screening and will schedule on their own time.           Outreach ,  Marcelina Arzate

## 2018-08-31 DIAGNOSIS — E11.65 TYPE 2 DIABETES MELLITUS WITH HYPERGLYCEMIA, WITHOUT LONG-TERM CURRENT USE OF INSULIN (H): ICD-10-CM

## 2018-09-04 RX ORDER — METFORMIN HCL 500 MG
TABLET, EXTENDED RELEASE 24 HR ORAL
Qty: 90 TABLET | Refills: 3 | Status: SHIPPED | OUTPATIENT
Start: 2018-09-04 | End: 2018-12-31

## 2018-09-04 NOTE — TELEPHONE ENCOUNTER
"Requested Prescriptions   Pending Prescriptions Disp Refills     metFORMIN (GLUCOPHAGE-XR) 500 MG 24 hr tablet [Pharmacy Med Name: METFORMIN HCL ER 500MG TB24] 90 tablet 5     Sig: TAKE 3 TABLETS ( 1500 MG) BY MOUTH EVERY DAY WITH DINNER    Biguanide Agents Failed    8/31/2018  5:33 PM       Failed - Blood pressure less than 140/90 in past 6 months    BP Readings from Last 3 Encounters:   06/22/18 143/83   05/31/18 102/62   12/12/17 110/65                Failed - Patient has documented A1c within the specified period of time.    If HgbA1C is 8 or greater, it needs to be on file within the past 3 months.  If less than 8, must be on file within the past 6 months.     Recent Labs   Lab Test  02/13/18   1255   A1C  7.9*            Passed - Patient has documented LDL within the past 12 mos.    Recent Labs   Lab Test  05/31/18   0854   LDL  55            Passed - Patient has had a Microalbumin in the past 15 mos.    Recent Labs   Lab Test  02/13/18   1306   MICROL  <20   UMALCR  6.19            Passed - Patient is age 10 or older       Passed - Patient's CR is NOT>1.4 OR Patient's EGFR is NOT<45 within past 12 mos.    Recent Labs   Lab Test  02/13/18   1255   GFRESTIMATED  50*   GFRESTBLACK  61       Recent Labs   Lab Test  02/13/18   1255   CR  1.08*            Passed - Patient does NOT have a diagnosis of CHF.       Passed - Patient is not pregnant       Passed - Patient has not had a positive pregnancy test within the past 12 mos.        Passed - Recent (6 mo) or future (30 days) visit within the authorizing provider's specialty    Patient had office visit in the last 6 months or has a visit in the next 30 days with authorizing provider or within the authorizing provider's specialty.  See \"Patient Info\" tab in inbasket, or \"Choose Columns\" in Meds & Orders section of the refill encounter.            Last Written Prescription Date:  3/7/18  Last Fill Quantity: 90,  # refills: 5   Last office visit: 5/31/2018 with " prescribing provider:     Future Office Visit:

## 2018-09-04 NOTE — TELEPHONE ENCOUNTER
Routing refill request to provider for review/approval because:  Labs out of range:  BP, GFR, Creatinine  Labs not current:  A1C    MAX Silverio, RN  [facility]

## 2018-09-06 ENCOUNTER — HOSPITAL ENCOUNTER (OUTPATIENT)
Dept: MAMMOGRAPHY | Facility: CLINIC | Age: 68
Discharge: HOME | End: 2018-09-06
Attending: INTERNAL MEDICINE | Admitting: INTERNAL MEDICINE
Payer: MEDICARE

## 2018-09-06 DIAGNOSIS — Z12.31 VISIT FOR SCREENING MAMMOGRAM: ICD-10-CM

## 2018-09-06 PROCEDURE — 77063 BREAST TOMOSYNTHESIS BI: CPT

## 2018-09-21 DIAGNOSIS — E11.65 TYPE 2 DIABETES MELLITUS WITH HYPERGLYCEMIA, WITHOUT LONG-TERM CURRENT USE OF INSULIN (H): ICD-10-CM

## 2018-09-21 DIAGNOSIS — M85.89 OSTEOPENIA OF MULTIPLE SITES: ICD-10-CM

## 2018-09-21 NOTE — TELEPHONE ENCOUNTER
"Requested Prescriptions   Pending Prescriptions Disp Refills     alendronate (FOSAMAX) 70 MG tablet 4 tablet 1    Last Written Prescription Date:  08/01/2018  Last Fill Quantity: 4,  # refills: 1   Last office visit: 11/8/2017 with prescribing provider:  05/31/2018-HARJINDER villavicencio    Future Office Visit:     Sig: Take 1 tablet (70 mg) by mouth every 7 days Take with over 8 ounces water and stay upright for at least 30 minutes after dose.  Take at least 60 minutes before breakfast    Bisphosphonates Failed    9/21/2018 12:51 PM       Failed - Dexa on file within past 2 years    Please review last Dexa result.          Failed - Normal serum creatinine on file within past 12 months    Recent Labs   Lab Test  02/13/18   1255   CR  1.08*            Passed - Recent (12 mo) or future (30 days) visit within the authorizing provider's specialty    Patient had office visit in the last 12 months or has a visit in the next 30 days with authorizing provider or within the authorizing provider's specialty.  See \"Patient Info\" tab in inbasket, or \"Choose Columns\" in Meds & Orders section of the refill encounter.           Passed - Patient is age 18 or older          "

## 2018-09-24 RX ORDER — ALENDRONATE SODIUM 70 MG/1
70 TABLET ORAL
Qty: 4 TABLET | Refills: 0 | Status: SHIPPED | OUTPATIENT
Start: 2018-09-24 | End: 2018-10-19

## 2018-09-24 RX ORDER — LISINOPRIL 10 MG/1
TABLET ORAL
Qty: 90 TABLET | Refills: 0 | Status: SHIPPED | OUTPATIENT
Start: 2018-09-24 | End: 2018-12-21

## 2018-09-24 NOTE — TELEPHONE ENCOUNTER
Routing refill request to provider for review/approval because:  Labs out of range:  BP, Creatinine  Labs not current:  Potassium    MAX Silverio, RN  [facility]

## 2018-09-24 NOTE — TELEPHONE ENCOUNTER
"Requested Prescriptions   Pending Prescriptions Disp Refills     lisinopril (PRINIVIL/ZESTRIL) 10 MG tablet [Pharmacy Med Name: LISINOPRIL 10MG TABS] 90 tablet 0    Last Written Prescription Date:  6/26/2018  Last Fill Quantity: 90,  # refills: 0   Last office visit: 5/31/2018 with prescribing provider:  Dr. Elizondo   Future Office Visit:     Sig: TAKE ONE TABLET BY MOUTH EVERY DAY    ACE Inhibitors (Including Combos) Protocol Failed    9/21/2018  5:18 PM       Failed - Blood pressure under 140/90 in past 12 months    BP Readings from Last 3 Encounters:   06/22/18 143/83   05/31/18 102/62   12/12/17 110/65                Failed - Normal serum creatinine on file in past 12 months    Recent Labs   Lab Test  02/13/18   1255   CR  1.08*            Failed - Normal serum potassium on file in past 12 months    Recent Labs   Lab Test  10/29/16   2102   POTASSIUM  4.3            Passed - Recent (12 mo) or future (30 days) visit within the authorizing provider's specialty    Patient had office visit in the last 12 months or has a visit in the next 30 days with authorizing provider or within the authorizing provider's specialty.  See \"Patient Info\" tab in inbasket, or \"Choose Columns\" in Meds & Orders section of the refill encounter.           Passed - Patient is age 18 or older       Passed - No active pregnancy on record       Passed - No positive pregnancy test in past 12 months          "

## 2018-09-24 NOTE — TELEPHONE ENCOUNTER
Routing refill request to provider for review/approval because:  Labs out of range:  Creatinine  Labs not current:  DEXA scan    MAX Silverio, RN  [facility]

## 2018-10-19 DIAGNOSIS — M85.89 OSTEOPENIA OF MULTIPLE SITES: ICD-10-CM

## 2018-10-23 RX ORDER — ALENDRONATE SODIUM 70 MG/1
70 TABLET ORAL
Qty: 4 TABLET | Refills: 1 | Status: SHIPPED | OUTPATIENT
Start: 2018-10-23 | End: 2018-12-17

## 2018-10-23 NOTE — TELEPHONE ENCOUNTER
"Requested Prescriptions   Pending Prescriptions Disp Refills     alendronate (FOSAMAX) 70 MG tablet 4 tablet 0    Last Written Prescription Date:  9/24/18  Last Fill Quantity: 4,  # refills: 0   Last office visit: 11/8/2017 with prescribing provider:  5/31/18   Future Office Visit:     Sig: Take 1 tablet (70 mg) by mouth every 7 days Take with over 8 ounces water and stay upright for at least 30 minutes after dose.  Take at least 60 minutes before breakfast    Bisphosphonates Failed    10/19/2018 12:23 PM       Failed - Dexa on file within past 2 years    Please review last Dexa result.          Failed - Normal serum creatinine on file within past 12 months    Recent Labs   Lab Test  02/13/18   1255   CR  1.08*            Passed - Recent (12 mo) or future (30 days) visit within the authorizing provider's specialty    Patient had office visit in the last 12 months or has a visit in the next 30 days with authorizing provider or within the authorizing provider's specialty.  See \"Patient Info\" tab in inbasket, or \"Choose Columns\" in Meds & Orders section of the refill encounter.             Passed - Patient is age 18 or older        "

## 2018-10-23 NOTE — TELEPHONE ENCOUNTER
Routing refill request to provider for review/approval because:  Labs out of range:  Creatinine  Labs not current:  DEXA scan    MAX Silverio, RN  [facility]

## 2018-10-25 DIAGNOSIS — M85.89 OSTEOPENIA OF MULTIPLE SITES: ICD-10-CM

## 2018-10-25 RX ORDER — ALENDRONATE SODIUM 70 MG/1
70 TABLET ORAL
Qty: 4 TABLET | Refills: 1 | Status: CANCELLED | OUTPATIENT
Start: 2018-10-25

## 2018-10-26 NOTE — TELEPHONE ENCOUNTER
Prescription was sent 10/23/18 for #4 with 1 refills.    Wanda Galvan RN  [facility]

## 2018-10-26 NOTE — TELEPHONE ENCOUNTER
"Requested Prescriptions   Pending Prescriptions Disp Refills     alendronate (FOSAMAX) 70 MG tablet 4 tablet 1    Last Written Prescription Date:  10/23/18  Last Fill Quantity: 4,  # refills: 1   Last office visit: 11/8/2017 with prescribing provider:  5/31/18   Future Office Visit:     Sig: Take 1 tablet (70 mg) by mouth every 7 days Take with over 8 ounces water and stay upright for at least 30 minutes after dose.  Take at least 60 minutes before breakfast    Bisphosphonates Failed    10/25/2018  3:43 PM       Failed - Dexa on file within past 2 years    Please review last Dexa result.          Failed - Normal serum creatinine on file within past 12 months    Recent Labs   Lab Test  02/13/18   1255   CR  1.08*            Passed - Recent (12 mo) or future (30 days) visit within the authorizing provider's specialty    Patient had office visit in the last 12 months or has a visit in the next 30 days with authorizing provider or within the authorizing provider's specialty.  See \"Patient Info\" tab in inbasket, or \"Choose Columns\" in Meds & Orders section of the refill encounter.             Passed - Patient is age 18 or older        "

## 2018-12-14 DIAGNOSIS — M85.89 OSTEOPENIA OF MULTIPLE SITES: ICD-10-CM

## 2018-12-17 RX ORDER — ALENDRONATE SODIUM 70 MG/1
70 TABLET ORAL
Qty: 4 TABLET | Refills: 1 | Status: SHIPPED | OUTPATIENT
Start: 2018-12-17 | End: 2019-02-19

## 2018-12-17 NOTE — TELEPHONE ENCOUNTER
"Alendronate Last Written Prescription Date:  10/23/18  Last Fill Quantity: 4,  # refills: 1   Last office visit: 5/31/2018 with prescribing provider:  William Elizondo   Future Office Visit:      Requested Prescriptions   Pending Prescriptions Disp Refills     alendronate (FOSAMAX) 70 MG tablet 4 tablet 1     Sig: Take 1 tablet (70 mg) by mouth every 7 days Take with over 8 ounces water and stay upright for at least 30 minutes after dose.  Take at least 60 minutes before breakfast    Bisphosphonates Failed - 12/14/2018  3:20 PM       Failed - Dexa on file within past 2 years    Please review last Dexa result.          Failed - Normal serum creatinine on file within past 12 months    Recent Labs   Lab Test 02/13/18  1255   CR 1.08*            Passed - Recent (12 mo) or future (30 days) visit within the authorizing provider's specialty    Patient had office visit in the last 12 months or has a visit in the next 30 days with authorizing provider or within the authorizing provider's specialty.  See \"Patient Info\" tab in inbasket, or \"Choose Columns\" in Meds & Orders section of the refill encounter.             Passed - Patient is age 18 or older          Routing refill request to provider for review/approval because:  Labs out of range:  CR  Labs not current:  Dexa Scan    Last order of DX HIP/PELVIS/SPINE was found on 4/8/2015 from Hospital Encounter on 4/8/2015     Dora Waggoner RN     "

## 2018-12-18 NOTE — TELEPHONE ENCOUNTER
I have faxed the script to [facility]. Christal Brown CMA ([facility])

## 2018-12-21 DIAGNOSIS — E11.65 TYPE 2 DIABETES MELLITUS WITH HYPERGLYCEMIA, WITHOUT LONG-TERM CURRENT USE OF INSULIN (H): ICD-10-CM

## 2018-12-24 NOTE — TELEPHONE ENCOUNTER
Routing refill request to provider for review/approval because:  Labs out of range:  BP, Creatinine  Labs not current:  Potassium    MAX Silverio, RN  [facility]

## 2018-12-24 NOTE — TELEPHONE ENCOUNTER
"Requested Prescriptions   Pending Prescriptions Disp Refills     lisinopril (PRINIVIL/ZESTRIL) 10 MG tablet [Pharmacy Med Name: LISINOPRIL 10MG TABS] 90 tablet 0    Last Written Prescription Date:  9/24/18  Last Fill Quantity: 90,  # refills: 0   Last office visit: 11/8/2017 with prescribing provider:  5/31/18   Future Office Visit:     Sig: TAKE ONE TABLET BY MOUTH EVERY DAY    ACE Inhibitors (Including Combos) Protocol Failed - 12/21/2018  8:25 AM       Failed - Blood pressure under 140/90 in past 12 months    BP Readings from Last 3 Encounters:   06/22/18 143/83   05/31/18 102/62   12/12/17 110/65                Failed - Recent (12 mo) or future (30 days) visit within the authorizing provider's specialty    Patient had office visit in the last 12 months or has a visit in the next 30 days with authorizing provider or within the authorizing provider's specialty.  See \"Patient Info\" tab in inbasket, or \"Choose Columns\" in Meds & Orders section of the refill encounter.             Failed - Normal serum creatinine on file in past 12 months    Recent Labs   Lab Test 02/13/18  1255   CR 1.08*            Failed - Normal serum potassium on file in past 12 months    Recent Labs   Lab Test 10/29/16  2102   POTASSIUM 4.3            Passed - Patient is age 18 or older       Passed - No active pregnancy on record       Passed - No positive pregnancy test in past 12 months        "

## 2018-12-26 RX ORDER — LISINOPRIL 10 MG/1
TABLET ORAL
Qty: 90 TABLET | Refills: 0 | Status: SHIPPED | OUTPATIENT
Start: 2018-12-26 | End: 2019-03-20

## 2018-12-31 DIAGNOSIS — E11.65 TYPE 2 DIABETES MELLITUS WITH HYPERGLYCEMIA, WITHOUT LONG-TERM CURRENT USE OF INSULIN (H): ICD-10-CM

## 2019-01-02 RX ORDER — METFORMIN HCL 500 MG
TABLET, EXTENDED RELEASE 24 HR ORAL
Qty: 90 TABLET | Refills: 0 | Status: SHIPPED | OUTPATIENT
Start: 2019-01-02 | End: 2019-02-05

## 2019-01-02 NOTE — TELEPHONE ENCOUNTER
Routing refill request to provider for review/approval because:  Labs out of range:  BP, GFR, Creatinine  Labs not current:  A1C    MAX Silverio, RN  [facility]

## 2019-01-02 NOTE — TELEPHONE ENCOUNTER
"Requested Prescriptions   Pending Prescriptions Disp Refills     metFORMIN (GLUCOPHAGE-XR) 500 MG 24 hr tablet [Pharmacy Med Name: METFORMIN HCL ER 500MG TB24] 90 tablet 3    Last Written Prescription Date:  9/4/18  Last Fill Quantity: 90,  # refills: 3   Last office visit: 5/31/2018 with prescribing provider:  5/31/18   Future Office Visit:     Sig: TAKE 3 TABLETS ( 1500 MG) BY MOUTH EVERY DAY WITH DINNER    Biguanide Agents Failed - 12/31/2018  4:44 PM       Failed - Blood pressure less than 140/90 in past 6 months    BP Readings from Last 3 Encounters:   06/22/18 143/83   05/31/18 102/62   12/12/17 110/65                Failed - Patient has documented A1c within the specified period of time.    If HgbA1C is 8 or greater, it needs to be on file within the past 3 months.  If less than 8, must be on file within the past 6 months.     Recent Labs   Lab Test 02/13/18  1255   A1C 7.9*            Failed - Recent (6 mo) or future (30 days) visit within the authorizing provider's specialty    Patient had office visit in the last 6 months or has a visit in the next 30 days with authorizing provider or within the authorizing provider's specialty.  See \"Patient Info\" tab in inbasket, or \"Choose Columns\" in Meds & Orders section of the refill encounter.           Passed - Patient has documented LDL within the past 12 mos.    Recent Labs   Lab Test 05/31/18  0854   LDL 55            Passed - Patient has had a Microalbumin in the past 15 mos.    Recent Labs   Lab Test 02/13/18  1306   MICROL <20   UMALCR 6.19            Passed - Patient is age 10 or older       Passed - Patient's CR is NOT>1.4 OR Patient's EGFR is NOT<45 within past 12 mos.    Recent Labs   Lab Test 02/13/18  1255   GFRESTIMATED 50*   GFRESTBLACK 61       Recent Labs   Lab Test 02/13/18  1255   CR 1.08*            Passed - Patient does NOT have a diagnosis of CHF.       Passed - Patient is not pregnant       Passed - Patient has not had a positive pregnancy " test within the past 12 mos.

## 2019-02-05 ENCOUNTER — TELEPHONE (OUTPATIENT)
Dept: INTERNAL MEDICINE | Facility: CLINIC | Age: 69
End: 2019-02-05

## 2019-02-05 DIAGNOSIS — E11.65 TYPE 2 DIABETES MELLITUS WITH HYPERGLYCEMIA, WITHOUT LONG-TERM CURRENT USE OF INSULIN (H): ICD-10-CM

## 2019-02-05 RX ORDER — METFORMIN HCL 500 MG
TABLET, EXTENDED RELEASE 24 HR ORAL
Qty: 90 TABLET | Refills: 0 | Status: SHIPPED | OUTPATIENT
Start: 2019-02-05 | End: 2019-10-16

## 2019-02-05 NOTE — TELEPHONE ENCOUNTER
Reason for Call:  Medication or medication refill:    Do you use a [facility]?  Name of the pharmacy and phone number for the current request:  [facility] - [phone]    Name of the medication requested: metformin    Other request: patient only has 1 day left. Had an appt for tomorrow however she broke her hip some time ago and is nervous about the slippery roads/sidewalks tomorrow.  Rescheduled appt for next week.    Can we leave a detailed message on this number? YES    Phone number patient can be reached at: Home number on file [phone] (home)    Best Time:     Call taken on 2/5/2019 at 8:50 AM by Yaima Velez

## 2019-02-06 RX ORDER — METFORMIN HCL 500 MG
TABLET, EXTENDED RELEASE 24 HR ORAL
Qty: 90 TABLET | Refills: 0 | Status: SHIPPED | OUTPATIENT
Start: 2019-02-06 | End: 2019-04-01

## 2019-02-06 NOTE — TELEPHONE ENCOUNTER
Requested Prescriptions   Pending Prescriptions Disp Refills     metFORMIN (GLUCOPHAGE-XR) 500 MG 24 hr tablet [Pharmacy Med Name: METFORMIN HCL ER 500MG TB24] 90 tablet 0    Last Written Prescription Date:  2/5/19  Last Fill Quantity: 90,  # refills: 0   Last office visit: 5/31/2018 with prescribing provider:     Future Office Visit:   Next 5 appointments (look out 90 days)    Feb 12, 2019  1:00 PM CST  [facility] [address] [facility] [address] [phone]          Sig: TAKE THREE TABLETS BY MOUTH ONCE DAILY WITH DINNER. OFFICE VISIT NEEDED FOR FURTHER REFILLS.    Biguanide Agents Failed - 2/5/2019  9:41 AM       Failed - Blood pressure less than 140/90 in past 6 months    BP Readings from Last 3 Encounters:   06/22/18 143/83   05/31/18 102/62   12/12/17 110/65          Failed - Patient has documented A1c within the specified period of time.    If HgbA1C is 8 or greater, it needs to be on file within the past 3 months.  If less than 8, must be on file within the past 6 months.     Recent Labs   Lab Test 02/13/18  1255   A1C 7.9*          Passed - Patient has documented LDL within the past 12 mos.    Recent Labs   Lab Test 05/31/18  0854   LDL 55          Passed - Patient has had a Microalbumin in the past 15 mos.    Recent Labs   Lab Test 02/13/18  1306   MICROL <20   UMALCR 6.19          Passed - Patient is age 10 or older       Passed - Patient's CR is NOT>1.4 OR Patient's EGFR is NOT<45 within past 12 mos.    Recent Labs   Lab Test 02/13/18  1255   GFRESTIMATED 50*   GFRESTBLACK 61     Recent Labs   Lab Test 02/13/18  1255   CR 1.08*          Passed - Patient does NOT have a diagnosis of CHF.       Passed - Medication is active on med list       Passed - Patient is not pregnant       Passed - Patient has not had a positive pregnancy test within the past 12 mos.        Passed - Recent (6 mo) or future (30 days) visit  "within the authorizing provider's specialty    Patient had office visit in the last 6 months or has a visit in the next 30 days with authorizing provider or within the authorizing provider's specialty.  See \"Patient Info\" tab in inbasket, or \"Choose Columns\" in Meds & Orders section of the refill encounter.          Routing refill request to provider for review/approval because:  Labs not current:  A1C, B/P    T'd up 1 month for provider review.    Mayda Molina RN              "

## 2019-02-08 ENCOUNTER — TELEPHONE (OUTPATIENT)
Dept: INTERNAL MEDICINE | Facility: CLINIC | Age: 69
End: 2019-02-08

## 2019-02-08 NOTE — TELEPHONE ENCOUNTER
"Reason for Call:  Patient update    Detailed comments: Patient calling and states her  had to call 911 for her last night because she was \"talking funny\" and confused. Emergency crew came to home and checked pt out and she was OK. Patient was advised to report to primary physician. Patient would like Dr Elizondo or his nurse to call her to discuss if she should be seen. Pt does have appt with Dr Elizondo on 2.12 for physical.     Phone Number Patient can be reached at: Home number on file [phone] (home)    Best Time: any    Can we leave a detailed message on this number? YES    Call taken on 2/8/2019 at 8:46 AM by Natalia Ruiz    "

## 2019-02-08 NOTE — TELEPHONE ENCOUNTER
"Mera Valdivia is a 68 year old female   NURSING ASSESSMENT:  Description:  Pt calls today to let provider know that last night her  called 911 because he stated she was \"talking funny\". I spoke with  who is with pt and he says that she was talking in complete sentences, not slurring or garbled, but not making sense. What she was talking about was irrelevant to the situation. DENIES facial droop, slurring of words or arm drop.  reports that the  had him look for all these things while help was enroute.  also noted that pt was shivering and appeared to be really cold. By the time the ambulance arrived (~ 10-15 minutes) symptoms had subsided. Pt was checked by EMS and cleared.   Pt does report a hx of TIA's in the past and has been taking aspirin daily since. Currently pt is speaking fine. She does state that she remembers the situation. She slept well, and reports no symptoms. Pt has an appt with provider next Tues 2/1219. EMS advised pt to contact clinic and update provider of what happened. Advised to keep appt next week. If symptoms occur again before then call 911. Will route message to provider.     Pain scale (0-10)   0/10  Last exam/Treatment:  5/31/2018  Allergies:   Allergies   Allergen Reactions     Nitrous Oxide      Doesn't recall any reaction to this drug     If further questions/concerns or if symptoms do not improve, worsen or new symptoms develop, call your PCP or [facility] as soon as possible.    Acacia Coronado, RN, BSN    "

## 2019-02-08 NOTE — TELEPHONE ENCOUNTER
Events and resolution noted.  If recurs, go to ER ASAP.  Otherwise, follow-up in clinic as scheduled.    Thank you.    Caro

## 2019-02-12 ENCOUNTER — OFFICE VISIT (OUTPATIENT)
Dept: FAMILY MEDICINE | Facility: OTHER | Age: 69
End: 2019-02-12
Payer: COMMERCIAL

## 2019-02-12 VITALS
DIASTOLIC BLOOD PRESSURE: 72 MMHG | TEMPERATURE: 97.8 F | OXYGEN SATURATION: 99 % | RESPIRATION RATE: 16 BRPM | SYSTOLIC BLOOD PRESSURE: 118 MMHG | WEIGHT: 182.2 LBS | BODY MASS INDEX: 31.1 KG/M2 | HEART RATE: 102 BPM | HEIGHT: 64 IN

## 2019-02-12 DIAGNOSIS — G45.8 ACUTE ANTERIOR CIRCULATION TRANSIENT ISCHEMIC ATTACK: ICD-10-CM

## 2019-02-12 DIAGNOSIS — Z00.00 ENCOUNTER FOR ROUTINE ADULT HEALTH EXAMINATION WITHOUT ABNORMAL FINDINGS: ICD-10-CM

## 2019-02-12 DIAGNOSIS — I10 BENIGN ESSENTIAL HYPERTENSION: Primary | ICD-10-CM

## 2019-02-12 DIAGNOSIS — E78.5 HYPERLIPIDEMIA LDL GOAL <100: ICD-10-CM

## 2019-02-12 DIAGNOSIS — E11.65 TYPE 2 DIABETES MELLITUS WITH HYPERGLYCEMIA, WITHOUT LONG-TERM CURRENT USE OF INSULIN (H): ICD-10-CM

## 2019-02-12 LAB
ALBUMIN SERPL-MCNC: 3.7 G/DL (ref 3.4–5)
ALP SERPL-CCNC: 83 U/L (ref 40–150)
ALT SERPL W P-5'-P-CCNC: 37 U/L (ref 0–50)
ANION GAP SERPL CALCULATED.3IONS-SCNC: 9 MMOL/L (ref 3–14)
AST SERPL W P-5'-P-CCNC: 26 U/L (ref 0–45)
BILIRUB SERPL-MCNC: 0.7 MG/DL (ref 0.2–1.3)
BUN SERPL-MCNC: 15 MG/DL (ref 7–30)
CALCIUM SERPL-MCNC: 9.1 MG/DL (ref 8.5–10.1)
CHLORIDE SERPL-SCNC: 102 MMOL/L (ref 94–109)
CHOLEST SERPL-MCNC: 146 MG/DL
CO2 SERPL-SCNC: 26 MMOL/L (ref 20–32)
CREAT SERPL-MCNC: 1.12 MG/DL (ref 0.52–1.04)
CREAT UR-MCNC: 230 MG/DL
GFR SERPL CREATININE-BSD FRML MDRD: 50 ML/MIN/{1.73_M2}
GLUCOSE SERPL-MCNC: 172 MG/DL (ref 70–99)
HBA1C MFR BLD: 7.7 % (ref 0–5.6)
HDLC SERPL-MCNC: 44 MG/DL
HGB BLD-MCNC: 13.3 G/DL (ref 11.7–15.7)
LDLC SERPL CALC-MCNC: 65 MG/DL
MICROALBUMIN UR-MCNC: 27 MG/L
MICROALBUMIN/CREAT UR: 11.74 MG/G CR (ref 0–25)
NONHDLC SERPL-MCNC: 102 MG/DL
POTASSIUM SERPL-SCNC: 4.1 MMOL/L (ref 3.4–5.3)
PROT SERPL-MCNC: 7.4 G/DL (ref 6.8–8.8)
SODIUM SERPL-SCNC: 137 MMOL/L (ref 133–144)
TRIGL SERPL-MCNC: 183 MG/DL

## 2019-02-12 PROCEDURE — 82043 UR ALBUMIN QUANTITATIVE: CPT | Performed by: INTERNAL MEDICINE

## 2019-02-12 PROCEDURE — 80053 COMPREHEN METABOLIC PANEL: CPT | Performed by: INTERNAL MEDICINE

## 2019-02-12 PROCEDURE — 36415 COLL VENOUS BLD VENIPUNCTURE: CPT | Performed by: INTERNAL MEDICINE

## 2019-02-12 PROCEDURE — 99397 PER PM REEVAL EST PAT 65+ YR: CPT | Performed by: INTERNAL MEDICINE

## 2019-02-12 PROCEDURE — 80061 LIPID PANEL: CPT | Performed by: INTERNAL MEDICINE

## 2019-02-12 PROCEDURE — 85018 HEMOGLOBIN: CPT | Performed by: INTERNAL MEDICINE

## 2019-02-12 PROCEDURE — 83036 HEMOGLOBIN GLYCOSYLATED A1C: CPT | Performed by: INTERNAL MEDICINE

## 2019-02-12 RX ORDER — SIMVASTATIN 10 MG
10 TABLET ORAL AT BEDTIME
Qty: 30 TABLET | Refills: 7 | Status: SHIPPED | OUTPATIENT
Start: 2019-02-12 | End: 2019-10-10

## 2019-02-12 ASSESSMENT — ENCOUNTER SYMPTOMS
NERVOUS/ANXIOUS: 0
PALPITATIONS: 0
COUGH: 0
DYSURIA: 0
ARTHRALGIAS: 0
DIARRHEA: 0
EYE PAIN: 0
JOINT SWELLING: 0
SHORTNESS OF BREATH: 0
MYALGIAS: 0
FREQUENCY: 0
SORE THROAT: 0
PARESTHESIAS: 0
HEARTBURN: 0
FEVER: 0
WEAKNESS: 0
HEMATOCHEZIA: 0
NAUSEA: 0
CONSTIPATION: 0
DIZZINESS: 0
CHILLS: 0
ABDOMINAL PAIN: 0
HEMATURIA: 0

## 2019-02-12 ASSESSMENT — MIFFLIN-ST. JEOR: SCORE: 1336.45

## 2019-02-12 ASSESSMENT — PAIN SCALES - GENERAL: PAINLEVEL: NO PAIN (0)

## 2019-02-12 ASSESSMENT — ACTIVITIES OF DAILY LIVING (ADL): CURRENT_FUNCTION: NO ASSISTANCE NEEDED

## 2019-02-12 NOTE — PROGRESS NOTES
"SUBJECTIVE:   The patient is a pleasant 68-year-old female who recently had an episode of confusion.  The EMS was called and by that time it had resolved.  During this brief period, she was using words inappropriately and has no memory of the situation.  She does have a previous history of TIA and is appropriately on aspirin therapy.  She has a history of diabetes and her blood sugars have been well controlled.  Previous CTA of the neck and head a couple years ago was entirely unremarkable.  It was performed at that time for similar symptoms.    Annual Wellness Visit     In general, how would you rate your overall health?  Fair    Frequency of exercise:  1 day/week    Do you usually eat at least 4 servings of fruit and vegetables a day, include whole grains    & fiber and avoid regularly eating high fat or \"junk\" foods?  No    Taking medications regularly:  Yes    Medication side effects:  None    Ability to successfully perform activities of daily living:  No assistance needed    Home Safety:  No safety concerns identified    Hearing Impairment:  No hearing concerns    In the past 6 months, have you been bothered by leaking of urine?  No    In general, how would you rate your overall mental or emotional health?  Excellent    PHQ-2 Total Score: 0    Additional concerns today:  No    Do you feel safe in your environment? Yes    Do you have a Health Care Directive? No: Advance care planning was reviewed with patient; patient declined at this time.      Fall risk  Fallen 2 or more times in the past year?: No  Any fall with injury in the past year?: Yes    Cognitive Screening   1) Repeat 3 items (Leader, Season, Table)    2) Clock draw: NORMAL  3) 3 item recall: Recalls 2 objects   Results: NORMAL clock, 1-2 items recalled: COGNITIVE IMPAIRMENT LESS LIKELY    Mini-CogTM Copyright JEN Patel. Licensed by the author for use in [facility]; reprinted with permission (lory@.[facility]). All rights reserved.      Do " you have sleep apnea, excessive snoring or daytime drowsiness?: no    Reviewed and updated as needed this visit by clinical staff         Reviewed and updated as needed this visit by Provider        Social History     Tobacco Use     Smoking status: Never Smoker     Smokeless tobacco: Never Used   Substance Use Topics     Alcohol use: No     Alcohol/week: 0.0 oz       Alcohol Use 2/12/2019   If you drink alcohol do you typically have greater than 3 drinks per day OR greater than 7 drinks per week? No   No flowsheet data found.        Discuss TIA    Current providers sharing in care for this patient include:   Patient Care Team:  William Elizondo DO as PCP - General (Internal Medicine)  William Elizondo DO as PCP - Assigned PCP    The following health maintenance items are reviewed in Epic and correct as of today:  Health Maintenance   Topic Date Due     ZOSTER IMMUNIZATION (2 of 3) 12/15/2014     BMP Q1 YR  10/29/2017     HEMOGLOBIN Q1 YR  10/29/2017     A1C Q6 MO  08/13/2018     EYE EXAM Q1 YEAR  10/25/2018     FALL RISK ASSESSMENT  11/08/2018     CREATININE Q1 YEAR  02/13/2019     MICROALBUMIN Q1 YEAR  02/13/2019     FOOT EXAM Q1 YEAR  05/31/2019     LIPID MONITORING Q1 YEAR  05/31/2019     PHQ-2 Q1 YR  05/31/2019     FIT Q1 YR  07/30/2019     TSH W/ FREE T4 REFLEX Q2 YEAR  05/31/2020     MAMMO SCREEN Q2 YR (SYSTEM ASSIGNED)  09/06/2020     ADVANCE DIRECTIVE PLANNING Q5 YRS  11/08/2022     DTAP/TDAP/TD IMMUNIZATION (2 - Td) 02/19/2025     DEXA SCAN SCREENING (SYSTEM ASSIGNED)  Completed     INFLUENZA VACCINE  Completed     HEPATITIS C SCREENING  Completed     IPV IMMUNIZATION  Aged Out     MENINGITIS IMMUNIZATION  Aged Out     Labs reviewed in EPIC  BP Readings from Last 3 Encounters:   02/12/19 118/72   06/22/18 143/83   05/31/18 102/62    Wt Readings from Last 3 Encounters:   02/12/19 82.6 kg (182 lb 3.2 oz)   06/22/18 82.1 kg (181 lb)   05/31/18 82.5 kg (181 lb 12.8 oz)                   Patient Active Problem List   Diagnosis     Advanced directives, counseling/discussion     Elevated triglycerides with high cholesterol     Hyperlipidemia LDL goal <100     Endometrial cells on cervical Pap smear inconsistent w/LMP     Benign essential hypertension     Acute anterior circulation transient ischemic attack     CKD (chronic kidney disease) stage 3, GFR 30-59 ml/min (H)     Type 2 diabetes mellitus with hyperglycemia, without long-term current use of insulin (H)     Osteopenia of multiple sites     Closed nondisplaced intertrochanteric fracture of right femur with routine healing, subsequent encounter     Past Surgical History:   Procedure Laterality Date     DILATION AND CURETTAGE, OPERATIVE HYSTEROSCOPY, COMBINED N/A 2/16/2016    Procedure: COMBINED DILATION AND CURETTAGE, OPERATIVE HYSTEROSCOPY;  Surgeon: Jenae Walden MD;  Location: [facility] OR     HC FLEX SIGMOIDOSCOPY W/WO DELILAH SPEC BY BRUSH/WASH  1997     HC REVISE MEDIAN N/CARPAL TUNNEL SURG  1985?       Social History     Tobacco Use     Smoking status: Never Smoker     Smokeless tobacco: Never Used   Substance Use Topics     Alcohol use: Yes     Alcohol/week: 0.0 oz     Comment: once in a while     Family History   Problem Relation Age of Onset     Breast Cancer Sister      Cancer - colorectal Mother      Cardiovascular Father         QUADRUPLE BYPASS IN 1992         Current Outpatient Medications   Medication Sig Dispense Refill     alendronate (FOSAMAX) 70 MG tablet Take 1 tablet (70 mg) by mouth every 7 days Take with over 8 ounces water and stay upright for at least 30 minutes after dose.  Take at least 60 minutes before breakfast 4 tablet 1     amLODIPine (NORVASC) 5 MG tablet TAKE ONE TABLET BY MOUTH EVERY DAY 90 tablet 3     aspirin 81 MG EC tablet Take 1 tablet (81 mg) by mouth daily 90 tablet 3     calcium-vitamin D (CALTRATE) 600-400 MG-UNIT per tablet Take 1 tablet by mouth 2 times daily       lisinopril (PRINIVIL/ZESTRIL) 10  "MG tablet TAKE ONE TABLET BY MOUTH EVERY DAY 90 tablet 0     metFORMIN (GLUCOPHAGE-XR) 500 MG 24 hr tablet TAKE 3 TABLETS ( 1500 MG) BY MOUTH EVERY DAY WITH DINNER 90 tablet 0     simvastatin (ZOCOR) 10 MG tablet Take 1 tablet (10 mg) by mouth At Bedtime 30 tablet 7     acetaminophen (TYLENOL) 325 MG tablet Take 2 tablets (650 mg) by mouth every 4 hours as needed for mild pain 100 tablet      metFORMIN (GLUCOPHAGE-XR) 500 MG 24 hr tablet TAKE THREE TABLETS BY MOUTH ONCE DAILY WITH DINNER. OFFICE VISIT NEEDED FOR FURTHER REFILLS. 90 tablet 0     Allergies   Allergen Reactions     Nitrous Oxide      Doesn't recall any reaction to this drug     Mammogram being performed today    Review of Systems  CONSTITUTIONAL: NEGATIVE for fever, chills, change in weight  INTEGUMENTARY/SKIN: NEGATIVE for worrisome rashes, moles or lesions  EYES: NEGATIVE for vision changes or irritation  ENT/MOUTH: NEGATIVE for ear, mouth and throat problems  RESP: NEGATIVE for significant cough or SOB  BREAST: NEGATIVE for masses, tenderness or discharge  CV: NEGATIVE for chest pain, palpitations or peripheral edema  GI: NEGATIVE for nausea, abdominal pain, heartburn, or change in bowel habits  : NEGATIVE for frequency, dysuria, or hematuria  MUSCULOSKELETAL: NEGATIVE for significant arthralgias or myalgia  NEURO: NEGATIVE for weakness, dizziness or paresthesias.  Patient did have the recent symptoms as noted above.  ENDOCRINE: NEGATIVE for temperature intolerance, skin/hair changes  HEME: NEGATIVE for bleeding problems  PSYCHIATRIC: NEGATIVE for changes in mood or affect    OBJECTIVE:   There were no vitals taken for this visit. Estimated body mass index is 30.59 kg/m  as calculated from the following:    Height as of 6/22/18: 1.638 m (5' 4.5\").    Weight as of 6/22/18: 82.1 kg (181 lb).  Physical Exam  GENERAL APPEARANCE: healthy, alert and no distress  EYES: Eyes grossly normal to inspection, PERRL and conjunctivae and sclerae normal  HENT: " ear canals and TM's normal, nose and mouth without ulcers or lesions, oropharynx clear and oral mucous membranes moist  NECK: no adenopathy, no asymmetry, masses, or scars and thyroid normal to palpation  RESP: lungs clear to auscultation - no rales, rhonchi or wheezes  CV: regular rate and rhythm, normal S1 S2, no S3 or S4, no murmur, click or rub, no peripheral edema and peripheral pulses strong  ABDOMEN: soft, nontender, no hepatosplenomegaly, no masses and bowel sounds normal  MS: no musculoskeletal defects are noted and gait is age appropriate without ataxia  SKIN: no suspicious lesions or rashes  NEURO: Normal strength and tone, sensory exam grossly normal, mentation intact and speech normal  PSYCH: mentation appears normal and affect normal/bright    Diagnostic Test Results:  No results found for this or any previous visit (from the past 24 hour(s)).    ASSESSMENT / PLAN:       ICD-10-CM    1. Benign essential hypertension I10    2. Encounter for routine adult health examination without abnormal findings Z00.00    3. Acute anterior circulation transient ischemic attack G45.8 Hemoglobin   4. Type 2 diabetes mellitus with hyperglycemia, without long-term current use of insulin (H) E11.65 Comprehensive metabolic panel (BMP + Alb, Alk Phos, ALT, AST, Total. Bili, TP)     Albumin Random Urine Quantitative with Creat Ratio     Hemoglobin A1c     simvastatin (ZOCOR) 10 MG tablet     Lipid panel reflex to direct LDL Fasting   5. Hyperlipidemia LDL goal <100 E78.5 simvastatin (ZOCOR) 10 MG tablet     Lipid panel reflex to direct LDL Fasting       End of Life Planning:  Patient currently has an advanced directive: Yes.  Practitioner is supportive of decision.    COUNSELING:  Reviewed preventive health counseling, as reflected in patient instructions       Regular exercise       Healthy diet/nutrition       Vision screening       Hearing screening       Dental care       Bladder control       Aspirin Prophylaxsis        "Colon cancer screening    BP Readings from Last 1 Encounters:   06/22/18 143/83     Estimated body mass index is 30.59 kg/m  as calculated from the following:    Height as of 6/22/18: 1.638 m (5' 4.5\").    Weight as of 6/22/18: 82.1 kg (181 lb).           reports that  has never smoked. she has never used smokeless tobacco.      Appropriate preventive services were discussed with this patient, including applicable screening as appropriate for cardiovascular disease, diabetes, osteopenia/osteoporosis, and glaucoma.  As appropriate for age/gender, discussed screening for colorectal cancer, prostate cancer, breast cancer, and cervical cancer. Checklist reviewing preventive services available has been given to the patient.    Reviewed patients plan of care and provided an AVS. The Basic Care Plan (routine screening as documented in Health Maintenance) for Mera meets the Care Plan requirement. This Care Plan has been established and reviewed with the Patient and spouse.    Counseling Resources:  ATP IV Guidelines  Pooled Cohorts Equation Calculator  Breast Cancer Risk Calculator  FRAX Risk Assessment  ICSI Preventive Guidelines  Dietary Guidelines for Americans, 2010  USDA's MyPlate  ASA Prophylaxis  Lung CA Screening    William Elizondo,   [facility]  "

## 2019-02-12 NOTE — LETTER
February 20, 2019      Mera Valdivia  [address] [address] [id number]        Dear ,    We are writing to inform you of your test results.    The blood sugar slightly elevated 172 and the kidney function is slightly decreased but stable over the last year.   Liver tests are normal.   The cholesterol is well controlled with an LDL 65.   No anemia is noted.   Hemoglobin A1c is 7.7 suggesting slight improvement in blood sugar management.   The microalbumin is normal.   Feel free to contact me via the office or My Chart if you have any questions regarding the above.     Resulted Orders   Comprehensive metabolic panel (BMP + Alb, Alk Phos, ALT, AST, Total. Bili, TP)   Result Value Ref Range    Sodium 137 133 - 144 mmol/L    Potassium 4.1 3.4 - 5.3 mmol/L    Chloride 102 94 - 109 mmol/L    Carbon Dioxide 26 20 - 32 mmol/L    Anion Gap 9 3 - 14 mmol/L    Glucose 172 (H) 70 - 99 mg/dL    Urea Nitrogen 15 7 - 30 mg/dL    Creatinine 1.12 (H) 0.52 - 1.04 mg/dL    GFR Estimate 50 (L) >60 mL/min/[1.73_m2]      Comment:      Non  GFR Calc  Starting 12/18/2018, serum creatinine based estimated GFR (eGFR) will be   calculated using the Chronic Kidney Disease Epidemiology Collaboration   (CKD-EPI) equation.      GFR Estimate If Black 58 (L) >60 mL/min/[1.73_m2]      Comment:       GFR Calc  Starting 12/18/2018, serum creatinine based estimated GFR (eGFR) will be   calculated using the Chronic Kidney Disease Epidemiology Collaboration   (CKD-EPI) equation.      Calcium 9.1 8.5 - 10.1 mg/dL    Bilirubin Total 0.7 0.2 - 1.3 mg/dL    Albumin 3.7 3.4 - 5.0 g/dL    Protein Total 7.4 6.8 - 8.8 g/dL    Alkaline Phosphatase 83 40 - 150 U/L    ALT 37 0 - 50 U/L    AST 26 0 - 45 U/L   Albumin Random Urine Quantitative with Creat Ratio   Result Value Ref Range    Creatinine Urine 230 mg/dL    Albumin Urine mg/L 27 mg/L    Albumin Urine mg/g Cr 11.74 0 - 25 mg/g Cr   Hemoglobin A1c   Result  Value Ref Range    Hemoglobin A1C 7.7 (H) 0 - 5.6 %      Comment:      Normal <5.7% Prediabetes 5.7-6.4%  Diabetes 6.5% or higher - adopted from ADA   consensus guidelines.     Hemoglobin   Result Value Ref Range    Hemoglobin 13.3 11.7 - 15.7 g/dL   Lipid panel reflex to direct LDL Fasting   Result Value Ref Range    Cholesterol 146 <200 mg/dL    Triglycerides 183 (H) <150 mg/dL      Comment:      Borderline high:  150-199 mg/dl  High:             200-499 mg/dl  Very high:       >499 mg/dl      HDL Cholesterol 44 (L) >49 mg/dL    LDL Cholesterol Calculated 65 <100 mg/dL      Comment:      Desirable:       <100 mg/dl    Non HDL Cholesterol 102 <130 mg/dL       If you have any questions or concerns, please call the clinic at the number listed above.       Sincerely,        William Elizondo DO

## 2019-02-18 ENCOUNTER — TELEPHONE (OUTPATIENT)
Dept: INTERNAL MEDICINE | Facility: CLINIC | Age: 69
End: 2019-02-18

## 2019-02-18 DIAGNOSIS — M85.89 OSTEOPENIA OF MULTIPLE SITES: ICD-10-CM

## 2019-02-19 RX ORDER — ALENDRONATE SODIUM 70 MG/1
70 TABLET ORAL
Qty: 4 TABLET | Refills: 3 | Status: SHIPPED | OUTPATIENT
Start: 2019-02-19 | End: 2019-06-12

## 2019-02-19 NOTE — TELEPHONE ENCOUNTER
Routing refill request to provider for review/approval because:  Labs out of range:  Creatinine  Labs not current:  DEXA scan    MAX Silverio, RN  [facility]

## 2019-02-19 NOTE — TELEPHONE ENCOUNTER
"Requested Prescriptions   Pending Prescriptions Disp Refills     alendronate (FOSAMAX) 70 MG tablet 4 tablet 1    Last Written Prescription Date:  12/17/18  Last Fill Quantity: 4,  # refills: 1   Last office visit: 5/31/2018 with prescribing provider:  2/12/19   Future Office Visit:     Sig: Take 1 tablet (70 mg) by mouth every 7 days Take with over 8 ounces water and stay upright for at least 30 minutes after dose.  Take at least 60 minutes before breakfast    Bisphosphonates Failed - 2/18/2019  9:16 AM       Failed - Dexa on file within past 2 years    Please review last Dexa result.          Failed - Normal serum creatinine on file within past 12 months    Recent Labs   Lab Test 02/12/19  1348   CR 1.12*            Passed - Recent (12 mo) or future (30 days) visit within the authorizing provider's specialty    Patient had office visit in the last 12 months or has a visit in the next 30 days with authorizing provider or within the authorizing provider's specialty.  See \"Patient Info\" tab in inbasket, or \"Choose Columns\" in Meds & Orders section of the refill encounter.             Passed - Medication is active on med list       Passed - Patient is age 18 or older        "

## 2019-02-20 NOTE — TELEPHONE ENCOUNTER
I called and gave a verbal for this prescription as he did it yesterday. Sofi Oh MA     2/20/2019

## 2019-02-20 NOTE — RESULT ENCOUNTER NOTE
Dear Mera, your recent test results are attached.    The blood sugar slightly elevated 172 and the kidney function is slightly decreased but stable over the last year.  Liver tests are normal.  The cholesterol is well controlled with an LDL 65.  No anemia is noted.  Hemoglobin A1c is 7.7 suggesting slight improvement in blood sugar management.  The microalbumin is normal.  Feel free to contact me via the office or My Chart if you have any questions regarding the above.

## 2019-03-15 DIAGNOSIS — E78.5 HYPERLIPIDEMIA LDL GOAL <100: ICD-10-CM

## 2019-03-15 RX ORDER — AMLODIPINE BESYLATE 5 MG/1
TABLET ORAL
Qty: 90 TABLET | Refills: 3 | Status: SHIPPED | OUTPATIENT
Start: 2019-03-15 | End: 2020-02-20

## 2019-03-15 NOTE — TELEPHONE ENCOUNTER
Routing refill request to provider for review/approval because:  Labs out of range:  Creatinine    DENNIS SilverioN, RN  [facility]

## 2019-03-15 NOTE — TELEPHONE ENCOUNTER
"Requested Prescriptions   Pending Prescriptions Disp Refills     amLODIPine (NORVASC) 5 MG tablet [Pharmacy Med Name: AMLODIPINE BESYLATE 5MG TABS] 90 tablet 3    Last Written Prescription Date:  3/7/18  Last Fill Quantity: 90,  # refills: 3   Last office visit: 5/31/2018 with prescribing provider:  2/12/19   Future Office Visit:     Sig: TAKE ONE TABLET BY MOUTH EVERY DAY    Calcium Channel Blockers Protocol  Failed - 3/15/2019  5:02 AM       Failed - Normal serum creatinine on file in past 12 months    Recent Labs   Lab Test 02/12/19  1348   CR 1.12*            Passed - Blood pressure under 140/90 in past 12 months    BP Readings from Last 3 Encounters:   02/12/19 118/72   06/22/18 143/83   05/31/18 102/62                Passed - Recent (12 mo) or future (30 days) visit within the authorizing provider's specialty    Patient had office visit in the last 12 months or has a visit in the next 30 days with authorizing provider or within the authorizing provider's specialty.  See \"Patient Info\" tab in inbasket, or \"Choose Columns\" in Meds & Orders section of the refill encounter.             Passed - Medication is active on med list       Passed - Patient is age 18 or older       Passed - No active pregnancy on record       Passed - No positive pregnancy test in past 12 months        "

## 2019-03-20 DIAGNOSIS — E11.65 TYPE 2 DIABETES MELLITUS WITH HYPERGLYCEMIA, WITHOUT LONG-TERM CURRENT USE OF INSULIN (H): ICD-10-CM

## 2019-03-22 RX ORDER — LISINOPRIL 10 MG/1
TABLET ORAL
Qty: 90 TABLET | Refills: 0 | Status: SHIPPED | OUTPATIENT
Start: 2019-03-22 | End: 2019-06-19

## 2019-03-22 NOTE — TELEPHONE ENCOUNTER
Routing refill request to provider for review/approval because:  Labs out of range:  Creatinine    DENNIS SilverioN, RN  [facility]

## 2019-03-22 NOTE — TELEPHONE ENCOUNTER
"Requested Prescriptions   Pending Prescriptions Disp Refills     lisinopril (PRINIVIL/ZESTRIL) 10 MG tablet [Pharmacy Med Name: LISINOPRIL 10MG TABS] 90 tablet 0    Last Written Prescription Date:  12/26/18  Last Fill Quantity: 90,  # refills: 0   Last office visit: 2/12/2019 with prescribing provider:  2/12/19   Future Office Visit:     Sig: TAKE ONE TABLET BY MOUTH ONCE DAILY    ACE Inhibitors (Including Combos) Protocol Failed - 3/20/2019  5:02 AM       Failed - Normal serum creatinine on file in past 12 months    Recent Labs   Lab Test 02/12/19  1348   CR 1.12*            Passed - Blood pressure under 140/90 in past 12 months    BP Readings from Last 3 Encounters:   02/12/19 118/72   06/22/18 143/83   05/31/18 102/62                Passed - Recent (12 mo) or future (30 days) visit within the authorizing provider's specialty    Patient had office visit in the last 12 months or has a visit in the next 30 days with authorizing provider or within the authorizing provider's specialty.  See \"Patient Info\" tab in inbasket, or \"Choose Columns\" in Meds & Orders section of the refill encounter.             Passed - Medication is active on med list       Passed - Patient is age 18 or older       Passed - No active pregnancy on record       Passed - Normal serum potassium on file in past 12 months    Recent Labs   Lab Test 02/12/19  1348   POTASSIUM 4.1            Passed - No positive pregnancy test within past 12 months        "

## 2019-04-01 DIAGNOSIS — E11.65 TYPE 2 DIABETES MELLITUS WITH HYPERGLYCEMIA, WITHOUT LONG-TERM CURRENT USE OF INSULIN (H): ICD-10-CM

## 2019-04-02 RX ORDER — METFORMIN HCL 500 MG
1500 TABLET, EXTENDED RELEASE 24 HR ORAL
Qty: 90 TABLET | Refills: 5 | Status: SHIPPED | OUTPATIENT
Start: 2019-04-02 | End: 2019-09-27

## 2019-04-02 NOTE — TELEPHONE ENCOUNTER
Routing refill request to provider for review/approval because:  Labs out of range:  Creatinine, GFR    MAX Silverio, RN  [facility]

## 2019-04-02 NOTE — TELEPHONE ENCOUNTER
Requested Prescriptions   Pending Prescriptions Disp Refills     metFORMIN (GLUCOPHAGE-XR) 500 MG 24 hr tablet [Pharmacy Med Name: METFORMIN HCL ER 500MG TB24] 90 tablet 0    Last Written Prescription Date:  2/6/19  Last Fill Quantity: 90,  # refills: 0   Last office visit: 5/31/2018 with prescribing provider:  2/12/19   Future Office Visit:     Sig: TAKE 3 TABLETS ( 1500 MG) BY MOUTH ONCE DAILY WITH DINNER. ( OFFICE VISIT NEEDED FOR FURTHER REFILLS)    Biguanide Agents Passed - 4/1/2019  5:02 AM       Passed - Blood pressure less than 140/90 in past 6 months    BP Readings from Last 3 Encounters:   02/12/19 118/72   06/22/18 143/83   05/31/18 102/62                Passed - Patient has documented LDL within the past 12 mos.    Recent Labs   Lab Test 02/12/19  1348   LDL 65            Passed - Patient has had a Microalbumin in the past 15 mos.    Recent Labs   Lab Test 02/12/19  1344   MICROL 27   UMALCR 11.74            Passed - Patient is age 10 or older       Passed - Patient has documented A1c within the specified period of time.    If HgbA1C is 8 or greater, it needs to be on file within the past 3 months.  If less than 8, must be on file within the past 6 months.     Recent Labs   Lab Test 02/12/19  1348   A1C 7.7*            Passed - Patient's CR is NOT>1.4 OR Patient's EGFR is NOT<45 within past 12 mos.    Recent Labs   Lab Test 02/12/19  1348   GFRESTIMATED 50*   GFRESTBLACK 58*       Recent Labs   Lab Test 02/12/19  1348   CR 1.12*            Passed - Patient does NOT have a diagnosis of CHF.       Passed - Medication is active on med list       Passed - Patient is not pregnant       Passed - Patient has not had a positive pregnancy test within the past 12 mos.        Passed - Recent (6 mo) or future (30 days) visit within the authorizing provider's specialty    Patient had office visit in the last 6 months or has a visit in the next 30 days with authorizing provider or within the authorizing provider's  "specialty.  See \"Patient Info\" tab in inbasket, or \"Choose Columns\" in Meds & Orders section of the refill encounter.            "

## 2019-06-11 DIAGNOSIS — M85.89 OSTEOPENIA OF MULTIPLE SITES: ICD-10-CM

## 2019-06-11 NOTE — TELEPHONE ENCOUNTER
"Requested Prescriptions   Pending Prescriptions Disp Refills     alendronate (FOSAMAX) 70 MG tablet  Last Written Prescription Date:  2/19/19  Last Fill Quantity: 4,  # refills: 3   Last office visit: 2/12/2019 with prescribing provider:  Caro   Future Office Visit:     4 tablet 3     Sig: Take 1 tablet (70 mg) by mouth every 7 days Take with over 8 ounces water and stay upright for at least 30 minutes after dose.  Take at least 60 minutes before breakfast       Bisphosphonates Failed - 6/11/2019  3:50 PM        Failed - Dexa on file within past 2 years     Please review last Dexa result.           Failed - Normal serum creatinine on file within past 12 months     Recent Labs   Lab Test 02/12/19  1348   CR 1.12*             Passed - Recent (12 mo) or future (30 days) visit within the authorizing provider's specialty     Patient had office visit in the last 12 months or has a visit in the next 30 days with authorizing provider or within the authorizing provider's specialty.  See \"Patient Info\" tab in inbasket, or \"Choose Columns\" in Meds & Orders section of the refill encounter.              Passed - Medication is active on med list        Passed - Patient is age 18 or older          "

## 2019-06-12 RX ORDER — ALENDRONATE SODIUM 70 MG/1
70 TABLET ORAL
Qty: 4 TABLET | Refills: 3 | Status: SHIPPED | OUTPATIENT
Start: 2019-06-12 | End: 2019-10-10

## 2019-06-12 NOTE — TELEPHONE ENCOUNTER
Routing to covering provider to review. PCP out of clinic.    Routing refill request to provider for review/approval because:  Labs out of range:  Wilber Coronado, RN, BSN

## 2019-06-19 DIAGNOSIS — E11.65 TYPE 2 DIABETES MELLITUS WITH HYPERGLYCEMIA, WITHOUT LONG-TERM CURRENT USE OF INSULIN (H): Primary | ICD-10-CM

## 2019-06-20 RX ORDER — LISINOPRIL 10 MG/1
TABLET ORAL
Qty: 90 TABLET | Refills: 0 | Status: SHIPPED | OUTPATIENT
Start: 2019-06-20 | End: 2019-09-16

## 2019-06-20 NOTE — TELEPHONE ENCOUNTER
"Lisinopril 10 mg  Last Written Prescription Date:  3.22/19  Last Fill Quantity: 90,  # refills: 0   Last office visit: 2/12/2019 with prescribing provider:   Was to Follow-up in 4 months.   Future Office Visit:  NONE  Requested Prescriptions   Pending Prescriptions Disp Refills     lisinopril (PRINIVIL/ZESTRIL) 10 MG tablet [Pharmacy Med Name: LISINOPRIL 10MG TABS] 90 tablet 0     Sig: TAKE ONE TABLET BY MOUTH ONCE DAILY       ACE Inhibitors (Including Combos) Protocol Failed - 6/19/2019  4:50 PM        Failed - Normal serum creatinine on file in past 12 months     Recent Labs   Lab Test 02/12/19  1348   CR 1.12*           Passed - Blood pressure under 140/90 in past 12 months     BP Readings from Last 3 Encounters:   02/12/19 118/72   06/22/18 143/83   05/31/18 102/62           Passed - Recent (12 mo) or future (30 days) visit within the authorizing provider's specialty     Patient had office visit in the last 12 months or has a visit in the next 30 days with authorizing provider or within the authorizing provider's specialty.  See \"Patient Info\" tab in inbasket, or \"Choose Columns\" in Meds & Orders section of the refill encounter.            Passed - Medication is active on med list        Passed - Patient is age 18 or older        Passed - No active pregnancy on record        Passed - Normal serum potassium on file in past 12 months     Recent Labs   Lab Test 02/12/19  1348   POTASSIUM 4.1           Passed - No positive pregnancy test within past 12 months      Prescription approved per [facility] Refill Protocol for a one time refill. PT is due for a Follow-up appt.  Notified per comment section of RX.   NANCI Mckeon        "

## 2019-08-28 ENCOUNTER — TRANSFERRED RECORDS (OUTPATIENT)
Dept: HEALTH INFORMATION MANAGEMENT | Facility: CLINIC | Age: 69
End: 2019-08-28

## 2019-09-16 DIAGNOSIS — E11.65 TYPE 2 DIABETES MELLITUS WITH HYPERGLYCEMIA, WITHOUT LONG-TERM CURRENT USE OF INSULIN (H): ICD-10-CM

## 2019-09-16 RX ORDER — LISINOPRIL 10 MG/1
TABLET ORAL
Qty: 30 TABLET | Refills: 0 | Status: SHIPPED | OUTPATIENT
Start: 2019-09-16 | End: 2019-10-10

## 2019-09-16 NOTE — TELEPHONE ENCOUNTER
"Routing refill request to provider for review/approval because:  Labs out of range:  CR  T'd up 1 month for provider review.      Requested Prescriptions   Pending Prescriptions Disp Refills     lisinopril (PRINIVIL/ZESTRIL) 10 MG tablet [Pharmacy Med Name: LISINOPRIL 10MG TABS] 90 tablet 0     Sig: TAKE ONE TABLET BY MOUTH ONCE DAILY   Last Written Prescription Date:  6/20/2019  Last Fill Quantity: 90,  # refills: 0   Last office visit: 2/12/2019 with prescribing provider:     Future Office Visit:        ACE Inhibitors (Including Combos) Protocol Failed - 9/16/2019  5:03 AM        Failed - Normal serum creatinine on file in past 12 months     Recent Labs   Lab Test 02/12/19  1348   CR 1.12*           Passed - Blood pressure under 140/90 in past 12 months     BP Readings from Last 3 Encounters:   02/12/19 118/72   06/22/18 143/83   05/31/18 102/62           Passed - Recent (12 mo) or future (30 days) visit within the authorizing provider's specialty     Patient had office visit in the last 12 months or has a visit in the next 30 days with authorizing provider or within the authorizing provider's specialty.  See \"Patient Info\" tab in inbasket, or \"Choose Columns\" in Meds & Orders section of the refill encounter.          Passed - Medication is active on med list        Passed - Patient is age 18 or older        Passed - No active pregnancy on record        Passed - Normal serum potassium on file in past 12 months     Recent Labs   Lab Test 02/12/19  1348   POTASSIUM 4.1           Passed - No positive pregnancy test within past 12 months      Mayda Molina RN      "

## 2019-09-27 DIAGNOSIS — E11.65 TYPE 2 DIABETES MELLITUS WITH HYPERGLYCEMIA, WITHOUT LONG-TERM CURRENT USE OF INSULIN (H): ICD-10-CM

## 2019-09-27 RX ORDER — METFORMIN HCL 500 MG
TABLET, EXTENDED RELEASE 24 HR ORAL
Qty: 90 TABLET | Refills: 1 | Status: SHIPPED | OUTPATIENT
Start: 2019-09-27 | End: 2019-10-16

## 2019-09-27 NOTE — TELEPHONE ENCOUNTER
Metformin  Last Written Prescription Date:  04/02/2019  Last Fill Quantity: 90,  # refills: 5   Last office visit: 02/12/2019 with prescribing provider:  Caro   Medication is being filled for 1 time refill only due to:  Upcoming appointment with PCP.     Future Office Visit:   Next 5 appointments (look out 90 days)    Nov 01, 2019 10:20 AM CDT  [facility] [address] [facility] [address] [phone]        Requested Prescriptions   Pending Prescriptions Disp Refills     metFORMIN (GLUCOPHAGE-XR) 500 MG 24 hr tablet [Pharmacy Med Name: METFORMIN HCL ER 500MG TB24] 90 tablet 5     Sig: TAKE 3 TABLETS ( 1,500 MG) BY MOUTH DAILY ( WITH DINNER)       Biguanide Agents Failed - 9/27/2019  5:02 AM        Failed - Blood pressure less than 140/90 in past 6 months     BP Readings from Last 3 Encounters:   02/12/19 118/72   06/22/18 143/83   05/31/18 102/62           Failed - Patient has documented A1c within the specified period of time.     If HgbA1C is 8 or greater, it needs to be on file within the past 3 months.  If less than 8, must be on file within the past 6 months.   Recent Labs   Lab Test 02/12/19  1348   A1C 7.7*           Passed - Patient has documented LDL within the past 12 mos.     Recent Labs   Lab Test 02/12/19  1348   LDL 65           Passed - Patient has had a Microalbumin in the past 15 mos.     Recent Labs   Lab Test 02/12/19  1344   MICROL 27   UMALCR 11.74           Passed - Patient is age 10 or older        Passed - Patient's CR is NOT>1.4 OR Patient's EGFR is NOT<45 within past 12 mos.     Recent Labs   Lab Test 02/12/19  1348   GFRESTIMATED 50*   GFRESTBLACK 58*     Recent Labs   Lab Test 02/12/19  1348   CR 1.12*           Passed - Patient does NOT have a diagnosis of CHF.        Passed - Medication is active on med list        Passed - Patient is not pregnant        Passed - Patient has not had a  "positive pregnancy test within the past 12 mos.         Passed - Recent (6 mo) or future (30 days) visit within the authorizing provider's specialty     Patient had office visit in the last 6 months or has a visit in the next 30 days with authorizing provider or within the authorizing provider's specialty.  See \"Patient Info\" tab in inbasket, or \"Choose Columns\" in Meds & Orders section of the refill encounter.        Keesha Melchor RN   "

## 2019-09-28 DIAGNOSIS — E11.65 TYPE 2 DIABETES MELLITUS WITH HYPERGLYCEMIA, WITHOUT LONG-TERM CURRENT USE OF INSULIN (H): ICD-10-CM

## 2019-09-30 RX ORDER — METFORMIN HCL 500 MG
TABLET, EXTENDED RELEASE 24 HR ORAL
Qty: 90 TABLET | Refills: 0 | Status: SHIPPED | OUTPATIENT
Start: 2019-09-30 | End: 2019-10-16

## 2019-09-30 NOTE — TELEPHONE ENCOUNTER
Routing refill request to provider for review/approval because:  Labs not current:  BP, A1C  A break in medication      Requested Prescriptions   Pending Prescriptions Disp Refills     metFORMIN (GLUCOPHAGE-XR) 500 MG 24 hr tablet [Pharmacy Med Name: METFORMIN HCL ER 500MG TB24] 90 tablet 5     Sig: TAKE 3 TABLETS ( 1,500 MG) BY MOUTH DAILY ( WITH DINNER)   Last Written Prescription Date:  2/5/2019  Last Fill Quantity: 90,  # refills: 0   Last office visit: 2/12/2019 with prescribing provider:     Future Office Visit:   Next 5 appointments (look out 90 days)    Nov 01, 2019 10:20 AM CDT  [facility] with [facility]  [facility] [address] [facility] [address] [phone]             Biguanide Agents Failed - 9/28/2019  5:03 AM        Failed - Blood pressure less than 140/90 in past 6 months     BP Readings from Last 3 Encounters:   02/12/19 118/72   06/22/18 143/83   05/31/18 102/62           Failed - Patient has documented A1c within the specified period of time.     If HgbA1C is 8 or greater, it needs to be on file within the past 3 months.  If less than 8, must be on file within the past 6 months.     Recent Labs   Lab Test 02/12/19  1348   A1C 7.7*           Passed - Patient has documented LDL within the past 12 mos.     Recent Labs   Lab Test 02/12/19  1348   LDL 65           Passed - Patient has had a Microalbumin in the past 15 mos.     Recent Labs   Lab Test 02/12/19  1344   MICROL 27   UMALCR 11.74           Passed - Patient is age 10 or older        Passed - Patient's CR is NOT>1.4 OR Patient's EGFR is NOT<45 within past 12 mos.     Recent Labs   Lab Test 02/12/19  1348   GFRESTIMATED 50*   GFRESTBLACK 58*     Recent Labs   Lab Test 02/12/19  1348   CR 1.12*           Passed - Patient does NOT have a diagnosis of CHF.        Passed - Medication is active on med list        Passed - Patient is not pregnant        Passed - Patient has  "not had a positive pregnancy test within the past 12 mos.         Passed - Recent (6 mo) or future (30 days) visit within the authorizing provider's specialty     Patient had office visit in the last 6 months or has a visit in the next 30 days with authorizing provider or within the authorizing provider's specialty.  See \"Patient Info\" tab in inbasket, or \"Choose Columns\" in Meds & Orders section of the refill encounter.          Mayda Molina RN      "

## 2019-10-01 ENCOUNTER — HOSPITAL ENCOUNTER (OUTPATIENT)
Dept: MAMMOGRAPHY | Facility: CLINIC | Age: 69
Discharge: HOME | End: 2019-10-01
Attending: INTERNAL MEDICINE | Admitting: INTERNAL MEDICINE
Payer: COMMERCIAL

## 2019-10-01 DIAGNOSIS — Z12.31 VISIT FOR SCREENING MAMMOGRAM: ICD-10-CM

## 2019-10-01 PROCEDURE — 77063 BREAST TOMOSYNTHESIS BI: CPT

## 2019-10-02 DIAGNOSIS — E11.65 TYPE 2 DIABETES MELLITUS WITH HYPERGLYCEMIA, WITHOUT LONG-TERM CURRENT USE OF INSULIN (H): Primary | ICD-10-CM

## 2019-10-02 RX ORDER — METFORMIN HCL 500 MG
TABLET, EXTENDED RELEASE 24 HR ORAL
Qty: 90 TABLET | Refills: 0 | Status: SHIPPED | OUTPATIENT
Start: 2019-10-02 | End: 2019-12-02

## 2019-10-02 NOTE — TELEPHONE ENCOUNTER
Metformin  Last Written Prescription Date:  9/30/19  Last Fill Quantity: 90( take 3 per day with dinner) ,  # refills: 0   Last office visit: 5/31/2018 with prescribing provider:     Future Office Visit:   Next 5 appointments (look out 90 days)    Nov 01, 2019 10:20 AM CDT  Pre-Op physical with William Elizondo DO  [facility] [address] [facility] [address]  [phone]       Prescription approved per [facility] Refill Protocol for one month as has appt in [facility].  NANCI Mckeon

## 2019-10-08 ENCOUNTER — HOSPITAL ENCOUNTER (OUTPATIENT)
Dept: MAMMOGRAPHY | Facility: CLINIC | Age: 69
Discharge: HOME | End: 2019-10-08
Attending: INTERNAL MEDICINE | Admitting: INTERNAL MEDICINE
Payer: COMMERCIAL

## 2019-10-08 DIAGNOSIS — R92.8 ABNORMAL MAMMOGRAM: ICD-10-CM

## 2019-10-08 PROCEDURE — 77065 DX MAMMO INCL CAD UNI: CPT | Mod: LT

## 2019-10-08 NOTE — LETTER
Mera Valdivia  [address] [facility] [address]      October 8, 2019        Dear Mera Valdivia:    Thank you for your recent visit.    Breast Imaging Result: Based on your recent breast imaging, you have a suspicious area that usually requires a biopsy, at which time a small tissue sample would be taken from your breast.      If you have already made these arrangements, please disregard this letter.    A report of your breast imaging results was sent to: William Elizondo    Your breast imaging will become part of your medical file here at [place] for at least 10 years. You are responsible for informing any new health care provider or breast imaging facility of the date and location of this examination.    We appreciate the opportunity to participate in your health care.    Sincerely,    Dr. Kishor Eugene  Interpreting Radiologist

## 2019-10-10 DIAGNOSIS — M85.89 OSTEOPENIA OF MULTIPLE SITES: ICD-10-CM

## 2019-10-10 DIAGNOSIS — E11.65 TYPE 2 DIABETES MELLITUS WITH HYPERGLYCEMIA, WITHOUT LONG-TERM CURRENT USE OF INSULIN (H): ICD-10-CM

## 2019-10-10 DIAGNOSIS — E78.5 HYPERLIPIDEMIA LDL GOAL <100: ICD-10-CM

## 2019-10-10 RX ORDER — LISINOPRIL 10 MG/1
TABLET ORAL
Qty: 30 TABLET | Refills: 0 | Status: SHIPPED | OUTPATIENT
Start: 2019-10-10 | End: 2019-11-15

## 2019-10-10 RX ORDER — SIMVASTATIN 10 MG
TABLET ORAL
Qty: 30 TABLET | Refills: 3 | Status: SHIPPED | OUTPATIENT
Start: 2019-10-10 | End: 2020-02-17

## 2019-10-10 RX ORDER — ALENDRONATE SODIUM 70 MG/1
70 TABLET ORAL
Qty: 4 TABLET | Refills: 0 | Status: SHIPPED | OUTPATIENT
Start: 2019-10-10 | End: 2023-11-21

## 2019-10-10 NOTE — TELEPHONE ENCOUNTER
"Requested Prescriptions   Pending Prescriptions Disp Refills     lisinopril (PRINIVIL/ZESTRIL) 10 MG tablet [Pharmacy Med Name: LISINOPRIL 10MG TABS] 30 tablet 0     Sig: TAKE ONE TABLET BY MOUTH ONCE DAILY   Last Written Prescription Date:  9/16/2019  Last Fill Quantity: 30,  # refills: 0   Last office visit: 2/12/2019 with prescribing provider:     Future Office Visit:   Next 5 appointments (look out 90 days)    Nov 01, 2019 10:20 AM CDT  Pre-Op physical with William Elizondo DO  [facility] [address] [facility] [address] [phone]             ACE Inhibitors (Including Combos) Protocol Failed - 10/10/2019  5:02 AM        Failed - Normal serum creatinine on file in past 12 months     Recent Labs   Lab Test 02/12/19  1348   CR 1.12*           Passed - Blood pressure under 140/90 in past 12 months     BP Readings from Last 3 Encounters:   02/12/19 118/72   06/22/18 143/83   05/31/18 102/62           Passed - Recent (12 mo) or future (30 days) visit within the authorizing provider's specialty     Patient has had an office visit with the authorizing provider or a provider within the authorizing providers department within the previous 12 mos or has a future within next 30 days. See \"Patient Info\" tab in inbasket, or \"Choose Columns\" in Meds & Orders section of the refill encounter.            Passed - Medication is active on med list        Passed - Patient is age 18 or older        Passed - No active pregnancy on record        Passed - Normal serum potassium on file in past 12 months     Recent Labs   Lab Test 02/12/19  1348   POTASSIUM 4.1           Passed - No positive pregnancy test within past 12 months   Routing refill request to provider for review/approval         simvastatin (ZOCOR) 10 MG tablet [Pharmacy Med Name: SIMVASTATIN 10MG TABS] 30 tablet 7     Sig: TAKE ONE TABLET BY MOUTH EVERY NIGHT AT BEDTIME   Last Written Prescription Date:  " "2/12/2019  Last Fill Quantity: 30,  # refills: 7   Last office visit: 2/12/2019 with prescribing provider:     Future Office Visit:   Next 5 appointments (look out 90 days)    Nov 01, 2019 10:20 AM CDT  Pre-Op physical with William Elizondo DO  [facility] [address] [facility] [address]  [phone]             Statins Protocol Passed - 10/10/2019  5:02 AM        Passed - LDL on file in past 12 months     Recent Labs   Lab Test 02/12/19  1348   LDL 65           Passed - No abnormal creatine kinase in past 12 months     No lab results found.           Passed - Recent (12 mo) or future (30 days) visit within the authorizing provider's specialty     Patient has had an office visit with the authorizing provider or a provider within the authorizing providers department within the previous 12 mos or has a future within next 30 days. See \"Patient Info\" tab in inbasket, or \"Choose Columns\" in Meds & Orders section of the refill encounter.            Passed - Medication is active on med list        Passed - Patient is age 18 or older        Passed - No active pregnancy on record        Passed - No positive pregnancy test in past 12 months      Prescription approved per [facility].  Mayda Molina RN      "

## 2019-10-10 NOTE — TELEPHONE ENCOUNTER
lisinopril (PRINIVIL/ZESTRIL) 10 MG tablet [Pharmacy Med Name: LISINOPRIL 10MG TABS] 30 tablet 0    Sig: TAKE ONE TABLET BY MOUTH ONCE DAILY   Routing refill request to provider for review/approval because:  Labs out of range:  CR  Lab Test 02/12/19  1348   CR 1.12*   T'd up 1 month for provider review.  Pre-Op office visit Novemeber 1, 2019      Mayda Molina RN

## 2019-10-10 NOTE — TELEPHONE ENCOUNTER
"Alendronate  Last Written Prescription Date:  06/12/2019  Last Fill Quantity: 4,  # refills: 3   Last office visit: 2/12/2019 with prescribing provider:  Caro  Future Office Visit:   Next 5 appointments (look out 90 days)    Nov 01, 2019 10:20 AM CDT  [facility] [address] [facility] [address] [phone]         Medication is being filled for 1 time refill only due to:  patient has appointmatn on 11/1/2019  Requested Prescriptions   Pending Prescriptions Disp Refills     alendronate (FOSAMAX) 70 MG tablet 4 tablet 3     Sig: Take 1 tablet (70 mg) by mouth every 7 days Take with over 8 ounces water and stay upright for at least 30 minutes after dose.  Take at least 60 minutes before breakfast       Bisphosphonates Failed - 10/10/2019  9:33 AM        Failed - Dexa on file within past 2 years     Please review last Dexa result.           Failed - Normal serum creatinine on file within past 12 months     Recent Labs   Lab Test 02/12/19  1348   CR 1.12*             Passed - Recent (12 mo) or future (30 days) visit within the authorizing provider's specialty     Patient has had an office visit with the authorizing provider or a provider within the authorizing providers department within the previous 12 mos or has a future within next 30 days. See \"Patient Info\" tab in inbasket, or \"Choose Columns\" in Meds & Orders section of the refill encounter.              Passed - Medication is active on med list        Passed - Patient is age 18 or older        "

## 2019-10-11 ENCOUNTER — ANCILLARY PROCEDURE (OUTPATIENT)
Dept: MAMMOGRAPHY | Facility: CLINIC | Age: 69
End: 2019-10-11
Attending: INTERNAL MEDICINE
Payer: COMMERCIAL

## 2019-10-11 DIAGNOSIS — E11.65 TYPE 2 DIABETES MELLITUS WITH HYPERGLYCEMIA, WITHOUT LONG-TERM CURRENT USE OF INSULIN (H): ICD-10-CM

## 2019-10-11 DIAGNOSIS — E78.5 HYPERLIPIDEMIA LDL GOAL <100: ICD-10-CM

## 2019-10-11 DIAGNOSIS — R92.0 ABNORMAL MAMMOGRAM WITH MICROCALCIFICATION: ICD-10-CM

## 2019-10-11 PROCEDURE — 19081 BX BREAST 1ST LESION STRTCTC: CPT | Mod: LT

## 2019-10-11 PROCEDURE — 88305 TISSUE EXAM BY PATHOLOGIST: CPT

## 2019-10-11 RX ORDER — SIMVASTATIN 10 MG
TABLET ORAL
Qty: 30 TABLET | Refills: 7 | OUTPATIENT
Start: 2019-10-11

## 2019-10-11 RX ORDER — LIDOCAINE HYDROCHLORIDE AND EPINEPHRINE 10; 10 MG/ML; UG/ML
16 INJECTION, SOLUTION INFILTRATION; PERINEURAL ONCE
Status: COMPLETED | OUTPATIENT
Start: 2019-10-11 | End: 2019-10-11

## 2019-10-11 RX ADMIN — LIDOCAINE HYDROCHLORIDE AND EPINEPHRINE 16 ML: 10; 10 INJECTION, SOLUTION INFILTRATION; PERINEURAL at 13:55

## 2019-10-11 RX ADMIN — Medication 1 MEQ: at 13:55

## 2019-10-11 NOTE — TELEPHONE ENCOUNTER
"Requested Prescriptions   Pending Prescriptions Disp Refills     simvastatin (ZOCOR) 10 MG tablet [Pharmacy Med Name: SIMVASTATIN 10MG TABS] 30 tablet 7     Sig: TAKE ONE TABLET BY MOUTH EVERY NIGHT AT BEDTIME   Last Written Prescription Date:  10/10/19  Last Fill Quantity: 30,  # refills: 3   Last office visit: 2/12/2019 with prescribing provider:  2/12/19   Future Office Visit:   Next 5 appointments (look out 90 days)    Nov 01, 2019 10:20 AM CDT  [facility]Op physical [facility] DO  [facility] [address] [facility] [id number] [phone]           Statins Protocol Passed - 10/11/2019  5:02 AM        Passed - LDL on file in past 12 months     Recent Labs   Lab Test 02/12/19  1348   LDL 65             Passed - No abnormal creatine kinase in past 12 months     No lab results found.             Passed - Recent (12 mo) or future (30 days) visit within the authorizing provider's specialty     Patient has had an office visit with the authorizing provider or a provider within the authorizing providers department within the previous 12 mos or has a future within next 30 days. See \"Patient Info\" tab in inbasket, or \"Choose Columns\" in Meds & Orders section of the refill encounter.              Passed - Medication is active on med list        Passed - Patient is age 18 or older        Passed - No active pregnancy on record        Passed - No positive pregnancy test in past 12 months        "

## 2019-10-11 NOTE — LETTER
"October 23, 2019      Mera Recinos  [address] [facility] [address]        Dear ,    We are writing to inform you of your test results.    The breast biopsy is benign.  No signs of cancer were noted.   Feel free to contact me via the office or My Chart if you have any questions regarding the above.     Resulted Orders   Surgical pathology exam   Result Value Ref Range    Copath Report       Patient Name: MERA RECINOS  MR#: [id number]  Specimen #: [id number]  Collected: 10/11/2019  Received: 10/11/2019  Reported: 10/15/2019 22:14  Ordering Phy(s): DAVE MOSES    For improved result formatting, select 'View Enhanced Report Format' under   Linked Documents section.    SPECIMEN(S):  Left breast biopsy, posterior    FINAL DIAGNOSIS:  BREAST, LEFT, POSTERIOR, CALCIFICATIONS, STEREOTACTIC CORE BIOPSY:  -Involuted breast parenchyma with stromal (predominantly) and luminal   calcifications  -No atypical or malignant findings    I have personally reviewed all specimens and/or slides, including the   listed special stains, and used them  with my medical judgement to determine or confirm the final diagnosis.    Electronically signed out by:    Shazia Shin M.D., [facility]    CLINICAL HISTORY:  The patient is a 69 year old woman with left breast calcifications   detected by screening mammogram, and  further characterized by diagnostic mammogram (posterior, slig htly   outer-central; somewhat pleomorphic).  .Procedure: left breast stereotactic core biopsy (\"T\" shaped biopsy   marker).    GROSS:  The specimen is received in formalin with proper patient identification,   labeled \"left breast, posterior\".  It  includes a gray cassette containing two tan-yellow cores of fibroadipose   ranging from 1.1 - 2.2 cm in length x  0.2 - 0.3 cm in diameter.  The cores from the gray cassette are entirely   submitted in cassette A1CA.  Free  floating in the same container are four tan-yellow " cores of fibroadipose   ranging from 2.2 - 3.4 cm in greatest  dimension.  The free floating cores are entirely submitted in cassettes A2   - A3.    The specimen is collected and placed in formalin at 1415 on 10/11/2019.   (Dictated by: aMrty DE LEON  10/14/2019 09:39 AM)    MICROSCOPIC:  Microscopic examination is performed.    The technical component of this testing was completed at the [facility], with the p rofessional component performed   at the [facility], [address] [place] [phone] ([phone])    CPT Codes:  A: 31649-EZ8    COLLECTION SITE:  Client: [facility]  Location: [facility] ([facility]    Resident  [id number]         If you have any questions or concerns, please call the clinic at the number listed above.       Sincerely,         Nuclear Med Radiologist

## 2019-10-11 NOTE — TELEPHONE ENCOUNTER
Prescription was sent 10/10/19 for #30 with 3 refills.  Pharmacy notified via E-Prescribe refusal.     Wanda Galvan RN  [facility]

## 2019-10-12 DIAGNOSIS — E11.65 TYPE 2 DIABETES MELLITUS WITH HYPERGLYCEMIA, WITHOUT LONG-TERM CURRENT USE OF INSULIN (H): ICD-10-CM

## 2019-10-12 DIAGNOSIS — E78.5 HYPERLIPIDEMIA LDL GOAL <100: ICD-10-CM

## 2019-10-14 RX ORDER — SIMVASTATIN 10 MG
TABLET ORAL
Qty: 30 TABLET | Refills: 7 | OUTPATIENT
Start: 2019-10-14

## 2019-10-14 NOTE — TELEPHONE ENCOUNTER
Prescription was sent 10/10/19 for #30 with 3 refills.  Pharmacy notified via E-Prescribe refusal.     Wanda Galvan RN  [facility]

## 2019-10-14 NOTE — TELEPHONE ENCOUNTER
"Requested Prescriptions   Pending Prescriptions Disp Refills     simvastatin (ZOCOR) 10 MG tablet [Pharmacy Med Name: SIMVASTATIN 10MG TABS] 30 tablet 7     Sig: TAKE ONE TABLET BY MOUTH EVERY NIGHT AT BEDTIME   Last Written Prescription Date:  10/10/19  Last Fill Quantity: 30,  # refills: 3   Last office visit: 2/12/2019 with prescribing provider:  2/12/19   Future Office Visit:   Next 5 appointments (look out 90 days)    Nov 01, 2019 10:20 AM CDT  Pre-Op physical with William Elizondo DO  [facility] [address] [facility] [id number] [phone]           Statins Protocol Passed - 10/12/2019  5:06 AM        Passed - LDL on file in past 12 months     Recent Labs   Lab Test 02/12/19  1348   LDL 65             Passed - No abnormal creatine kinase in past 12 months     No lab results found.             Passed - Recent (12 mo) or future (30 days) visit within the authorizing provider's specialty     Patient has had an office visit with the authorizing provider or a provider within the authorizing providers department within the previous 12 mos or has a future within next 30 days. See \"Patient Info\" tab in inbasket, or \"Choose Columns\" in Meds & Orders section of the refill encounter.              Passed - Medication is active on med list        Passed - Patient is age 18 or older        Passed - No active pregnancy on record        Passed - No positive pregnancy test in past 12 months        "

## 2019-10-15 ENCOUNTER — TELEPHONE (OUTPATIENT)
Dept: FAMILY MEDICINE | Facility: OTHER | Age: 69
End: 2019-10-15

## 2019-10-15 DIAGNOSIS — M85.89 OSTEOPENIA OF MULTIPLE SITES: ICD-10-CM

## 2019-10-15 LAB — COPATH REPORT: NORMAL

## 2019-10-15 RX ORDER — ALENDRONATE SODIUM 70 MG/1
70 TABLET ORAL
Qty: 4 TABLET | Refills: 0 | OUTPATIENT
Start: 2019-10-15

## 2019-10-15 NOTE — TELEPHONE ENCOUNTER
"Fosamax  Last Written Prescription Date:  10/10/2019  Last Fill Quantity: 4,  # refills: 0   Last office visit: 2/12/2019 with prescribing provider:  Caro   Future Office Visit:   Next 5 appointments (look out 90 days)    Nov 01, 2019 10:20 AM CDT  [facility] physical with [facility] DO  [facility] [address] [facility] [address] [phone]           Requested Prescriptions   Pending Prescriptions Disp Refills     alendronate (FOSAMAX) 70 MG tablet 4 tablet 0     Sig: Take 1 tablet (70 mg) by mouth every 7 days Take with over 8 ounces water and stay upright for at least 30 minutes after dose.  Take at least 60 minutes before breakfast       Bisphosphonates Failed - 10/15/2019 10:07 AM        Failed - Dexa on file within past 2 years     Please review last Dexa result.           Failed - Normal serum creatinine on file within past 12 months     Recent Labs   Lab Test 02/12/19  1348   CR 1.12*             Passed - Recent (12 mo) or future (30 days) visit within the authorizing provider's specialty     Patient has had an office visit with the authorizing provider or a provider within the authorizing providers department within the previous 12 mos or has a future within next 30 days. See \"Patient Info\" tab in inbasket, or \"Choose Columns\" in Meds & Orders section of the refill encounter.              Passed - Medication is active on med list        Passed - Patient is age 18 or older      Rx was sent 10/10/2019 for 1 month and 0 refills. Patient should have medication available at pharmacy. Pt has an appt on 11/1/2019  Pharmacy notified via E-prescribe refusal    Acacia Coronado RN on 10/15/2019 at 11:40 AM    "

## 2019-10-15 NOTE — TELEPHONE ENCOUNTER
----- Message from William Elizondo DO sent at 10/15/2019  6:52 AM CDT -----  The patient's mammogram was suspicious for malignancy.  I see where the radiologist spoke to her about the results but I am uncertain if a surgical evaluation has been scheduled.  Would you please contact the patient and see if an appointment has been made with a general surgeon (Dr. Singh)?  If not, could you please help her set one up?  Thank you.

## 2019-10-15 NOTE — TELEPHONE ENCOUNTER
Spoke with patient, she is still waiting for pathology results, once those are done, she will be contacted by the Nurse Navigator regarding further work up.    Azalia Waggoner XRO/

## 2019-10-16 ENCOUNTER — TELEPHONE (OUTPATIENT)
Dept: GENERAL RADIOLOGY | Facility: CLINIC | Age: 69
End: 2019-10-16

## 2019-10-16 NOTE — TELEPHONE ENCOUNTER
Spoke with patient regarding left breast biopsy results, which indicate benign calcifications. Notified pt that the radiologist recommendation is continued yearly screening mammogram. Pt verbalized understanding of these results and all questions answered to her satisfaction.

## 2019-10-22 DIAGNOSIS — E11.65 TYPE 2 DIABETES MELLITUS WITH HYPERGLYCEMIA, WITHOUT LONG-TERM CURRENT USE OF INSULIN (H): ICD-10-CM

## 2019-10-22 DIAGNOSIS — E78.5 HYPERLIPIDEMIA LDL GOAL <100: ICD-10-CM

## 2019-10-23 RX ORDER — SIMVASTATIN 10 MG
TABLET ORAL
Qty: 30 TABLET | Refills: 7 | OUTPATIENT
Start: 2019-10-23

## 2019-10-23 NOTE — RESULT ENCOUNTER NOTE
Dear Mera, your recent test results are attached.    The breast biopsy is benign.  No signs of cancer were noted.  Feel free to contact me via the office or My Chart if you have any questions regarding the above.

## 2019-10-23 NOTE — TELEPHONE ENCOUNTER
Prescription was sent 10/10/19 for #30 with 3 refills.  Pharmacy notified via E-Prescribe refusal.     Wanda Galvan RN  [facility]

## 2019-10-23 NOTE — TELEPHONE ENCOUNTER
"Requested Prescriptions   Pending Prescriptions Disp Refills     simvastatin (ZOCOR) 10 MG tablet [Pharmacy Med Name: SIMVASTATIN 10MG TABS] 30 tablet 7     Sig: TAKE ONE TABLET BY MOUTH EVERY NIGHT AT BEDTIME   Last Written Prescription Date:  10/10/19  Last Fill Quantity: 30,  # refills: 3   Last office visit: 2/12/2019 with prescribing provider:  2/12/19   Future Office Visit:   Next 5 appointments (look out 90 days)    Nov 01, 2019 10:20 AM CDT  Pre-Op physical with William Elizondo DO  [facility] [address] [facility] [id number] [phone]           Statins Protocol Passed - 10/22/2019 11:38 PM        Passed - LDL on file in past 12 months     Recent Labs   Lab Test 02/12/19  1348   LDL 65             Passed - No abnormal creatine kinase in past 12 months     No lab results found.             Passed - Recent (12 mo) or future (30 days) visit within the authorizing provider's specialty     Patient has had an office visit with the authorizing provider or a provider within the authorizing providers department within the previous 12 mos or has a future within next 30 days. See \"Patient Info\" tab in inbasket, or \"Choose Columns\" in Meds & Orders section of the refill encounter.              Passed - Medication is active on med list        Passed - Patient is age 18 or older        Passed - No active pregnancy on record        Passed - No positive pregnancy test in past 12 months        "

## 2019-11-01 ENCOUNTER — OFFICE VISIT (OUTPATIENT)
Dept: FAMILY MEDICINE | Facility: OTHER | Age: 69
End: 2019-11-01
Payer: COMMERCIAL

## 2019-11-01 VITALS
BODY MASS INDEX: 31.65 KG/M2 | HEART RATE: 92 BPM | DIASTOLIC BLOOD PRESSURE: 82 MMHG | RESPIRATION RATE: 20 BRPM | OXYGEN SATURATION: 96 % | TEMPERATURE: 97.5 F | WEIGHT: 185.4 LBS | SYSTOLIC BLOOD PRESSURE: 134 MMHG | HEIGHT: 64 IN

## 2019-11-01 DIAGNOSIS — H25.9 AGE-RELATED CATARACT OF BOTH EYES, UNSPECIFIED AGE-RELATED CATARACT TYPE: ICD-10-CM

## 2019-11-01 DIAGNOSIS — E11.65 TYPE 2 DIABETES MELLITUS WITH HYPERGLYCEMIA, WITHOUT LONG-TERM CURRENT USE OF INSULIN (H): ICD-10-CM

## 2019-11-01 DIAGNOSIS — Z01.818 PREOP GENERAL PHYSICAL EXAM: Primary | ICD-10-CM

## 2019-11-01 DIAGNOSIS — E78.5 HYPERLIPIDEMIA LDL GOAL <100: ICD-10-CM

## 2019-11-01 DIAGNOSIS — Z12.11 SPECIAL SCREENING FOR MALIGNANT NEOPLASMS, COLON: ICD-10-CM

## 2019-11-01 DIAGNOSIS — I10 BENIGN ESSENTIAL HYPERTENSION: ICD-10-CM

## 2019-11-01 LAB
ANION GAP SERPL CALCULATED.3IONS-SCNC: 8 MMOL/L (ref 3–14)
BUN SERPL-MCNC: 12 MG/DL (ref 7–30)
CALCIUM SERPL-MCNC: 9.2 MG/DL (ref 8.5–10.1)
CHLORIDE SERPL-SCNC: 103 MMOL/L (ref 94–109)
CO2 SERPL-SCNC: 28 MMOL/L (ref 20–32)
CREAT SERPL-MCNC: 1 MG/DL (ref 0.52–1.04)
GFR SERPL CREATININE-BSD FRML MDRD: 57 ML/MIN/{1.73_M2}
GLUCOSE SERPL-MCNC: 214 MG/DL (ref 70–99)
HBA1C MFR BLD: 7.7 % (ref 0–5.6)
POTASSIUM SERPL-SCNC: 4.3 MMOL/L (ref 3.4–5.3)
SODIUM SERPL-SCNC: 139 MMOL/L (ref 133–144)

## 2019-11-01 PROCEDURE — 36415 COLL VENOUS BLD VENIPUNCTURE: CPT | Performed by: INTERNAL MEDICINE

## 2019-11-01 PROCEDURE — 99214 OFFICE O/P EST MOD 30 MIN: CPT | Performed by: INTERNAL MEDICINE

## 2019-11-01 PROCEDURE — 80048 BASIC METABOLIC PNL TOTAL CA: CPT | Performed by: INTERNAL MEDICINE

## 2019-11-01 PROCEDURE — 83036 HEMOGLOBIN GLYCOSYLATED A1C: CPT | Performed by: INTERNAL MEDICINE

## 2019-11-01 ASSESSMENT — MIFFLIN-ST. JEOR: SCORE: 1345.97

## 2019-11-01 ASSESSMENT — PAIN SCALES - GENERAL: PAINLEVEL: NO PAIN (0)

## 2019-11-01 NOTE — PROGRESS NOTES
[place] [address] [facility] [id number] [phone]  Dept: [phone]    PRE-OP EVALUATION:  Today's date: 2019    Mera Valdivia (: 1950) presents for pre-operative evaluation assessment.  She requires evaluation and anesthesia risk assessment prior to undergoing surgery/procedure for treatment of cataract .    Fax number for surgical facility: Fax: [phone]  Primary Physician: William Elizondo  Type of Anesthesia Anticipated: to be determined    Patient has a Health Care Directive or Living Will:  NO    Preop Questions 2019   Who is doing your surgery? [facility]   What are you having done? cataract surgery both eyes   Date of Surgery/Procedure: 2019   Facility or Hospital where procedure/surgery will be performed: [facility]   1.  Do you have a history of Heart attack, stroke, stent, coronary bypass surgery, or other heart surgery? No   2.  Do you ever have any pain or discomfort in your chest? No   3.  Do you have a history of  Heart Failure? No   4.   Are you troubled by shortness of breath when:  walking on a level surface, or up a slight hill, or at night? No   5.  Do you currently have a cold, bronchitis or other respiratory infection? No   6.  Do you have a cough, shortness of breath, or wheezing? No   7.  Do you sometimes get pains in the calves of your legs when you walk? No   8. Do you or anyone in your family have previous history of blood clots? No   9.  Do you or does anyone in your family have a serious bleeding problem such as prolonged bleeding following surgeries or cuts? No   10. Have you ever had problems with anemia or been told to take iron pills? No   11. Have you had any abnormal blood loss such as black, tarry or bloody stools, or abnormal vaginal bleeding? No   12. Have you ever had a blood transfusion? No   13. Have you or any of your relatives ever had problems with anesthesia?  Nitous oxide   14. Do you have sleep apnea, excessive snoring or daytime drowsiness? No   15. Do you have any prosthetic heart valves? No   16. Do you have prosthetic joints? No   17. Is there any chance that you may be pregnant? No         HPI:     HPI related to upcoming procedure: The patient has progressive bilateral cataracts and is anticipating staged cataract extraction with intraocular lens implantation      See problem list for active medical problems.  Problems all longstanding and stable, except as noted/documented.  See ROS for pertinent symptoms related to these conditions.      MEDICAL HISTORY:     Patient Active Problem List    Diagnosis Date Noted     Closed nondisplaced intertrochanteric fracture of right femur with routine healing, subsequent encounter 06/22/2018     Priority: Medium     Osteopenia of multiple sites 10/11/2017     Priority: Medium     Type 2 diabetes mellitus with hyperglycemia, without long-term current use of insulin (H) 05/03/2017     Priority: Medium     Acute anterior circulation transient ischemic attack 10/29/2016     Priority: Medium     CKD (chronic kidney disease) stage 3, GFR 30-59 ml/min (H) 10/29/2016     Priority: Medium     Benign essential hypertension 10/24/2016     Priority: Medium     Endometrial cells on cervical Pap smear inconsistent w/LMP 11/11/2015     Priority: Medium     11/11/15 pap NIL/neg   1/15/16 consult with Dr. Walden for endometrial cells on pap. Following.        Hyperlipidemia LDL goal <100 02/19/2015     Priority: Medium     Elevated triglycerides with high cholesterol 11/13/2012     Priority: Medium     Advanced directives, counseling/discussion 11/06/2012     Priority: Medium     Discussed advance care planning with patient; information given to patient to review.Luli Thakur LPN   11/6/2012           Past Medical History:   Diagnosis Date     Basal cell carcinoma      Endometrial cells on cervical Pap smear inconsistent w/LMP 11/11/15    pap  NIL/neg      Past Surgical History:   Procedure Laterality Date     DILATION AND CURETTAGE, OPERATIVE HYSTEROSCOPY, COMBINED N/A 2/16/2016    Procedure: COMBINED DILATION AND CURETTAGE, OPERATIVE HYSTEROSCOPY;  Surgeon: Jenae Walden MD;  Location: [facility] OR     HC FLEX SIGMOIDOSCOPY W/WO DELILAH SPEC BY BRUSH/WASH  1997     HC REVISE MEDIAN N/CARPAL TUNNEL SURG  1985?     Current Outpatient Medications   Medication Sig Dispense Refill     acetaminophen (TYLENOL) 325 MG tablet Take 2 tablets (650 mg) by mouth every 4 hours as needed for mild pain 100 tablet      alendronate (FOSAMAX) 70 MG tablet Take 1 tablet (70 mg) by mouth every 7 days Take with over 8 ounces water and stay upright for at least 30 minutes after dose.  Take at least 60 minutes before breakfast 4 tablet 0     amLODIPine (NORVASC) 5 MG tablet TAKE ONE TABLET BY MOUTH EVERY DAY 90 tablet 3     aspirin 81 MG EC tablet Take 1 tablet (81 mg) by mouth daily 90 tablet 3     calcium-vitamin D (CALTRATE) 600-400 MG-UNIT per tablet Take 1 tablet by mouth 2 times daily       lisinopril (PRINIVIL/ZESTRIL) 10 MG tablet TAKE ONE TABLET BY MOUTH ONCE DAILY 30 tablet 0     metFORMIN (GLUCOPHAGE-XR) 500 MG 24 hr tablet TAKE 3 TABLETS ( 1,500 MG) BY MOUTH DAILY ( WITH DINNER) 90 tablet 0     simvastatin (ZOCOR) 10 MG tablet TAKE ONE TABLET BY MOUTH EVERY NIGHT AT BEDTIME 30 tablet 3     OTC products: None, except as noted above    Allergies   Allergen Reactions     Nitrous Oxide      Doesn't recall any reaction to this drug      Latex Allergy: NO    Social History     Tobacco Use     Smoking status: Never Smoker     Smokeless tobacco: Never Used   Substance Use Topics     Alcohol use: Yes     Alcohol/week: 0.0 standard drinks     Comment: once in a while     History   Drug Use No       REVIEW OF SYSTEMS:   CONSTITUTIONAL: NEGATIVE for fever, chills, change in weight  INTEGUMENTARY/SKIN: NEGATIVE for worrisome rashes, moles or lesions  EYES: Visual decrease is  "consistent with the presence of cataracts.  ENT/MOUTH: NEGATIVE for ear, mouth and throat problems  RESP: NEGATIVE for significant cough or SOB  CV: NEGATIVE for chest pain, palpitations or peripheral edema  GI: NEGATIVE for nausea, abdominal pain, heartburn, or change in bowel habits  : NEGATIVE for frequency, dysuria, or hematuria  MUSCULOSKELETAL: NEGATIVE for significant arthralgias or myalgia  NEURO: NEGATIVE for weakness, dizziness or paresthesias  ENDOCRINE: NEGATIVE for temperature intolerance, skin/hair changes  HEME: NEGATIVE for bleeding problems  PSYCHIATRIC: NEGATIVE for changes in mood or affect    EXAM:   /82 (BP Location: Right arm, Patient Position: Sitting, Cuff Size: Adult Large)   Pulse 92   Temp 97.5  F (36.4  C) (Temporal)   Resp 20   Ht 1.626 m (5' 4\")   Wt 84.1 kg (185 lb 6.4 oz)   SpO2 96%   BMI 31.82 kg/m      GENERAL APPEARANCE: healthy, alert and no distress     EYES: Eye examination deferred to ophthalmology     HENT: ear canals and TM's normal and nose and mouth without ulcers or lesions     NECK: no adenopathy, no asymmetry, masses, or scars and thyroid normal to palpation     RESP: lungs clear to auscultation - no rales, rhonchi or wheezes     CV: regular rates and rhythm, normal S1 S2, no S3 or S4 and no murmur, click or rub     ABDOMEN:  soft, nontender, no HSM or masses and bowel sounds normal     MS: extremities normal- no gross deformities noted, no evidence of inflammation in joints, FROM in all extremities.     SKIN: no suspicious lesions or rashes     NEURO: Normal strength and tone, sensory exam grossly normal, mentation intact and speech normal     PSYCH: mentation appears normal. and affect normal/bright     LYMPHATICS: No cervical adenopathy    DIAGNOSTICS:     Labs Resulted Today:   Results for orders placed or performed in visit on 11/01/19   Hemoglobin A1c     Status: Abnormal   Result Value Ref Range    Hemoglobin A1C 7.7 (H) 0 - 5.6 %   Basic metabolic " panel     Status: Abnormal   Result Value Ref Range    Sodium 139 133 - 144 mmol/L    Potassium 4.3 3.4 - 5.3 mmol/L    Chloride 103 94 - 109 mmol/L    Carbon Dioxide 28 20 - 32 mmol/L    Anion Gap 8 3 - 14 mmol/L    Glucose 214 (H) 70 - 99 mg/dL    Urea Nitrogen 12 7 - 30 mg/dL    Creatinine 1.00 0.52 - 1.04 mg/dL    GFR Estimate 57 (L) >60 mL/min/[1.73_m2]    GFR Estimate If Black 66 >60 mL/min/[1.73_m2]    Calcium 9.2 8.5 - 10.1 mg/dL       Recent Labs   Lab Test 02/12/19  1348 02/13/18  1255  10/29/16  2102  11/06/12  1509   HGB 13.3  --   --  13.0  --  15.2   PLT  --   --   --  111*  --  172   INR  --   --   --  0.97  --   --      --   --  137   < > 140   POTASSIUM 4.1  --   --  4.3   < > 3.9   CR 1.12* 1.08*  --  1.14*   < > 0.82   A1C 7.7* 7.9*   < >  --    < >  --     < > = values in this interval not displayed.        IMPRESSION:   Reason for surgery/procedure: Decreased visual acuity secondary to the presence of bilateral cataract  Diagnosis/reason for consult: Perioperative risk assessment in a pleasant 69-year-old female with:  1. Preop general physical exam    2. Age-related cataract of both eyes, unspecified age-related cataract type    3. Type 2 diabetes mellitus with hyperglycemia, without long-term current use of insulin (H)  - Hemoglobin A1c  - Basic metabolic panel    4. Benign essential hypertension    5. Hyperlipidemia LDL goal <100    6. Special screening for malignant neoplasms, colon  - Fecal colorectal cancer screen (FIT); Future      The proposed surgical procedure is considered LOW risk.    REVISED CARDIAC RISK INDEX  The patient has the following serious cardiovascular risks for perioperative complications such as (MI, PE, VFib and 3  AV Block):  No serious cardiac risks  INTERPRETATION: 0 risks: Class I (very low risk - 0.4% complication rate)    The patient has the following additional risks for perioperative complications:  No identified additional risks      ICD-10-CM    1.  Preop general physical exam Z01.818        RECOMMENDATIONS:         --Patient is to take all scheduled medications on the day of surgery EXCEPT   Patient will abstain from aspirin 5 days prior to the procedure and will not take lisinopril the morning of the procedure.      APPROVAL GIVEN to proceed with proposed procedure, without further diagnostic evaluation       Signed Electronically by: William Elizondo DO    Copy of this evaluation report is provided to requesting physician.    [place] Preop Guidelines    Revised Cardiac Risk Index

## 2019-11-01 NOTE — LETTER
November 5, 2019      Mera Valdivia  [address] [facility] [id number]        Dear ,    We are writing to inform you of your test results.    The chemistry panel demonstrates an elevated blood sugar of 214.  Kidney function has improved over previous values.  It is now normal.   Hemoglobin A1c is steady at 7.7.  This is a little higher than what you would like suggesting there is some room for blood sugar improvement.       Feel free to contact me via the office or My Chart if you have any questions regarding the above.     Resulted Orders   Hemoglobin A1c   Result Value Ref Range    Hemoglobin A1C 7.7 (H) 0 - 5.6 %      Comment:      Normal <5.7% Prediabetes 5.7-6.4%  Diabetes 6.5% or higher - adopted from ADA   consensus guidelines.     Basic metabolic panel   Result Value Ref Range    Sodium 139 133 - 144 mmol/L    Potassium 4.3 3.4 - 5.3 mmol/L    Chloride 103 94 - 109 mmol/L    Carbon Dioxide 28 20 - 32 mmol/L    Anion Gap 8 3 - 14 mmol/L    Glucose 214 (H) 70 - 99 mg/dL    Urea Nitrogen 12 7 - 30 mg/dL    Creatinine 1.00 0.52 - 1.04 mg/dL    GFR Estimate 57 (L) >60 mL/min/[1.73_m2]      Comment:      Non  GFR Calc  Starting 12/18/2018, serum creatinine based estimated GFR (eGFR) will be   calculated using the Chronic Kidney Disease Epidemiology Collaboration   (CKD-EPI) equation.      GFR Estimate If Black 66 >60 mL/min/[1.73_m2]      Comment:       GFR Calc  Starting 12/18/2018, serum creatinine based estimated GFR (eGFR) will be   calculated using the Chronic Kidney Disease Epidemiology Collaboration   (CKD-EPI) equation.      Calcium 9.2 8.5 - 10.1 mg/dL       If you have any questions or concerns, please call the clinic at the number listed above.       Sincerely,        William Elizondo, DO

## 2019-11-01 NOTE — PATIENT INSTRUCTIONS
Patient Instructions:  ++++++++++++++++++++++++++++++++++++++++++++   I have asked the patient to :  Do not take aspirin 5 days prior to the surgery  Do not take lisinopril the morning of the surgery  [place]      Before Your Surgery      Call your surgeon if there is any change in your health. This includes signs of a cold or flu (such as a sore throat, runny nose, cough, rash or fever).    Do not smoke, drink alcohol or take over the counter medicine (unless your surgeon or primary care doctor tells you to) for the 24 hours before and after surgery.    If you take prescribed drugs: Follow your doctor s orders about which medicines to take and which to stop until after surgery.    Eating and drinking prior to surgery: follow the instructions from your surgeon    Take a shower or bath the night before surgery. Use the soap your surgeon gave you to gently clean your skin. If you do not have soap from your surgeon, use your regular soap. Do not shave or scrub the surgery site.  Wear clean pajamas and have clean sheets on your bed.

## 2019-11-05 NOTE — RESULT ENCOUNTER NOTE
Dear Mera, your recent test results are attached.    The chemistry panel demonstrates an elevated blood sugar of 214.  Kidney function has improved over previous values.  It is now normal.  Hemoglobin A1c is steady at 7.7.  This is a little higher than what you would like suggesting there is some room for blood sugar improvement.      Feel free to contact me via the office or My Chart if you have any questions regarding the above.

## 2019-11-15 DIAGNOSIS — E11.65 TYPE 2 DIABETES MELLITUS WITH HYPERGLYCEMIA, WITHOUT LONG-TERM CURRENT USE OF INSULIN (H): ICD-10-CM

## 2019-11-15 RX ORDER — LISINOPRIL 10 MG/1
TABLET ORAL
Qty: 30 TABLET | Refills: 0 | OUTPATIENT
Start: 2019-11-15

## 2019-11-15 RX ORDER — LISINOPRIL 10 MG/1
TABLET ORAL
Qty: 30 TABLET | Refills: 2 | Status: SHIPPED | OUTPATIENT
Start: 2019-11-15 | End: 2020-02-19

## 2019-11-15 NOTE — TELEPHONE ENCOUNTER
Prescription was sent 11/15/19 for #30 with 2 refills.  Pharmacy notified via E-Prescribe refusal.       Katina Henderson RN on 11/15/2019 at 2:57 PM

## 2019-11-15 NOTE — TELEPHONE ENCOUNTER
"Requested Prescriptions   Pending Prescriptions Disp Refills     lisinopril (PRINIVIL/ZESTRIL) 10 MG tablet [Pharmacy Med Name: LISINOPRIL 10MG TABS] 30 tablet 0     Sig: TAKE ONE TABLET BY MOUTH ONCE DAILY   Last Written Prescription Date:  10/10/19  Last Fill Quantity: 30,  # refills: 0   Last office visit: 11/1/2019 with prescribing provider:  11/1/19   Future Office Visit:        ACE Inhibitors (Including Combos) Protocol Passed - 11/15/2019  5:03 AM        Passed - Blood pressure under 140/90 in past 12 months     BP Readings from Last 3 Encounters:   11/01/19 134/82   02/12/19 118/72   06/22/18 143/83                 Passed - Recent (12 mo) or future (30 days) visit within the authorizing provider's specialty     Patient has had an office visit with the authorizing provider or a provider within the authorizing providers department within the previous 12 mos or has a future within next 30 days. See \"Patient Info\" tab in inbasket, or \"Choose Columns\" in Meds & Orders section of the refill encounter.              Passed - Medication is active on med list        Passed - Patient is age 18 or older        Passed - No active pregnancy on record        Passed - Normal serum creatinine on file in past 12 months     Recent Labs   Lab Test 11/01/19  1051   CR 1.00             Passed - Normal serum potassium on file in past 12 months     Recent Labs   Lab Test 11/01/19  1051   POTASSIUM 4.3             Passed - No positive pregnancy test within past 12 months        "

## 2019-11-15 NOTE — TELEPHONE ENCOUNTER
"Requested Prescriptions   Pending Prescriptions Disp Refills     lisinopril (PRINIVIL/ZESTRIL) 10 MG tablet [Pharmacy Med Name: LISINOPRIL 10MG TABS] 30 tablet 0     Sig: TAKE ONE TABLET BY MOUTH ONCE DAILY   Last Written Prescription Date:  11/15/19  Last Fill Quantity: 30,  # refills: 2   Last office visit: 11/1/2019 with prescribing provider:  Caro   Future Office Visit:        ACE Inhibitors (Including Combos) Protocol Passed - 11/15/2019  2:51 PM        Passed - Blood pressure under 140/90 in past 12 months     BP Readings from Last 3 Encounters:   11/01/19 134/82   02/12/19 118/72   06/22/18 143/83           Passed - Recent (12 mo) or future (30 days) visit within the authorizing provider's specialty     Patient has had an office visit with the authorizing provider or a provider within the authorizing providers department within the previous 12 mos or has a future within next 30 days. See \"Patient Info\" tab in inbasket, or \"Choose Columns\" in Meds & Orders section of the refill encounter.              Passed - Medication is active on med list        Passed - Patient is age 18 or older        Passed - No active pregnancy on record        Passed - Normal serum creatinine on file in past 12 months     Recent Labs   Lab Test 11/01/19  1051   CR 1.00           Passed - Normal serum potassium on file in past 12 months     Recent Labs   Lab Test 11/01/19  1051   POTASSIUM 4.3           Passed - No positive pregnancy test within past 12 months        "

## 2019-11-15 NOTE — TELEPHONE ENCOUNTER
Prescription approved per [facility] Refill Protocol.    DENNIS SilverioN, RN  [facility]

## 2019-11-16 DIAGNOSIS — E11.65 TYPE 2 DIABETES MELLITUS WITH HYPERGLYCEMIA, WITHOUT LONG-TERM CURRENT USE OF INSULIN (H): ICD-10-CM

## 2019-11-18 RX ORDER — LISINOPRIL 10 MG/1
TABLET ORAL
Qty: 30 TABLET | Refills: 0 | OUTPATIENT
Start: 2019-11-18

## 2019-11-28 DIAGNOSIS — E11.65 TYPE 2 DIABETES MELLITUS WITH HYPERGLYCEMIA, WITHOUT LONG-TERM CURRENT USE OF INSULIN (H): ICD-10-CM

## 2019-11-29 RX ORDER — METFORMIN HCL 500 MG
TABLET, EXTENDED RELEASE 24 HR ORAL
Qty: 90 TABLET | Refills: 3 | Status: SHIPPED | OUTPATIENT
Start: 2019-11-29 | End: 2020-03-17

## 2019-11-29 NOTE — TELEPHONE ENCOUNTER
"  Requested Prescriptions   Pending Prescriptions Disp Refills     metFORMIN (GLUCOPHAGE-XR) 500 MG 24 hr tablet [Pharmacy Med Name: METFORMIN HCL ER 500MG TB24] 90 tablet 1     Sig: TAKE 3 TABLETS ( 1,500 MG) BY MOUTH DAILY ( WITH DINNER)   Last Written Prescription Date:  10/2/2019  Last Fill Quantity: 90,  # refills: 0   Last office visit: 11/1/2019 with prescribing provider:     Future Office Visit:        Biguanide Agents Passed - 11/28/2019  5:02 AM        Passed - Blood pressure less than 140/90 in past 6 months     BP Readings from Last 3 Encounters:   11/01/19 134/82   02/12/19 118/72   06/22/18 143/83           Passed - Patient has documented LDL within the past 12 mos.     Recent Labs   Lab Test 02/12/19  1348   LDL 65           Passed - Patient has had a Microalbumin in the past 15 mos.     Recent Labs   Lab Test 02/12/19  1344   MICROL 27   UMALCR 11.74           Passed - Patient is age 10 or older        Passed - Patient has documented A1c within the specified period of time.     If HgbA1C is 8 or greater, it needs to be on file within the past 3 months.  If less than 8, must be on file within the past 6 months.     Recent Labs   Lab Test 11/01/19  1051   A1C 7.7*           Passed - Patient's CR is NOT>1.4 OR Patient's EGFR is NOT<45 within past 12 mos.     Recent Labs   Lab Test 11/01/19  1051   GFRESTIMATED 57*   GFRESTBLACK 66     Recent Labs   Lab Test 11/01/19  1051   CR 1.00           Passed - Patient does NOT have a diagnosis of CHF.        Passed - Medication is active on med list        Passed - Patient is not pregnant        Passed - Patient has not had a positive pregnancy test within the past 12 mos.         Passed - Recent (6 mo) or future (30 days) visit within the authorizing provider's specialty     Patient had office visit in the last 6 months or has a visit in the next 30 days with authorizing provider or within the authorizing provider's specialty.  See \"Patient Info\" tab in " "inbasket, or \"Choose Columns\" in Meds & Orders section of the refill encounter.          Prescription approved per [facility] Refill Protocol.  Mayda Molina RN    "

## 2019-12-01 DIAGNOSIS — E11.65 TYPE 2 DIABETES MELLITUS WITH HYPERGLYCEMIA, WITHOUT LONG-TERM CURRENT USE OF INSULIN (H): ICD-10-CM

## 2019-12-02 RX ORDER — METFORMIN HCL 500 MG
TABLET, EXTENDED RELEASE 24 HR ORAL
Qty: 90 TABLET | Refills: 1 | OUTPATIENT
Start: 2019-12-02

## 2019-12-02 NOTE — TELEPHONE ENCOUNTER
Prescription was sent 11/29/19 for #90 with 3 refills.  Pharmacy notified via E-Prescribe refusal.     DENNIS SilverioN, RN  [facility]

## 2020-02-17 DIAGNOSIS — E11.65 TYPE 2 DIABETES MELLITUS WITH HYPERGLYCEMIA, WITHOUT LONG-TERM CURRENT USE OF INSULIN (H): ICD-10-CM

## 2020-02-17 DIAGNOSIS — E78.5 HYPERLIPIDEMIA LDL GOAL <100: ICD-10-CM

## 2020-02-17 RX ORDER — SIMVASTATIN 10 MG
10 TABLET ORAL AT BEDTIME
Qty: 30 TABLET | Refills: 0 | Status: SHIPPED | OUTPATIENT
Start: 2020-02-17 | End: 2020-03-17

## 2020-02-17 NOTE — TELEPHONE ENCOUNTER
"  Requested Prescriptions   Pending Prescriptions Disp Refills     simvastatin (ZOCOR) 10 MG tablet [Pharmacy Med Name: SIMVASTATIN 10MG TABS] 30 tablet 3     Sig: TAKE ONE TABLET BY MOUTH EVERY NIGHT AT BEDTIME   Last Written Prescription Date:  10/10/2019  Last Fill Quantity: 30,  # refills: 3   Last office visit: 11/1/2019 with prescribing provider:     Future Office Visit:        Statins Protocol Failed - 2/17/2020  9:38 AM        Failed - LDL on file in past 12 months     Recent Labs   Lab Test 02/12/19  1348   LDL 65           Passed - No abnormal creatine kinase in past 12 months     No lab results found.           Passed - Recent (12 mo) or future (30 days) visit within the authorizing provider's specialty     Patient has had an office visit with the authorizing provider or a provider within the authorizing providers department within the previous 12 mos or has a future within next 30 days. See \"Patient Info\" tab in inbasket, or \"Choose Columns\" in Meds & Orders section of the refill encounter.            Passed - Medication is active on med list        Passed - Patient is age 18 or older        Passed - No active pregnancy on record        Passed - No positive pregnancy test in past 12 months      Medication is being filled for 1 time refill only due to:  Patient needs labs various including LDL.     Mayda Molina RN        "

## 2020-02-19 DIAGNOSIS — E11.65 TYPE 2 DIABETES MELLITUS WITH HYPERGLYCEMIA, WITHOUT LONG-TERM CURRENT USE OF INSULIN (H): ICD-10-CM

## 2020-02-19 RX ORDER — LISINOPRIL 10 MG/1
TABLET ORAL
Qty: 30 TABLET | Refills: 2 | Status: SHIPPED | OUTPATIENT
Start: 2020-02-19 | End: 2020-05-15

## 2020-02-19 NOTE — TELEPHONE ENCOUNTER
Prescription approved per [facility] Refill Protocol.    Katina Henderson RN on 2/19/2020 at 3:17 PM

## 2020-02-19 NOTE — TELEPHONE ENCOUNTER
"Requested Prescriptions   Pending Prescriptions Disp Refills     LISINOPRIL 10 MG PO tablet [Pharmacy Med Name: LISINOPRIL 10MG TABS] 30 tablet 2     Sig: TAKE ONE TABLET BY MOUTH ONCE DAILY   Last Written Prescription Date:  11/15/19  Last Fill Quantity: 30,  # refills: 2   Last office visit: 11/1/2019 with prescribing provider:  Caro   Future Office Visit:        ACE Inhibitors (Including Combos) Protocol Passed - 2/19/2020 12:44 PM        Passed - Blood pressure under 140/90 in past 12 months     BP Readings from Last 3 Encounters:   11/01/19 134/82   02/12/19 118/72   06/22/18 143/83           Passed - Recent (12 mo) or future (30 days) visit within the authorizing provider's specialty     Patient has had an office visit with the authorizing provider or a provider within the authorizing providers department within the previous 12 mos or has a future within next 30 days. See \"Patient Info\" tab in inbasket, or \"Choose Columns\" in Meds & Orders section of the refill encounter.              Passed - Medication is active on med list        Passed - Patient is age 18 or older        Passed - No active pregnancy on record        Passed - Normal serum creatinine on file in past 12 months     Recent Labs   Lab Test 11/01/19  1051   CR 1.00             Passed - Normal serum potassium on file in past 12 months     Recent Labs   Lab Test 11/01/19  1051   POTASSIUM 4.3             Passed - No positive pregnancy test within past 12 months          "

## 2020-02-20 DIAGNOSIS — E78.5 HYPERLIPIDEMIA LDL GOAL <100: ICD-10-CM

## 2020-02-20 RX ORDER — AMLODIPINE BESYLATE 5 MG/1
TABLET ORAL
Qty: 90 TABLET | Refills: 0 | Status: SHIPPED | OUTPATIENT
Start: 2020-02-20 | End: 2020-03-09

## 2020-02-20 NOTE — TELEPHONE ENCOUNTER
Prescription approved per [facility] Refill Protocol.    DENNIS SilverioN, RN  [facility]

## 2020-02-20 NOTE — TELEPHONE ENCOUNTER
"Requested Prescriptions   Pending Prescriptions Disp Refills     AMLODIPINE 5 MG PO tablet [Pharmacy Med Name: AMLODIPINE BESYLATE 5MG TABS] 90 tablet 3     Sig: TAKE ONE TABLET BY MOUTH EVERY DAY   Last Written Prescription Date:  3/15/19  Last Fill Quantity: 90,  # refills: 3   Last office visit: 5/31/2018 with prescribing provider:  11/1/19   Future Office Visit:        Calcium Channel Blockers Protocol  Passed - 2/20/2020  2:02 PM        Passed - Blood pressure under 140/90 in past 12 months     BP Readings from Last 3 Encounters:   11/01/19 134/82   02/12/19 118/72   06/22/18 143/83                 Passed - Recent (12 mo) or future (30 days) visit within the authorizing provider's specialty     Patient has had an office visit with the authorizing provider or a provider within the authorizing providers department within the previous 12 mos or has a future within next 30 days. See \"Patient Info\" tab in inbasket, or \"Choose Columns\" in Meds & Orders section of the refill encounter.              Passed - Medication is active on med list        Passed - Patient is age 18 or older        Passed - No active pregnancy on record        Passed - Normal serum creatinine on file in past 12 months     Recent Labs   Lab Test 11/01/19  1051   CR 1.00             Passed - No positive pregnancy test in past 12 months        "

## 2020-03-02 ENCOUNTER — TELEPHONE (OUTPATIENT)
Dept: FAMILY MEDICINE | Facility: OTHER | Age: 70
End: 2020-03-02

## 2020-03-02 DIAGNOSIS — E78.5 HYPERLIPIDEMIA LDL GOAL <100: Primary | ICD-10-CM

## 2020-03-02 DIAGNOSIS — E78.5 HYPERLIPIDEMIA LDL GOAL <100: ICD-10-CM

## 2020-03-02 LAB
CHOLEST SERPL-MCNC: 163 MG/DL
HDLC SERPL-MCNC: 40 MG/DL
LDLC SERPL CALC-MCNC: ABNORMAL MG/DL
NONHDLC SERPL-MCNC: 123 MG/DL
TRIGL SERPL-MCNC: 416 MG/DL

## 2020-03-02 PROCEDURE — 80061 LIPID PANEL: CPT | Performed by: INTERNAL MEDICINE

## 2020-03-02 PROCEDURE — 36415 COLL VENOUS BLD VENIPUNCTURE: CPT | Performed by: INTERNAL MEDICINE

## 2020-03-02 NOTE — TELEPHONE ENCOUNTER
Patient is at [facility] wanting her labs done for Zocor per note on bottle.     She is not fasting. [facility] would like a call back when labs are ordered and if patient should wait until she is fasting?     Dang Wright LPN........3/2/2020 1:13 PM

## 2020-03-03 NOTE — RESULT ENCOUNTER NOTE
Dear Mera, your recent test results are attached.  Triglycerides were elevated prohibiting the ability to measure the cholesterol.  Would recommend repeat of the lipid panel with a more aggressive fasting.    You will be contacted with any outstanding results as they are available.  Feel free to contact me via the office or My Chart if you have any questions regarding the above.

## 2020-03-09 DIAGNOSIS — E78.5 HYPERLIPIDEMIA LDL GOAL <100: ICD-10-CM

## 2020-03-09 RX ORDER — AMLODIPINE BESYLATE 5 MG/1
TABLET ORAL
Qty: 90 TABLET | Refills: 1 | Status: SHIPPED | OUTPATIENT
Start: 2020-03-09 | End: 2020-09-10

## 2020-03-09 NOTE — TELEPHONE ENCOUNTER
"  Requested Prescriptions   Pending Prescriptions Disp Refills     amLODIPine (NORVASC) 5 MG tablet [Pharmacy Med Name: AMLODIPINE BESYLATE 5MG TABS] 90 tablet 3     Sig: TAKE ONE TABLET BY MOUTH EVERY DAY   Last Written Prescription Date:  2/20/2020  Last Fill Quantity: 90,  # refills: 0   Last office visit: 11/1/2019 with prescribing provider:     Future Office Visit:        Calcium Channel Blockers Protocol  Passed - 3/9/2020  5:01 AM        Passed - Blood pressure under 140/90 in past 12 months     BP Readings from Last 3 Encounters:   11/01/19 134/82   02/12/19 118/72   06/22/18 143/83           Passed - Recent (12 mo) or future (30 days) visit within the authorizing provider's specialty     Patient has had an office visit with the authorizing provider or a provider within the authorizing providers department within the previous 12 mos or has a future within next 30 days. See \"Patient Info\" tab in inbasket, or \"Choose Columns\" in Meds & Orders section of the refill encounter.            Passed - Medication is active on med list        Passed - Patient is age 18 or older        Passed - No active pregnancy on record        Passed - Normal serum creatinine on file in past 12 months     Recent Labs   Lab Test 11/01/19  1051   CR 1.00           Passed - No positive pregnancy test in past 12 months         Prescription approved per [facility] Refill Protocol.  Mayda Molina RN      "

## 2020-03-10 DIAGNOSIS — E78.5 HYPERLIPIDEMIA LDL GOAL <100: ICD-10-CM

## 2020-03-10 RX ORDER — AMLODIPINE BESYLATE 5 MG/1
TABLET ORAL
Qty: 90 TABLET | Refills: 3 | OUTPATIENT
Start: 2020-03-10

## 2020-03-10 NOTE — TELEPHONE ENCOUNTER
Prescription was sent 3/9/2020 for #90 with 1 refill.  Pharmacy notified via E-Prescribe refusal.     DENNIS SilverioN, RN  [facility]

## 2020-03-10 NOTE — TELEPHONE ENCOUNTER
"Requested Prescriptions   Pending Prescriptions Disp Refills     amLODIPine (NORVASC) 5 MG tablet [Pharmacy Med Name: AMLODIPINE BESYLATE 5MG TABS] 90 tablet 3     Sig: TAKE ONE TABLET BY MOUTH EVERY DAY   Last Written Prescription Date:  3/9/2020  Last Fill Quantity: 90,  # refills: 1   Last office visit: 5/31/2018 with prescribing provider:  11/1/19   Future Office Visit:        Calcium Channel Blockers Protocol  Passed - 3/10/2020  5:01 AM        Passed - Blood pressure under 140/90 in past 12 months     BP Readings from Last 3 Encounters:   11/01/19 134/82   02/12/19 118/72   06/22/18 143/83                 Passed - Recent (12 mo) or future (30 days) visit within the authorizing provider's specialty     Patient has had an office visit with the authorizing provider or a provider within the authorizing providers department within the previous 12 mos or has a future within next 30 days. See \"Patient Info\" tab in inbasket, or \"Choose Columns\" in Meds & Orders section of the refill encounter.              Passed - Medication is active on med list        Passed - Patient is age 18 or older        Passed - No active pregnancy on record        Passed - Normal serum creatinine on file in past 12 months     Recent Labs   Lab Test 11/01/19  1051   CR 1.00             Passed - No positive pregnancy test in past 12 months           "

## 2020-03-11 ENCOUNTER — TELEPHONE (OUTPATIENT)
Dept: FAMILY MEDICINE | Facility: OTHER | Age: 70
End: 2020-03-11

## 2020-03-11 DIAGNOSIS — E78.5 HYPERLIPIDEMIA LDL GOAL <100: Primary | ICD-10-CM

## 2020-03-11 DIAGNOSIS — E78.5 HYPERLIPIDEMIA LDL GOAL <100: ICD-10-CM

## 2020-03-11 RX ORDER — AMLODIPINE BESYLATE 5 MG/1
TABLET ORAL
Qty: 90 TABLET | Refills: 3 | OUTPATIENT
Start: 2020-03-11

## 2020-03-11 NOTE — TELEPHONE ENCOUNTER
Reason for Call: Request for an order or referral:    Order or referral being requested: Orders to repeat lipid panel, please advise.     Date needed: as soon as possible    Has the patient been seen by the PCP for this problem? YES    Additional comments:     Phone number Patient can be reached at:  Home number on file [phone] (home)    Best Time:      Can we leave a detailed message on this number?  YES    Call taken on 3/11/2020 at 1:43 PM by Natalia Ruiz

## 2020-03-11 NOTE — TELEPHONE ENCOUNTER
"Requested Prescriptions   Pending Prescriptions Disp Refills     amLODIPine (NORVASC) 5 MG tablet [Pharmacy Med Name: AMLODIPINE BESYLATE 5MG TABS] 90 tablet 3     Sig: TAKE ONE TABLET BY MOUTH EVERY DAY   Last Written Prescription Date:  3/9/2020  Last Fill Quantity: 90,  # refills: 1   Last office visit: 5/31/2018 with prescribing provider:  11/1/19   Future Office Visit:        Calcium Channel Blockers Protocol  Passed - 3/11/2020  5:01 AM        Passed - Blood pressure under 140/90 in past 12 months     BP Readings from Last 3 Encounters:   11/01/19 134/82   02/12/19 118/72   06/22/18 143/83                 Passed - Recent (12 mo) or future (30 days) visit within the authorizing provider's specialty     Patient has had an office visit with the authorizing provider or a provider within the authorizing providers department within the previous 12 mos or has a future within next 30 days. See \"Patient Info\" tab in inbasket, or \"Choose Columns\" in Meds & Orders section of the refill encounter.              Passed - Medication is active on med list        Passed - Patient is age 18 or older        Passed - No active pregnancy on record        Passed - Normal serum creatinine on file in past 12 months     Recent Labs   Lab Test 11/01/19  1051   CR 1.00             Passed - No positive pregnancy test in past 12 months           "

## 2020-03-11 NOTE — TELEPHONE ENCOUNTER
Prescription was sent 3/9/2020 for #90 with 1 refill.  Pharmacy notified via E-Prescribe refusal.     DENNIS SilverioN, RN  [facility]

## 2020-03-11 NOTE — TELEPHONE ENCOUNTER
Spoke with patient and informed of message below. Patient understood. Appointment for lab made.     Jacquelyn Nicole MA

## 2020-03-12 DIAGNOSIS — E78.5 HYPERLIPIDEMIA LDL GOAL <100: ICD-10-CM

## 2020-03-13 RX ORDER — AMLODIPINE BESYLATE 5 MG/1
TABLET ORAL
Qty: 90 TABLET | Refills: 3 | OUTPATIENT
Start: 2020-03-13

## 2020-03-13 NOTE — TELEPHONE ENCOUNTER
"Requested Prescriptions   Pending Prescriptions Disp Refills     amLODIPine (NORVASC) 5 MG tablet [Pharmacy Med Name: AMLODIPINE BESYLATE 5MG TABS] 90 tablet 3     Sig: TAKE ONE TABLET BY MOUTH EVERY DAY   Last Written Prescription Date:  03/09/2020  Last Fill Quantity: 90,  # refills: 1   Last office visit: 5/31/2018 with prescribing provider:  11/01/2019   Future Office Visit:  Was just filled     Calcium Channel Blockers Protocol  Passed - 3/12/2020  5:01 AM        Passed - Blood pressure under 140/90 in past 12 months     BP Readings from Last 3 Encounters:   11/01/19 134/82   02/12/19 118/72   06/22/18 143/83                 Passed - Recent (12 mo) or future (30 days) visit within the authorizing provider's specialty     Patient has had an office visit with the authorizing provider or a provider within the authorizing providers department within the previous 12 mos or has a future within next 30 days. See \"Patient Info\" tab in inbasket, or \"Choose Columns\" in Meds & Orders section of the refill encounter.              Passed - Medication is active on med list        Passed - Patient is age 18 or older        Passed - No active pregnancy on record        Passed - Normal serum creatinine on file in past 12 months     Recent Labs   Lab Test 11/01/19  1051   CR 1.00       Ok to refill medication if creatinine is low          Passed - No positive pregnancy test in past 12 months             "

## 2020-03-13 NOTE — TELEPHONE ENCOUNTER
Prescription was sent 3/9/2020 for #90 with 1 refills.  Pharmacy notified via E-Prescribe refusal.     DENNIS SilverioN, RN  [facility]

## 2020-03-16 DIAGNOSIS — E11.65 TYPE 2 DIABETES MELLITUS WITH HYPERGLYCEMIA, WITHOUT LONG-TERM CURRENT USE OF INSULIN (H): ICD-10-CM

## 2020-03-16 DIAGNOSIS — E78.5 HYPERLIPIDEMIA LDL GOAL <100: ICD-10-CM

## 2020-03-17 RX ORDER — SIMVASTATIN 10 MG
TABLET ORAL
Qty: 90 TABLET | Refills: 1 | Status: SHIPPED | OUTPATIENT
Start: 2020-03-17 | End: 2020-09-15

## 2020-03-17 RX ORDER — METFORMIN HCL 500 MG
TABLET, EXTENDED RELEASE 24 HR ORAL
Qty: 270 TABLET | Refills: 1 | Status: SHIPPED | OUTPATIENT
Start: 2020-03-17 | End: 2020-09-30

## 2020-03-17 NOTE — TELEPHONE ENCOUNTER
Routing refill request to provider for review/approval because:  Labs out of range:  GFR, LDL    DENNIS SilverioN, RN  [facility]

## 2020-03-17 NOTE — TELEPHONE ENCOUNTER
"Requested Prescriptions   Pending Prescriptions Disp Refills     metFORMIN (GLUCOPHAGE-XR) 500 MG 24 hr tablet [Pharmacy Med Name: METFORMIN HCL ER 500MG TB24] 90 tablet 3     Sig: TAKE 3 TABLETS ( 1,500 MG) BY MOUTH DAILY ( WITH DINNER)       Biguanide Agents Passed - 3/16/2020 11:52 AM        Passed - Blood pressure less than 140/90 in past 6 months     BP Readings from Last 3 Encounters:   11/01/19 134/82   02/12/19 118/72   06/22/18 143/83                 Passed - Patient has documented LDL within the past 12 mos.     Recent Labs   Lab Test 03/02/20  1323   LDL Cannot estimate LDL when triglyceride exceeds 400 mg/dL             Passed - Patient has had a Microalbumin in the past 15 mos.     Recent Labs   Lab Test 02/12/19  1344   MICROL 27   UMALCR 11.74             Passed - Patient is age 10 or older        Passed - Patient has documented A1c within the specified period of time.     If HgbA1C is 8 or greater, it needs to be on file within the past 3 months.  If less than 8, must be on file within the past 6 months.     Recent Labs   Lab Test 11/01/19  1051   A1C 7.7*             Passed - Patient's CR is NOT>1.4 OR Patient's EGFR is NOT<45 within past 12 mos.     Recent Labs   Lab Test 11/01/19  1051   GFRESTIMATED 57*   GFRESTBLACK 66       Recent Labs   Lab Test 11/01/19  1051   CR 1.00             Passed - Patient does NOT have a diagnosis of CHF.        Passed - Medication is active on med list        Passed - Patient is not pregnant        Passed - Patient has not had a positive pregnancy test within the past 12 mos.         Passed - Recent (6 mo) or future (30 days) visit within the authorizing provider's specialty     Patient had office visit in the last 6 months or has a visit in the next 30 days with authorizing provider or within the authorizing provider's specialty.  See \"Patient Info\" tab in inbasket, or \"Choose Columns\" in Meds & Orders section of the refill encounter.               simvastatin " "(ZOCOR) 10 MG tablet [Pharmacy Med Name: SIMVASTATIN 10MG TABS] 30 tablet 0     Sig: TAKE ONE TABLET BY MOUTH AT BEDTIME *LABS DUE*       Statins Protocol Passed - 3/16/2020 11:52 AM        Passed - LDL on file in past 12 months     Recent Labs   Lab Test 03/02/20  1323   LDL Cannot estimate LDL when triglyceride exceeds 400 mg/dL             Passed - No abnormal creatine kinase in past 12 months     No lab results found.             Passed - Recent (12 mo) or future (30 days) visit within the authorizing provider's specialty     Patient has had an office visit with the authorizing provider or a provider within the authorizing providers department within the previous 12 mos or has a future within next 30 days. See \"Patient Info\" tab in inbasket, or \"Choose Columns\" in Meds & Orders section of the refill encounter.              Passed - Medication is active on med list        Passed - Patient is age 18 or older        Passed - No active pregnancy on record        Passed - No positive pregnancy test in past 12 months             "

## 2020-05-14 DIAGNOSIS — E11.65 TYPE 2 DIABETES MELLITUS WITH HYPERGLYCEMIA, WITHOUT LONG-TERM CURRENT USE OF INSULIN (H): ICD-10-CM

## 2020-05-15 RX ORDER — LISINOPRIL 10 MG/1
TABLET ORAL
Qty: 90 TABLET | Refills: 0 | Status: SHIPPED | OUTPATIENT
Start: 2020-05-15 | End: 2020-08-13

## 2020-05-15 NOTE — TELEPHONE ENCOUNTER
Prescription approved per [facility] Refill Protocol.    DENNIS SilverioN, RN  [facility]

## 2020-05-18 DIAGNOSIS — E11.65 TYPE 2 DIABETES MELLITUS WITH HYPERGLYCEMIA, WITHOUT LONG-TERM CURRENT USE OF INSULIN (H): ICD-10-CM

## 2020-05-19 RX ORDER — LISINOPRIL 10 MG/1
TABLET ORAL
Qty: 30 TABLET | Refills: 2 | OUTPATIENT
Start: 2020-05-19

## 2020-05-19 NOTE — TELEPHONE ENCOUNTER
Prescription was sent 5/15/2020 for #90 with 0 refills.  Pharmacy notified via E-Prescribe refusal.     DENNIS SilverioN, RN  [facility]

## 2020-08-13 DIAGNOSIS — E11.65 TYPE 2 DIABETES MELLITUS WITH HYPERGLYCEMIA, WITHOUT LONG-TERM CURRENT USE OF INSULIN (H): ICD-10-CM

## 2020-08-13 RX ORDER — LISINOPRIL 10 MG/1
TABLET ORAL
Qty: 90 TABLET | Refills: 0 | Status: SHIPPED | OUTPATIENT
Start: 2020-08-13 | End: 2020-11-12

## 2020-09-09 DIAGNOSIS — E78.5 HYPERLIPIDEMIA LDL GOAL <100: ICD-10-CM

## 2020-09-10 RX ORDER — AMLODIPINE BESYLATE 5 MG/1
TABLET ORAL
Qty: 90 TABLET | Refills: 0 | Status: SHIPPED | OUTPATIENT
Start: 2020-09-10 | End: 2020-12-01

## 2020-09-10 NOTE — TELEPHONE ENCOUNTER
"Prescription approved per RN refill protocol.    Norvasc  Last Written Prescription Date:  3/9/2020  Last Fill Quantity: 90,  # refills: 1   Last office visit: 11/1/2019 with prescribing provider:  Caro   Future Office Visit:      Requested Prescriptions   Pending Prescriptions Disp Refills     amLODIPine (NORVASC) 5 MG tablet [Pharmacy Med Name: AMLODIPINE BESYLATE 5MG TABS] 90 tablet 1     Sig: TAKE ONE TABLET BY MOUTH EVERY DAY       Calcium Channel Blockers Protocol  Passed - 9/9/2020 10:21 AM        Passed - Blood pressure under 140/90 in past 12 months     BP Readings from Last 3 Encounters:   11/01/19 134/82   02/12/19 118/72   06/22/18 143/83                 Passed - Recent (12 mo) or future (30 days) visit within the authorizing provider's specialty     Patient has had an office visit with the authorizing provider or a provider within the authorizing providers department within the previous 12 mos or has a future within next 30 days. See \"Patient Info\" tab in inbasket, or \"Choose Columns\" in Meds & Orders section of the refill encounter.              Passed - Medication is active on med list        Passed - Patient is age 18 or older        Passed - No active pregnancy on record        Passed - Normal serum creatinine on file in past 12 months     Recent Labs   Lab Test 11/01/19  1051   CR 1.00       Ok to refill medication if creatinine is low          Passed - No positive pregnancy test in past 12 months           Acacia Coronado RN on 9/10/2020 at 4:03 PM    "

## 2020-09-14 DIAGNOSIS — E11.65 TYPE 2 DIABETES MELLITUS WITH HYPERGLYCEMIA, WITHOUT LONG-TERM CURRENT USE OF INSULIN (H): ICD-10-CM

## 2020-09-14 DIAGNOSIS — E78.5 HYPERLIPIDEMIA LDL GOAL <100: ICD-10-CM

## 2020-09-15 RX ORDER — SIMVASTATIN 10 MG
10 TABLET ORAL AT BEDTIME
Qty: 90 TABLET | Refills: 0 | Status: SHIPPED | OUTPATIENT
Start: 2020-09-15 | End: 2020-12-01

## 2020-09-15 NOTE — TELEPHONE ENCOUNTER
Routing to schedulers to set up F2F appointment for patient for yearly.  Patient has been given #90 to get to appointment per triage protocol.  Radha Jimenez, DENNISN, RN

## 2020-09-22 ENCOUNTER — OFFICE VISIT (OUTPATIENT)
Dept: INTERNAL MEDICINE | Facility: CLINIC | Age: 70
End: 2020-09-22
Payer: COMMERCIAL

## 2020-09-22 VITALS
HEART RATE: 91 BPM | SYSTOLIC BLOOD PRESSURE: 124 MMHG | WEIGHT: 174 LBS | TEMPERATURE: 97.4 F | HEIGHT: 64 IN | DIASTOLIC BLOOD PRESSURE: 72 MMHG | OXYGEN SATURATION: 99 % | BODY MASS INDEX: 29.71 KG/M2 | RESPIRATION RATE: 16 BRPM

## 2020-09-22 DIAGNOSIS — Z12.11 SCREEN FOR COLON CANCER: ICD-10-CM

## 2020-09-22 DIAGNOSIS — Z12.31 ENCOUNTER FOR SCREENING MAMMOGRAM FOR BREAST CANCER: ICD-10-CM

## 2020-09-22 DIAGNOSIS — N30.00 ACUTE CYSTITIS WITHOUT HEMATURIA: ICD-10-CM

## 2020-09-22 DIAGNOSIS — N18.30 CKD (CHRONIC KIDNEY DISEASE) STAGE 3, GFR 30-59 ML/MIN (H): ICD-10-CM

## 2020-09-22 DIAGNOSIS — Z00.00 MEDICARE ANNUAL WELLNESS VISIT, SUBSEQUENT: ICD-10-CM

## 2020-09-22 DIAGNOSIS — I10 BENIGN ESSENTIAL HYPERTENSION: Primary | ICD-10-CM

## 2020-09-22 DIAGNOSIS — Z23 NEED FOR PROPHYLACTIC VACCINATION AND INOCULATION AGAINST INFLUENZA: ICD-10-CM

## 2020-09-22 DIAGNOSIS — E78.2 ELEVATED TRIGLYCERIDES WITH HIGH CHOLESTEROL: ICD-10-CM

## 2020-09-22 DIAGNOSIS — E11.65 TYPE 2 DIABETES MELLITUS WITH HYPERGLYCEMIA, WITHOUT LONG-TERM CURRENT USE OF INSULIN (H): ICD-10-CM

## 2020-09-22 LAB
ALBUMIN UR-MCNC: NEGATIVE MG/DL
ANION GAP SERPL CALCULATED.3IONS-SCNC: 4 MMOL/L (ref 3–14)
APPEARANCE UR: ABNORMAL
BACTERIA #/AREA URNS HPF: ABNORMAL /HPF
BILIRUB UR QL STRIP: NEGATIVE
BUN SERPL-MCNC: 22 MG/DL (ref 7–30)
CALCIUM SERPL-MCNC: 10.3 MG/DL (ref 8.5–10.1)
CHLORIDE SERPL-SCNC: 104 MMOL/L (ref 94–109)
CHOLEST SERPL-MCNC: 161 MG/DL
CO2 SERPL-SCNC: 29 MMOL/L (ref 20–32)
COLOR UR AUTO: YELLOW
CREAT SERPL-MCNC: 1.22 MG/DL (ref 0.52–1.04)
CREAT UR-MCNC: 202 MG/DL
GFR SERPL CREATININE-BSD FRML MDRD: 44 ML/MIN/{1.73_M2}
GLUCOSE SERPL-MCNC: 161 MG/DL (ref 70–99)
GLUCOSE UR STRIP-MCNC: NEGATIVE MG/DL
HBA1C MFR BLD: 6.8 % (ref 0–5.6)
HDLC SERPL-MCNC: 48 MG/DL
HGB UR QL STRIP: NEGATIVE
KETONES UR STRIP-MCNC: NEGATIVE MG/DL
LDLC SERPL CALC-MCNC: 75 MG/DL
LEUKOCYTE ESTERASE UR QL STRIP: ABNORMAL
MICROALBUMIN UR-MCNC: 29 MG/L
MICROALBUMIN/CREAT UR: 14.5 MG/G CR (ref 0–25)
MUCOUS THREADS #/AREA URNS LPF: PRESENT /LPF
NITRATE UR QL: POSITIVE
NONHDLC SERPL-MCNC: 113 MG/DL
PH UR STRIP: 5 PH (ref 5–7)
POTASSIUM SERPL-SCNC: 5 MMOL/L (ref 3.4–5.3)
RBC #/AREA URNS AUTO: 15 /HPF (ref 0–2)
SODIUM SERPL-SCNC: 137 MMOL/L (ref 133–144)
SOURCE: ABNORMAL
SP GR UR STRIP: 1.01 (ref 1–1.03)
SQUAMOUS #/AREA URNS AUTO: 24 /HPF (ref 0–1)
TRIGL SERPL-MCNC: 191 MG/DL
UROBILINOGEN UR STRIP-MCNC: 0 MG/DL (ref 0–2)
WBC #/AREA URNS AUTO: 89 /HPF (ref 0–5)
WBC CLUMPS #/AREA URNS HPF: PRESENT /HPF

## 2020-09-22 PROCEDURE — 87088 URINE BACTERIA CULTURE: CPT | Performed by: INTERNAL MEDICINE

## 2020-09-22 PROCEDURE — 83036 HEMOGLOBIN GLYCOSYLATED A1C: CPT | Performed by: INTERNAL MEDICINE

## 2020-09-22 PROCEDURE — 82043 UR ALBUMIN QUANTITATIVE: CPT | Performed by: INTERNAL MEDICINE

## 2020-09-22 PROCEDURE — 80048 BASIC METABOLIC PNL TOTAL CA: CPT | Performed by: INTERNAL MEDICINE

## 2020-09-22 PROCEDURE — 90662 IIV NO PRSV INCREASED AG IM: CPT | Performed by: INTERNAL MEDICINE

## 2020-09-22 PROCEDURE — 99397 PER PM REEVAL EST PAT 65+ YR: CPT | Mod: 25 | Performed by: INTERNAL MEDICINE

## 2020-09-22 PROCEDURE — 87186 SC STD MICRODIL/AGAR DIL: CPT | Performed by: INTERNAL MEDICINE

## 2020-09-22 PROCEDURE — G0008 ADMIN INFLUENZA VIRUS VAC: HCPCS | Performed by: INTERNAL MEDICINE

## 2020-09-22 PROCEDURE — 36415 COLL VENOUS BLD VENIPUNCTURE: CPT | Performed by: INTERNAL MEDICINE

## 2020-09-22 PROCEDURE — 99213 OFFICE O/P EST LOW 20 MIN: CPT | Mod: 25 | Performed by: INTERNAL MEDICINE

## 2020-09-22 PROCEDURE — 87086 URINE CULTURE/COLONY COUNT: CPT | Performed by: INTERNAL MEDICINE

## 2020-09-22 PROCEDURE — 80061 LIPID PANEL: CPT | Performed by: INTERNAL MEDICINE

## 2020-09-22 PROCEDURE — 81001 URINALYSIS AUTO W/SCOPE: CPT | Performed by: INTERNAL MEDICINE

## 2020-09-22 ASSESSMENT — PAIN SCALES - GENERAL: PAINLEVEL: NO PAIN (0)

## 2020-09-22 ASSESSMENT — MIFFLIN-ST. JEOR: SCORE: 1289.26

## 2020-09-22 NOTE — PROGRESS NOTES
Subjective     Mera Valdivia is a 70 year old female who presents to clinic today for the following health issues:    HPI       Diabetes Follow-up      How often are you checking your blood sugar? Not at all    What concerns do you have today about your diabetes? None     Do you have any of these symptoms? (Select all that apply)  Numbness in feet    Have you had a diabetic eye exam in the last 12 months? Had cataract surgery at the beginning of the year        BP Readings from Last 2 Encounters:   09/22/20 124/72   11/01/19 134/82     Hemoglobin A1C (%)   Date Value   11/01/2019 7.7 (H)   02/12/2019 7.7 (H)     LDL Cholesterol Calculated (mg/dL)   Date Value   03/02/2020     Cannot estimate LDL when triglyceride exceeds 400 mg/dL   02/12/2019 65       Patient has history of type 2 diabetes.  Notes that she has been taking her medication compliantly.  She denies any side effects or complications associated with the metformin.  She denies diarrhea at this time.  Her most recent glycosylated hemoglobin was 7.7 back in November of last year.  She states that she has now started a low carbohydrate high protein diet.  She is had no hypoglycemia as result of this.  She has lost only 5 pounds but has only recently started.  She denies any polyuria or polydipsia, she has occasional paresthesias of the feet which are modest.  She is appropriately on statin as well as ACE inhibitor and aspirin.  Has occasional bruising on the dorsal aspect of the forearms from the aspirin but this is not unexpected.      Annual Wellness Visit    Patient has been advised of split billing requirements and indicates understanding: Yes     Are you in the first 12 months of your Medicare Part B coverage?  No    Physical Health:    In general, how would you rate your overall physical health? excellent    Outside of work, how many days during the week do you exercise?2-3 days/week    Outside of work, approximately how many minutes a day do you  "exercise?less than 15 minutes    If you drink alcohol do you typically have >3 drinks per day or >7 drinks per week? No    Do you usually eat at least 4 servings of fruit and vegetables a day, include whole grains & fiber and avoid regularly eating high fat or \"junk\" foods? Yes    Do you have any problems taking medications regularly? No    Do you have any side effects from medications? none    Needs assistance for the following daily activities: no assistance needed    Which of the following safety concerns are present in your home?  none identified     Hearing impairment: No    In the past 6 months, have you been bothered by leaking of urine? no    Mental Health:    In general, how would you rate your overall mental or emotional health? excellent  PHQ-2 Score:      Do you feel safe in your environment? Yes    Have you ever done Advance Care Planning? (For example, a Health Directive, POLST, or a discussion with a medical provider or your loved ones about your wishes)? No, advance care planning information given to patient to review.  Patient plans to discuss their wishes with loved ones or provider.      Fall risk:       Cognitive Screenin) Repeat 3 items (Leader, Season, Table)    2) Clock draw: NORMAL  3) 3 item recall: Recalls 3 objects  Results: 3 items recalled: COGNITIVE IMPAIRMENT LESS LIKELY    Mini-CogTM Copyright S Jorge. Licensed by the author for use in [facility]; reprinted with permission (lory@.[facility]). All rights reserved.      Do you have sleep apnea, excessive snoring or daytime drowsiness?: no    Current providers sharing in care for this patient include:   Patient Care Team:  William Elizondo DO as PCP - General (Internal Medicine)  William Elizondo DO as Assigned PCP    Patient has been advised of split billing requirements and indicates understanding: Yes    Review of Systems   CONSTITUTIONAL: NEGATIVE for fever, chills, change in " "weight  INTEGUMENTARY/SKIN: NEGATIVE for worrisome rashes, moles or lesions  EYES: NEGATIVE for vision changes or irritation  ENT/MOUTH: NEGATIVE for ear, mouth and throat problems  RESP: NEGATIVE for significant cough or SOB  CV: NEGATIVE for chest pain, palpitations or peripheral edema  GI: NEGATIVE for nausea, abdominal pain, heartburn, or change in bowel habits  : NEGATIVE for frequency, dysuria, or hematuria  MUSCULOSKELETAL: NEGATIVE for significant arthralgias or myalgia  NEURO: NEGATIVE for weakness, dizziness or paresthesias  ENDOCRINE: NEGATIVE for temperature intolerance, skin/hair changes  HEME: NEGATIVE for bleeding problems  PSYCHIATRIC: NEGATIVE for changes in mood or affect      Objective    /72 (Cuff Size: Adult Regular)   Pulse 91   Temp 97.4  F (36.3  C) (Temporal)   Resp 16   Ht 1.626 m (5' 4\")   Wt 78.9 kg (174 lb)   SpO2 99%   BMI 29.87 kg/m    Body mass index is 29.87 kg/m .  Physical Exam   GENERAL: healthy, alert and no distress  EYES: Eyes grossly normal to inspection, PERRL and conjunctivae and sclerae normal  HENT: ear canals and TM's normal, nose and mouth without ulcers or lesions  NECK: no adenopathy, no asymmetry, masses, or scars and thyroid normal to palpation  RESP: lungs clear to auscultation - no rales, rhonchi or wheezes  CV: regular rate and rhythm, normal S1 S2, no S3 or S4, no murmur, click or rub, no peripheral edema and peripheral pulses strong  ABDOMEN: soft, nontender, no hepatosplenomegaly, no masses and bowel sounds normal  MS: no gross musculoskeletal defects noted, no edema  SKIN: no suspicious lesions or rashes  NEURO: Normal strength and tone, mentation intact and speech normal  PSYCH: mentation appears normal, affect normal/bright    No results found for this or any previous visit (from the past 24 hour(s)).        Assessment & Plan     Medicare annual wellness visit, subsequent    Type 2 diabetes mellitus with hyperglycemia, without long-term " "current use of insulin (H)  - Hemoglobin A1c  - Basic metabolic panel  (Ca, Cl, CO2, Creat, Gluc, K, Na, BUN)  - *UA reflex to Microscopic and Culture ([place]; [place] [facility] [facility]; [facility]; [facility]; [facility]; [facility]; [place]; [place])  - FOOT EXAM  - Albumin Random Urine Quantitative with Creat Ratio  - OFFICE/OUTPT VISIT,EST,LEVL III    CKD (chronic kidney disease) stage 3, GFR 30-59 ml/min    Benign essential hypertension    Elevated triglycerides with high cholesterol  - Lipid panel reflex to direct LDL Fasting    Screen for colon cancer  - GASTROENTEROLOGY ADULT REF PROCEDURE ONLY; Future    Encounter for screening mammogram for breast cancer  - *MA Screening Digital Bilateral; Future     BMI:   Estimated body mass index is 29.87 kg/m  as calculated from the following:    Height as of this encounter: 1.626 m (5' 4\").    Weight as of this encounter: 78.9 kg (174 lb).   Weight management plan: Discussed healthy diet and exercise guidelines        Regular exercise    No follow-ups on file.    William Elizondo, DO  [facility]    "

## 2020-09-24 ENCOUNTER — TELEPHONE (OUTPATIENT)
Dept: FAMILY MEDICINE | Facility: OTHER | Age: 70
End: 2020-09-24

## 2020-09-24 LAB
BACTERIA SPEC CULT: ABNORMAL
SPECIMEN SOURCE: ABNORMAL

## 2020-09-24 RX ORDER — SULFAMETHOXAZOLE/TRIMETHOPRIM 800-160 MG
1 TABLET ORAL 2 TIMES DAILY
Qty: 14 TABLET | Refills: 0 | Status: SHIPPED | OUTPATIENT
Start: 2020-09-24 | End: 2020-12-02

## 2020-09-24 NOTE — TELEPHONE ENCOUNTER
Left message for call back. See msg. below.  Rajani Prieto CMA ([facility])

## 2020-09-24 NOTE — TELEPHONE ENCOUNTER
----- Message from William Elizondo DO sent at 9/24/2020  1:03 PM CDT -----  Please call        Dear Mera, your recent test results are attached.    Laboratory review shows the presence of an E. coli urinary tract infection.  I have taken the liberty of sending a prescription for an antibiotic (Bactrim) to your pharmacy.  Please start the medication and we should recheck your urine sample upon conclusion of the antibiotic.    Microalbumin was normal.    The hemoglobin A1c is 6.8 which is markedly improved over the previous 7.7 of last year.  The cholesterol is well controlled with an LDL of 75.  Chemistry panel demonstrates elevated blood sugar 161 and chronic decrease in kidney function.  This is essentially unchanged over previous.    You will be contacted with any outstanding results as they are available.  Feel free to contact me via the office or My Chart if you have any questions regarding the above.

## 2020-09-24 NOTE — RESULT ENCOUNTER NOTE
Please call        Dear Mera, your recent test results are attached.    Laboratory review shows the presence of an E. coli urinary tract infection.  I have taken the liberty of sending a prescription for an antibiotic (Bactrim) to your pharmacy.  Please start the medication and we should recheck your urine sample upon conclusion of the antibiotic.    Microalbumin was normal.    The hemoglobin A1c is 6.8 which is markedly improved over the previous 7.7 of last year.  The cholesterol is well controlled with an LDL of 75.  Chemistry panel demonstrates elevated blood sugar 161 and chronic decrease in kidney function.  This is essentially unchanged over previous.    You will be contacted with any outstanding results as they are available.  Feel free to contact me via the office or My Chart if you have any questions regarding the above.

## 2020-09-25 ENCOUNTER — TELEPHONE (OUTPATIENT)
Dept: FAMILY MEDICINE | Facility: OTHER | Age: 70
End: 2020-09-25

## 2020-09-25 NOTE — TELEPHONE ENCOUNTER
Left message for patient to return call to schedule EGD/colonoscopy. If Celina or Nani are not available, please transfer to same day surgery

## 2020-09-30 DIAGNOSIS — E11.65 TYPE 2 DIABETES MELLITUS WITH HYPERGLYCEMIA, WITHOUT LONG-TERM CURRENT USE OF INSULIN (H): ICD-10-CM

## 2020-09-30 RX ORDER — METFORMIN HCL 500 MG
TABLET, EXTENDED RELEASE 24 HR ORAL
Qty: 270 TABLET | Refills: 1 | Status: SHIPPED | OUTPATIENT
Start: 2020-09-30 | End: 2020-12-22

## 2020-09-30 NOTE — TELEPHONE ENCOUNTER
Metformin  Last Written Prescription Date:  3/17/20  Last Fill Quantity: 270 (3 per day)  # refills: 1   Last office visit: 9/22/2020 with prescribing provider:  Caro Hastings Office Visit:  NONE    Component      Latest Ref Rng & Units 2/12/2019 11/1/2019 9/22/2020   Creatinine      0.52 - 1.04 mg/dL 1.12 (H) 1.00 1.22 (H)   GFR Estimate      >60 mL/min/1.73:m2 50 (L) 57 (L) 44 (L)   GFR Estimate If Black      >60 mL/min/1.73:m2 58 (L) 66 52 (L)   Routing refill request to provider for review/approval because:  Labs out of range:  See Creatinine above  NANCI Mckeon

## 2020-11-12 DIAGNOSIS — E11.65 TYPE 2 DIABETES MELLITUS WITH HYPERGLYCEMIA, WITHOUT LONG-TERM CURRENT USE OF INSULIN (H): ICD-10-CM

## 2020-11-12 RX ORDER — LISINOPRIL 10 MG/1
TABLET ORAL
Qty: 90 TABLET | Refills: 1 | Status: SHIPPED | OUTPATIENT
Start: 2020-11-12 | End: 2020-12-01

## 2020-11-12 NOTE — TELEPHONE ENCOUNTER
Routing refill request to provider for review/approval because:  Labs out of range:  Creatinine    DENNIS SilverioN, RN  [facility]

## 2020-11-17 ENCOUNTER — APPOINTMENT (OUTPATIENT)
Dept: GENERAL RADIOLOGY | Facility: CLINIC | Age: 70
End: 2020-11-17
Attending: EMERGENCY MEDICINE
Payer: COMMERCIAL

## 2020-11-17 ENCOUNTER — HOSPITAL ENCOUNTER (EMERGENCY)
Facility: CLINIC | Age: 70
Discharge: HOME | End: 2020-11-17
Attending: EMERGENCY MEDICINE | Admitting: EMERGENCY MEDICINE
Payer: COMMERCIAL

## 2020-11-17 VITALS
HEART RATE: 84 BPM | OXYGEN SATURATION: 99 % | BODY MASS INDEX: 27.49 KG/M2 | HEIGHT: 65 IN | WEIGHT: 165 LBS | RESPIRATION RATE: 18 BRPM | DIASTOLIC BLOOD PRESSURE: 82 MMHG | TEMPERATURE: 98.6 F | SYSTOLIC BLOOD PRESSURE: 178 MMHG

## 2020-11-17 DIAGNOSIS — S72.112A CLOSED DISPLACED FRACTURE OF GREATER TROCHANTER OF LEFT FEMUR, INITIAL ENCOUNTER (H): ICD-10-CM

## 2020-11-17 PROCEDURE — 73552 X-RAY EXAM OF FEMUR 2/>: CPT | Mod: LT

## 2020-11-17 PROCEDURE — 27246 TREAT THIGH FRACTURE: CPT | Mod: LT | Performed by: EMERGENCY MEDICINE

## 2020-11-17 PROCEDURE — 99284 EMERGENCY DEPT VISIT MOD MDM: CPT | Mod: 25 | Performed by: EMERGENCY MEDICINE

## 2020-11-17 PROCEDURE — 96372 THER/PROPH/DIAG INJ SC/IM: CPT | Performed by: EMERGENCY MEDICINE

## 2020-11-17 PROCEDURE — 99285 EMERGENCY DEPT VISIT HI MDM: CPT | Mod: 25 | Performed by: EMERGENCY MEDICINE

## 2020-11-17 PROCEDURE — 250N000011 HC RX IP 250 OP 636: Performed by: EMERGENCY MEDICINE

## 2020-11-17 PROCEDURE — 72170 X-RAY EXAM OF PELVIS: CPT

## 2020-11-17 PROCEDURE — 27246 TREAT THIGH FRACTURE: CPT | Mod: 54 | Performed by: EMERGENCY MEDICINE

## 2020-11-17 RX ORDER — ACETAMINOPHEN 500 MG
1000 TABLET ORAL EVERY 4 HOURS PRN
COMMUNITY

## 2020-11-17 RX ORDER — IBUPROFEN 200 MG
400 TABLET ORAL EVERY 4 HOURS PRN
COMMUNITY

## 2020-11-17 RX ORDER — TRAMADOL HYDROCHLORIDE 50 MG/1
50 TABLET ORAL EVERY 6 HOURS PRN
Qty: 10 TABLET | Refills: 0 | Status: SHIPPED | OUTPATIENT
Start: 2020-11-17 | End: 2020-11-20

## 2020-11-17 RX ORDER — CAPSAICIN 0.025 %
CREAM (GRAM) TOPICAL 3 TIMES DAILY
COMMUNITY

## 2020-11-17 RX ORDER — ORPHENADRINE CITRATE 30 MG/ML
60 INJECTION INTRAMUSCULAR; INTRAVENOUS ONCE
Status: COMPLETED | OUTPATIENT
Start: 2020-11-17 | End: 2020-11-17

## 2020-11-17 RX ADMIN — ORPHENADRINE CITRATE 60 MG: 30 INJECTION INTRAMUSCULAR; INTRAVENOUS at 14:40

## 2020-11-17 ASSESSMENT — MIFFLIN-ST. JEOR: SCORE: 1264.32

## 2020-11-17 NOTE — ED AVS SNAPSHOT
[facility] Emergency Dept  [address] [place] [address]  Phone: [phone]  Fax: [phone]                                    Mera Valdivia   MRN: [id number]    Department: [facility] Emergency Dept   Date of Visit: 11/17/2020           After Visit Summary Signature Page    I have received my discharge instructions, and my questions have been answered. I have discussed any challenges I see with this plan with the nurse or doctor.    ..........................................................................................................................................  Patient/Patient Representative Signature      ..........................................................................................................................................  Patient Representative Print Name and Relationship to Patient    ..................................................               ................................................  Date                                   Time    ..........................................................................................................................................  Reviewed by Signature/Title    ...................................................              ..............................................  Date                                               Time          22EPIC Rev 08/18

## 2020-11-17 NOTE — ED TRIAGE NOTES
Fell in the barn 1 week ago today on left leg and pain in left thigh has continued and unable to bear full weight on leg.  Everything worse in the last 2 days

## 2020-11-17 NOTE — DISCHARGE INSTRUCTIONS
Your x-ray showed a fracture on the outer part of your left hip.  Right now it does not appear to need surgery    You should try to be nonweightbearing, or only toe-touch weightbearing.  If you need to move your hip or leg outward, you should use your hands to help  And not rely on your muscles or strain.  This may cause the fracture to be misaligned    A referral was made for the orthopedic surgery clinic here at [place].  Hopefully they will contact you regarding a follow-up appointment within the next 1 to 2 days.  If you do not hear from them, you should contact the office and inquire about a follow-up appointment    A prescription for tramadol was sent with you to get filled at the pharmacy of your choosing.  This medication is stronger than Tylenol or ibuprofen to help with any acute pain.  Be aware that this medication may make you drowsy, do not drive or drink alcohol if taking this medicine.  If you have more mild or moderate pain that does not require this strong medication, Tylenol or ibuprofen is fine    Return to the ER if you have any new or worsening symptoms

## 2020-11-17 NOTE — ED NOTES
"Had Pt try the walker to get around. Pt stated \" the walker should be just fin\". Advised the nurse of the pt's decision.  "

## 2020-11-17 NOTE — ED PROVIDER NOTES
History     Chief Complaint   Patient presents with     Leg Pain     HPI  Mera Valdivia is a 70 year old female who presents to the ER with left leg pain.  She said she was doing some work in her barn about 1 week ago when she fell and landed on her left leg and hip.  She skinned her knee, but said that it was not bleeding.  She felt a little bit sore but was able to ambulate and get back to her regular activities.  Progressively over the last week she has noticed more pain and soreness whenever she bears weight on her left leg.  She has now needed to use a cane to assist with ambulation.  She says that when she is lying in bed, or just sitting down, she does not notice any pain in her hip or leg.  She does not have any numbness or tingling down that left leg.  Only notices pain when she is walking.  She did try to take some Tylenol, which only helped a little bit.  She also took some ibuprofen this morning which she cannot tell if it helped.  She tried using some topical capsaicin cream, which she thinks may have helped, but still has an ache in her left hip and leg when she tries to stand on it.    Allergies:  Allergies   Allergen Reactions     Nitrous Oxide      Doesn't recall any reaction to this drug       Problem List:    Patient Active Problem List    Diagnosis Date Noted     Closed nondisplaced intertrochanteric fracture of right femur with routine healing, subsequent encounter 06/22/2018     Priority: Medium     Osteopenia of multiple sites 10/11/2017     Priority: Medium     Type 2 diabetes mellitus with hyperglycemia, without long-term current use of insulin (H) 05/03/2017     Priority: Medium     Acute anterior circulation transient ischemic attack 10/29/2016     Priority: Medium     CKD (chronic kidney disease) stage 3, GFR 30-59 ml/min 10/29/2016     Priority: Medium     Benign essential hypertension 10/24/2016     Priority: Medium     Endometrial cells on cervical Pap smear inconsistent w/LMP  11/11/2015     Priority: Medium     11/11/15 pap NIL/neg   1/15/16 consult with Dr. Waledn for endometrial cells on pap. Following.        Hyperlipidemia LDL goal <100 02/19/2015     Priority: Medium     Elevated triglycerides with high cholesterol 11/13/2012     Priority: Medium     Advanced directives, counseling/discussion 11/06/2012     Priority: Medium     Discussed advance care planning with patient; information given to patient to review.Luli Blaze RAULN   11/6/2012             Past Medical History:    Past Medical History:   Diagnosis Date     Basal cell carcinoma      Endometrial cells on cervical Pap smear inconsistent w/LMP 11/11/15       Past Surgical History:    Past Surgical History:   Procedure Laterality Date     DILATION AND CURETTAGE, OPERATIVE HYSTEROSCOPY, COMBINED N/A 2/16/2016    Procedure: COMBINED DILATION AND CURETTAGE, OPERATIVE HYSTEROSCOPY;  Surgeon: Jenae Walden MD;  Location: [facility] OR     HC FLEX SIGMOIDOSCOPY W/WO DELILAH SPEC BY BRUSH/WASH  1997     HC REVISE MEDIAN N/CARPAL TUNNEL SURG  1985?       Family History:    Family History   Problem Relation Age of Onset     Breast Cancer Sister      Cancer - colorectal Mother      Cardiovascular Father         QUADRUPLE BYPASS IN 1992       Social History:  Marital Status:   [2]  Social History     Tobacco Use     Smoking status: Never Smoker     Smokeless tobacco: Never Used   Substance Use Topics     Alcohol use: Yes     Alcohol/week: 0.0 standard drinks     Comment: once in a while     Drug use: No        Medications:         acetaminophen (TYLENOL) 500 MG tablet       amLODIPine (NORVASC) 5 MG tablet       aspirin 81 MG EC tablet       calcium-vitamin D (CALTRATE) 600-400 MG-UNIT per tablet       capsaicin (ZOSTRIX) 0.025 % external cream       ibuprofen (ADVIL/MOTRIN) 200 MG tablet       lisinopril (ZESTRIL) 10 MG tablet       metFORMIN (GLUCOPHAGE-XR) 500 MG 24 hr tablet       simvastatin (ZOCOR) 10 MG tablet       traMADol  "(ULTRAM) 50 MG tablet       alendronate (FOSAMAX) 70 MG tablet       sulfamethoxazole-trimethoprim (BACTRIM DS) 800-160 MG tablet          Review of Systems   All other systems reviewed and are negative.      Physical Exam   BP: (!) 185/88  Pulse: 84  Resp: 17  Height: 165.1 cm (5' 5\")  Weight: 74.8 kg (165 lb)  SpO2: 98 %      Physical Exam  Vitals signs and nursing note reviewed.   Constitutional:       General: She is not in acute distress.     Appearance: She is not diaphoretic.   HENT:      Head: Atraumatic.      Mouth/Throat:      Pharynx: No oropharyngeal exudate.   Eyes:      General: No scleral icterus.     Pupils: Pupils are equal, round, and reactive to light.   Cardiovascular:      Heart sounds: Normal heart sounds.   Pulmonary:      Effort: No respiratory distress.      Breath sounds: Normal breath sounds.   Abdominal:      General: Bowel sounds are normal.      Palpations: Abdomen is soft.      Tenderness: There is no abdominal tenderness.   Musculoskeletal:         General: No tenderness.      Left hip: She exhibits normal strength, no swelling and no deformity.      Comments: Equal leg lengths bilaterally, some pain with flexion of the left hip and with straight leg raise, however straight leg raise does not cause pain in her back.  No pain with internal or external rotation. She does not have any tenderness over the greater trochanter, ischial spine, or in her lower back   Skin:     General: Skin is warm.      Findings: No rash.         ED Course        Procedures               Critical Care time:  none               Results for orders placed or performed during the hospital encounter of 11/17/20 (from the past 24 hour(s))   XR Pelvis 1/2 Views    Narrative    PELVIS ONE TO TWO VIEWS;  LEFT FEMUR TWO VIEWS  11/17/2020 2:53 PM     HISTORY:  Fall, hip pain.    COMPARISON:  5/31/2018    FINDINGS: Lumbar spine degenerative change. Mild symphysis pubis  degenerative change. Old ununited fracture of the " right greater  trochanter with distraction. Knee medial compartment osteoarthritis.      Impression    IMPRESSION: There is a mildly displaced fracture at the superior  aspect of the left greater trochanter.    THERESE WISEMAN MD   XR Femur Left 2 Views    Narrative    PELVIS ONE TO TWO VIEWS;  LEFT FEMUR TWO VIEWS  11/17/2020 2:53 PM     HISTORY:  Fall, hip pain.    COMPARISON:  5/31/2018    FINDINGS: Lumbar spine degenerative change. Mild symphysis pubis  degenerative change. Old ununited fracture of the right greater  trochanter with distraction. Knee medial compartment osteoarthritis.      Impression    IMPRESSION: There is a mildly displaced fracture at the superior  aspect of the left greater trochanter.    THERESE WISEMAN MD       Medications   orphenadrine (NORFLEX) injection 60 mg (60 mg Intramuscular Given 11/17/20 1440)       Assessments & Plan (with Medical Decision Making)  Mera is a 70-year-old female who presents to the ER 1 week after a fall onto her left hip.  She has continued left leg pain that has progressively gotten worse with attempts to bear weight.  See history and focused physical exam as above  Well-appearing 70-year-old female in no acute distress, vital signs are stable and she is afebrile.  Normal peripheral pulses of bilateral lower extremities.  She does not have any obvious deformity, equal leg lengths.  She does have some pain with flexion of her left hip as well as with straight leg raise of her left hip, but straight leg raise did not cause any back pain.  She does not have any tenderness to palpation, no overlying ecchymosis or skin lesions, no obvious deformity, no crepitus.  Will obtain x-rays to evaluate for any underlying fractures, but since I suspect that this is more of a muscular injury, will give a dose of Norflex.  She is agreeable to this plan  X-ray results as above.  After calling and confirming appropriate findings and impression with radiologist, he confirms there is a  mildly displaced fracture at the superior aspect of the left greater trochanter.  Also note an old right greater trochanteric fracture.  Will page Ortho to be sure that this is nonoperable and discussed plan for follow-up.  Initially paged at 1600  1710 Spoke with Dr. Upton, ortho on call. Okay to do toe touch weight bearing, pain control, follow up with sports med/ortho. No CT if no pain with rotation of left hip  Discussed the recommendations with Mera.  Tried with both crutches and walker in the department, she is able to better tolerate the toe-touch weightbearing when using a walker.  Prescription was printed for tramadol, though she says that she normally does not take strong pain medication and has been doing okay with Tylenol and ibuprofen.  Discussed that she may continue this, but if the pain does become more severe that she may use tramadol if desired.  Discussed dosing and side effects of the tramadol and that she should not drive or drink alcohol while taking this medicine.  A referral for nonoperative Ortho/sports medicine was placed.  Discussed reasons to return to the ER.  She is discharged in no acute distress     I have reviewed the nursing notes.    I have reviewed the findings, diagnosis, plan and need for follow up with the patient.       New Prescriptions    TRAMADOL (ULTRAM) 50 MG TABLET    Take 1 tablet (50 mg) by mouth every 6 hours as needed for severe pain       Final diagnoses:   Closed displaced fracture of greater trochanter of left femur, initial encounter (H)       11/17/2020   [facility] EMERGENCY DEPT     Li Penny DO  11/17/20 1924

## 2020-11-30 DIAGNOSIS — E78.5 HYPERLIPIDEMIA LDL GOAL <100: ICD-10-CM

## 2020-11-30 DIAGNOSIS — E11.65 TYPE 2 DIABETES MELLITUS WITH HYPERGLYCEMIA, WITHOUT LONG-TERM CURRENT USE OF INSULIN (H): ICD-10-CM

## 2020-12-01 RX ORDER — AMLODIPINE BESYLATE 5 MG/1
5 TABLET ORAL DAILY
Qty: 90 TABLET | Refills: 0 | Status: SHIPPED | OUTPATIENT
Start: 2020-12-01 | End: 2021-03-05

## 2020-12-01 RX ORDER — LISINOPRIL 10 MG/1
10 TABLET ORAL DAILY
Qty: 90 TABLET | Refills: 0 | Status: SHIPPED | OUTPATIENT
Start: 2020-12-01 | End: 2021-05-11

## 2020-12-01 RX ORDER — SIMVASTATIN 10 MG
10 TABLET ORAL AT BEDTIME
Qty: 90 TABLET | Refills: 0 | Status: SHIPPED | OUTPATIENT
Start: 2020-12-01 | End: 2021-03-18

## 2020-12-01 NOTE — TELEPHONE ENCOUNTER
Routing refill request to provider for review/approval because:  Labs out of range:  BP, Creatinine    MAX Silverio, RN  [facility]

## 2020-12-02 ENCOUNTER — ANCILLARY PROCEDURE (OUTPATIENT)
Dept: GENERAL RADIOLOGY | Facility: CLINIC | Age: 70
End: 2020-12-02
Attending: ORTHOPAEDIC SURGERY
Payer: COMMERCIAL

## 2020-12-02 ENCOUNTER — OFFICE VISIT (OUTPATIENT)
Dept: ORTHOPEDICS | Facility: CLINIC | Age: 70
End: 2020-12-02
Payer: COMMERCIAL

## 2020-12-02 VITALS
WEIGHT: 165 LBS | HEIGHT: 65 IN | SYSTOLIC BLOOD PRESSURE: 112 MMHG | BODY MASS INDEX: 27.49 KG/M2 | DIASTOLIC BLOOD PRESSURE: 60 MMHG

## 2020-12-02 DIAGNOSIS — S72.115A CLOSED NONDISPLACED FRACTURE OF GREATER TROCHANTER OF LEFT FEMUR, INITIAL ENCOUNTER (H): Primary | ICD-10-CM

## 2020-12-02 DIAGNOSIS — M25.552 LEFT HIP PAIN: ICD-10-CM

## 2020-12-02 PROCEDURE — 73502 X-RAY EXAM HIP UNI 2-3 VIEWS: CPT | Performed by: RADIOLOGY

## 2020-12-02 PROCEDURE — 99213 OFFICE O/P EST LOW 20 MIN: CPT | Mod: 57 | Performed by: ORTHOPAEDIC SURGERY

## 2020-12-02 PROCEDURE — 27246 TREAT THIGH FRACTURE: CPT | Mod: 55 | Performed by: ORTHOPAEDIC SURGERY

## 2020-12-02 ASSESSMENT — PAIN SCALES - GENERAL: PAINLEVEL: NO PAIN (0)

## 2020-12-02 ASSESSMENT — MIFFLIN-ST. JEOR: SCORE: 1264.32

## 2020-12-02 NOTE — LETTER
12/2/2020         RE: Mera Valdivia  [address] [address]        Dear Colleague,    Thank you for referring your patient, Mera Valdivia, to the [facility]. Please see a copy of my visit note below.    ORTHOPEDIC CONSULT      Chief Complaint: Mera Valdivia is a 70 year old female who is being seen for   Chief Complaint   Patient presents with     Musculoskeletal Problem     left femur injury-DOI 11/10/2020- fell in the barn       History of Present Illness:    about 3 weeks ago, fell while in her barn, landing on her left side. Initially, no pain.  Was ambulatory immediately after.  About 3-4 days later started to hurt. Was then seen in the ER November 17, 2020. Dx with mildly displaced greater trochanteric fx, given a walker recommended toe touch only and follow-up with orthopedics.   has been following these recommendations. Doing very well. No pain at rest, sleeping (including on her left side), activity.   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  Taking only tylenol.    did have similar injury on the right previously and healed without any surgery.     Patient's past medical, surgical, social and family histories reviewed.     Past Medical History:   Diagnosis Date     Basal cell carcinoma      Endometrial cells on cervical Pap smear inconsistent w/LMP 11/11/15    pap NIL/neg        Past Surgical History:   Procedure Laterality Date     DILATION AND CURETTAGE, OPERATIVE HYSTEROSCOPY, COMBINED N/A 2/16/2016    Procedure: COMBINED DILATION AND CURETTAGE, OPERATIVE HYSTEROSCOPY;  Surgeon: Jenae Walden MD;  Location:  OR     HC FLEX SIGMOIDOSCOPY W/WO DELILAH SPEC BY BRUSH/WASH  1997     HC REVISE MEDIAN N/CARPAL TUNNEL SURG  1985?       Medications:       acetaminophen (TYLENOL) 500 MG tablet, Take 1,000 mg by mouth every 4 hours as needed for mild pain       alendronate (FOSAMAX) 70 MG tablet, Take 1 tablet (70 mg) by  "mouth every 7 days Take with over 8 ounces water and stay upright for at least 30 minutes after dose.  Take at least 60 minutes before breakfast       amLODIPine (NORVASC) 5 MG tablet, Take 1 tablet (5 mg) by mouth daily       aspirin 81 MG EC tablet, Take 1 tablet (81 mg) by mouth daily       calcium-vitamin D (CALTRATE) 600-400 MG-UNIT per tablet, Take 1 tablet by mouth 2 times daily       capsaicin (ZOSTRIX) 0.025 % external cream, Apply topically 3 times daily       ibuprofen (ADVIL/MOTRIN) 200 MG tablet, Take 400 mg by mouth every 4 hours as needed for mild pain       lisinopril (ZESTRIL) 10 MG tablet, Take 1 tablet (10 mg) by mouth daily       metFORMIN (GLUCOPHAGE-XR) 500 MG 24 hr tablet, TAKE THREE TABLETS BY MOUTH ONCE DAILY WITH DINNER       simvastatin (ZOCOR) 10 MG tablet, Take 1 tablet (10 mg) by mouth At Bedtime Appointment needed for additional refills.    No current facility-administered medications on file prior to visit.       Allergies   Allergen Reactions     Nitrous Oxide      Doesn't recall any reaction to this drug       Social History     Occupational History     Not on file   Tobacco Use     Smoking status: Never Smoker     Smokeless tobacco: Never Used   Substance and Sexual Activity     Alcohol use: Yes     Alcohol/week: 0.0 standard drinks     Comment: once in a while     Drug use: No     Sexual activity: Not Currently     Partners: Male       Family History   Problem Relation Age of Onset     Breast Cancer Sister      Cancer - colorectal Mother      Cardiovascular Father         QUADRUPLE BYPASS IN 1992       REVIEW OF SYSTEMS  10 point review systems performed otherwise negative as noted as per history of present illness.    Physical Exam:  Vitals: /60   Ht 1.651 m (5' 5\")   Wt 74.8 kg (165 lb)   BMI 27.46 kg/m    BMI= Body mass index is 27.46 kg/m .  Constitutional: healthy, alert and no acute distress   Psychiatric: mentation appears normal and affect normal/bright  NEURO: no " focal deficits  RESP: Normal with easy respirations and no use of accessory muscles to breathe, no audible wheezing or retractions  CV: LLE:  no edema  SKIN: abrasia to distal knee, with large prominent scab. No evidence of infection.   JOINT/EXTREMITIES:left hip: no focal tenderness including over the left greater trochanter. Passive motion without pain including external and internal rotation. Quick sit to stand without pain. No rotation of the leg. Grossly same length. Calf soft non-tender.   Lymph: no appreciated lymphedema  GAIT: not tested     Diagnostic Modalities:  left hip X-ray: November 17, 2020 very minimal displacement superior aspect left greater trochanteric fracture  December 2, 2020: Unchanged very subtle cortical irregularity along superior aspect the greater trochanter  Independent visualization of the images was performed.      Impression: left minimal to nondisplaced greater trochanteric fracture    Plan:  All of the above pertinent physical exam and imaging modalities findings was reviewed with Mera.  Clinically has no pain and has been resting. Has been toe touch only with a walker when not resting. Imaging today is unchanged. Doing very well. Was able to bear full weight for the first 3-4 days after the injury without issue. Recommended to continue toe touch only with walker for the next 3 weeks then follow-up. Anticipate advancing activity at that time.        Return to clinic 3-4 weeks with Stephen Kay or Beckie Fox, or sooner as needed for changes.  Re-x-ray on return: Yes.    Scribed by:  LISHA Oliveira, CNP  2:53 PM  12/2/2020  The information in this document, created by a scribe for me, accurately reflects the services I personally performed and the decisions made by me. I have reviewed and approved this document for accuracy    Elie Anderosn, DO           Again, thank you for allowing me to participate in the care of your patient.        Sincerely,        Elie Anderson,  DO

## 2020-12-02 NOTE — PROGRESS NOTES
Appropriate assistive devices provided during their visit. yes (Yes, No, N/A) w/c (list device)    Exam table and/or cart  placed in the lowest position. yes (Yes, No, N/A)    Brakes on tables/carts/wheelchairs used at all times. yes (Yes, No, N/A)    Non slip footwear applied. na (Yes, No, NA)    Patient was accompanied by staff throughout visit. yes (Yes, No, N/A)    Equipment safety straps used. na (Yes, No, N/A)    Assist with toileting. na (Yes, No, N/A)

## 2020-12-02 NOTE — PROGRESS NOTES
ORTHOPEDIC CONSULT      Chief Complaint: Mera Valdivia is a 70 year old female who is being seen for   Chief Complaint   Patient presents with     Musculoskeletal Problem     left femur injury-DOI 11/10/2020- fell in the barn       History of Present Illness:    about 3 weeks ago, fell while in her barn, landing on her left side. Initially, no pain.  Was ambulatory immediately after.  About 3-4 days later started to hurt. Was then seen in the ER November 17, 2020. Dx with mildly displaced greater trochanteric fx, given a walker recommended toe touch only and follow-up with orthopedics.   has been following these recommendations. Doing very well. No pain at rest, sleeping (including on her left side), activity.   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  Taking only tylenol.    did have similar injury on the right previously and healed without any surgery.     Patient's past medical, surgical, social and family histories reviewed.     Past Medical History:   Diagnosis Date     Basal cell carcinoma      Endometrial cells on cervical Pap smear inconsistent w/LMP 11/11/15    pap NIL/neg        Past Surgical History:   Procedure Laterality Date     DILATION AND CURETTAGE, OPERATIVE HYSTEROSCOPY, COMBINED N/A 2/16/2016    Procedure: COMBINED DILATION AND CURETTAGE, OPERATIVE HYSTEROSCOPY;  Surgeon: Jenae Walden MD;  Location:  OR     HC FLEX SIGMOIDOSCOPY W/WO DELILAH SPEC BY BRUSH/WASH  1997     HC REVISE MEDIAN N/CARPAL TUNNEL SURG  1985?       Medications:       acetaminophen (TYLENOL) 500 MG tablet, Take 1,000 mg by mouth every 4 hours as needed for mild pain       alendronate (FOSAMAX) 70 MG tablet, Take 1 tablet (70 mg) by mouth every 7 days Take with over 8 ounces water and stay upright for at least 30 minutes after dose.  Take at least 60 minutes before breakfast       amLODIPine (NORVASC) 5 MG tablet, Take 1 tablet (5 mg) by mouth daily       aspirin 81 MG EC tablet, Take 1  "tablet (81 mg) by mouth daily       calcium-vitamin D (CALTRATE) 600-400 MG-UNIT per tablet, Take 1 tablet by mouth 2 times daily       capsaicin (ZOSTRIX) 0.025 % external cream, Apply topically 3 times daily       ibuprofen (ADVIL/MOTRIN) 200 MG tablet, Take 400 mg by mouth every 4 hours as needed for mild pain       lisinopril (ZESTRIL) 10 MG tablet, Take 1 tablet (10 mg) by mouth daily       metFORMIN (GLUCOPHAGE-XR) 500 MG 24 hr tablet, TAKE THREE TABLETS BY MOUTH ONCE DAILY WITH DINNER       simvastatin (ZOCOR) 10 MG tablet, Take 1 tablet (10 mg) by mouth At Bedtime Appointment needed for additional refills.    No current facility-administered medications on file prior to visit.       Allergies   Allergen Reactions     Nitrous Oxide      Doesn't recall any reaction to this drug       Social History     Occupational History     Not on file   Tobacco Use     Smoking status: Never Smoker     Smokeless tobacco: Never Used   Substance and Sexual Activity     Alcohol use: Yes     Alcohol/week: 0.0 standard drinks     Comment: once in a while     Drug use: No     Sexual activity: Not Currently     Partners: Male       Family History   Problem Relation Age of Onset     Breast Cancer Sister      Cancer - colorectal Mother      Cardiovascular Father         QUADRUPLE BYPASS IN 1992       REVIEW OF SYSTEMS  10 point review systems performed otherwise negative as noted as per history of present illness.    Physical Exam:  Vitals: /60   Ht 1.651 m (5' 5\")   Wt 74.8 kg (165 lb)   BMI 27.46 kg/m    BMI= Body mass index is 27.46 kg/m .  Constitutional: healthy, alert and no acute distress   Psychiatric: mentation appears normal and affect normal/bright  NEURO: no focal deficits  RESP: Normal with easy respirations and no use of accessory muscles to breathe, no audible wheezing or retractions  CV: LLE:  no edema  SKIN: abrasia to distal knee, with large prominent scab. No evidence of infection. "   JOINT/EXTREMITIES:left hip: no focal tenderness including over the left greater trochanter. Passive motion without pain including external and internal rotation. Quick sit to stand without pain. No rotation of the leg. Grossly same length. Calf soft non-tender.   Lymph: no appreciated lymphedema  GAIT: not tested     Diagnostic Modalities:  left hip X-ray: November 17, 2020 very minimal displacement superior aspect left greater trochanteric fracture  December 2, 2020: Unchanged very subtle cortical irregularity along superior aspect the greater trochanter  Independent visualization of the images was performed.      Impression: left minimal to nondisplaced greater trochanteric fracture    Plan:  All of the above pertinent physical exam and imaging modalities findings was reviewed with Mera.  Clinically has no pain and has been resting. Has been toe touch only with a walker when not resting. Imaging today is unchanged. Doing very well. Was able to bear full weight for the first 3-4 days after the injury without issue. Recommended to continue toe touch only with walker for the next 3 weeks then follow-up. Anticipate advancing activity at that time.        Return to clinic 3-4 weeks with Stephen Kay or Beckie Fox, or sooner as needed for changes.  Re-x-ray on return: Yes.    Scribed by:  LISHA Oliveira, CNP  2:53 PM  12/2/2020  The information in this document, created by a scribe for me, accurately reflects the services I personally performed and the decisions made by me. I have reviewed and approved this document for accuracy    Elie Anderson DO

## 2020-12-22 DIAGNOSIS — E11.65 TYPE 2 DIABETES MELLITUS WITH HYPERGLYCEMIA, WITHOUT LONG-TERM CURRENT USE OF INSULIN (H): ICD-10-CM

## 2020-12-23 RX ORDER — METFORMIN HCL 500 MG
TABLET, EXTENDED RELEASE 24 HR ORAL
Qty: 270 TABLET | Refills: 1 | Status: SHIPPED | OUTPATIENT
Start: 2020-12-23 | End: 2021-06-30

## 2020-12-23 NOTE — TELEPHONE ENCOUNTER
Prescription approved per [facility] Refill Protocol.    Keesha Melchor RN

## 2020-12-29 ENCOUNTER — OFFICE VISIT (OUTPATIENT)
Dept: ORTHOPEDICS | Facility: CLINIC | Age: 70
End: 2020-12-29
Payer: COMMERCIAL

## 2020-12-29 ENCOUNTER — ANCILLARY PROCEDURE (OUTPATIENT)
Dept: GENERAL RADIOLOGY | Facility: CLINIC | Age: 70
End: 2020-12-29
Attending: PHYSICIAN ASSISTANT
Payer: COMMERCIAL

## 2020-12-29 VITALS
WEIGHT: 173 LBS | HEIGHT: 65 IN | DIASTOLIC BLOOD PRESSURE: 74 MMHG | SYSTOLIC BLOOD PRESSURE: 138 MMHG | BODY MASS INDEX: 28.82 KG/M2

## 2020-12-29 DIAGNOSIS — S72.115A CLOSED NONDISPLACED FRACTURE OF GREATER TROCHANTER OF LEFT FEMUR, INITIAL ENCOUNTER (H): ICD-10-CM

## 2020-12-29 DIAGNOSIS — S72.115D CLOSED NONDISPLACED FRACTURE OF GREATER TROCHANTER OF LEFT FEMUR WITH ROUTINE HEALING, SUBSEQUENT ENCOUNTER: Primary | ICD-10-CM

## 2020-12-29 PROCEDURE — 99207 PR FRACTURE CARE IN GLOBAL PERIOD: CPT | Performed by: PHYSICIAN ASSISTANT

## 2020-12-29 PROCEDURE — 73502 X-RAY EXAM HIP UNI 2-3 VIEWS: CPT | Mod: LT | Performed by: RADIOLOGY

## 2020-12-29 ASSESSMENT — MIFFLIN-ST. JEOR: SCORE: 1300.6

## 2020-12-29 ASSESSMENT — PAIN SCALES - GENERAL: PAINLEVEL: NO PAIN (0)

## 2020-12-29 NOTE — PROGRESS NOTES
Office Visit-Fracture Follow up    Chief Complaint: Mera Valdivia is a 70 year old female who is being seen for   Chief Complaint   Patient presents with     Fracture Followup     left femur injury-DOI 11/10/2020- fell in the barn       History of Present Illness:   Mechanism of Injury: Fall in her barn 11/10/2020.  Location: Left femoral greater trochanteric fracture  Duration of Pain: Since date of injury 11/10/2020 however the patient has not had much pain throughout the course  Rating of Pain: 0 out of 10  Pain Quality: No pain  Pain is better with: No pain  Pain is worse with: No pain  Treatment so far consists of: Toe-touch weightbearing and walker however the patient admits to putting full weight on it every once while.  She does not have pain when she does this.   Associated Features: Denies any numbness or tingling or in pain on the left hip.  Pain is Limiting: Nothing  Here to: Orthopedic follow-up and x-ray  Additional History: Patient is a diabetic under good control she states and she does not smoke.  Patient has been cautious with her leg however she has not been doing the toe-touch weightbearing all the time.  She does try and use her walker when she is at home however she did come into clinic today without her walker.    REVIEW OF SYSTEMS  General: negative for, night sweats, dizziness, fatigue  Resp: No shortness of breath and no cough  CV: negative for chest pain, syncope or near-syncope  GI: negative for nausea, vomiting and diarrhea  : negative for dysuria and hematuria  Musculoskeletal: as above  Neurologic: negative for syncope   Hematologic: negative for bleeding disorder    Physical Exam:  Vitals: There were no vitals taken for this visit.  BMI= There is no height or weight on file to calculate BMI.  Constitutional: healthy, alert and no acute distress   Psychiatric: mentation appears normal and affect normal/bright  NEURO: no focal deficits, CMS intact left lower extremity   RESP:  Normal with easy respirations and no use of accessory muscles to breathe, no audible wheezing or retractions  CV: Calf soft and nontender to palpation, leg warm   SKIN: No erythema, rashes, excoriation, or breakdown. No evidence of infection of the skin that I can see.  I did not have her take her pants off today.  MUSCULOSKELETAL:    INSPECTION of left hip: No gross deformities, erythema, edema, ecchymosis, atrophy or fasciculations.     PALPATION: no tenderness over the lateral hip and greater trochanteric region, thigh or lower leg.     ROM: Passive: Flexion to 130 degrees, internal rotation and external rotation I did not check today.      STRENGTH: 5 out of 5 hip flexion, quad and hamstring, 5 out of 5 abductor strength without pain    SPECIAL TEST: none  GAIT: non-antalgic  Lymph: no palpable lymph nodes      Diagnostic Modalities:  Recent Results (from the past 744 hour(s))   XR Hip Left 2-3 Views    Narrative    HIP LEFT TWO TO THREE VIEWS December 2, 2020 2:26 PM     HISTORY: Left hip pain.    COMPARISON: Pelvis x-rays dated 11/17/2020.    FINDINGS: Chronic avulsion fracture or enthesopathic change of the  left greater trochanter is again seen. No acute fracture or  malalignment. Hip joint spaces are grossly maintained. Degenerative  changes of the lumbar spine are partially imaged. Chronic appearing  degenerative changes of the left SI joint are stable. Phleboliths are  again seen in the pelvis. Radiopacity is again projected over the left  side of the sacrum and was seen on the prior pelvis x-rays dated  5/31/2018 and could represent a phlebolith or bone island. No  aggressive osseous or acute osseous fracture.      Impression    IMPRESSION:  1. Degenerative changes a left SI joint are again noted. Degenerative  changes of the lumbar spine are partially imaged.  2. Subtle chronic avulsion fracture of the greater tuberosity, versus  enthesopathic changes of the tendinous insertions into the  greater  trochanter, is stable since the prior study.  3. No definite etiology for patient's symptoms is otherwise seen.    TAMARA KYLE MD     I agree with the above reading.    Today we got AP and lateral of the left hip.  These x-rays show that he greater trochanteric area has mineralization over the fracture.  The fracture has not changed in positioning.  There is no other fracture dislocation or tumor.    Independent visualization of the images was performed.      Impression: 1.  1 month 19 days status post left minimal to nondisplaced greater trochanteric fracture.    Plan:  All of the above pertinent physical exam and imaging modalities findings was reviewed with Mera.    FOCUSED PLAN:   Patient with no complaints.  She admits to ambulating at home with full weightbearing without the walker and not having any pain.  She states she tries to use the walker at home when she can.  Patient denies any lateral hip pain.  Patient can now discontinue walker and also increase her weightbearing status to weightbearing as tolerated.  Follow-up on an as-needed basis.    Re-x-ray on return: No    BP Readings from Last 1 Encounters:   12/02/20 112/60       BP noted to be well controlled today in office.      Patient does not use Tobacco products.    This note was dictated with [employer].    Antony Kay PA-C

## 2020-12-29 NOTE — LETTER
12/29/2020         RE: Mera Valdivia  [address] [id number]        Dear Colleague,    Thank you for referring your patient, Mera Valdivia, to the [facility]. Please see a copy of my visit note below.    Office Visit-Fracture Follow up    Chief Complaint: Mera Valdivia is a 70 year old female who is being seen for   Chief Complaint   Patient presents with     Fracture Followup     left femur injury-DOI 11/10/2020- fell in the barn       History of Present Illness:   Mechanism of Injury: Fall in her barn 11/10/2020.  Location: Left femoral greater trochanteric fracture  Duration of Pain: Since date of injury 11/10/2020 however the patient has not had much pain throughout the course  Rating of Pain: 0 out of 10  Pain Quality: No pain  Pain is better with: No pain  Pain is worse with: No pain  Treatment so far consists of: Toe-touch weightbearing and walker however the patient admits to putting full weight on it every once while.  She does not have pain when she does this.   Associated Features: Denies any numbness or tingling or in pain on the left hip.  Pain is Limiting: Nothing  Here to: Orthopedic follow-up and x-ray  Additional History: Patient is a diabetic under good control she states and she does not smoke.  Patient has been cautious with her leg however she has not been doing the toe-touch weightbearing all the time.  She does try and use her walker when she is at home however she did come into clinic today without her walker.    REVIEW OF SYSTEMS  General: negative for, night sweats, dizziness, fatigue  Resp: No shortness of breath and no cough  CV: negative for chest pain, syncope or near-syncope  GI: negative for nausea, vomiting and diarrhea  : negative for dysuria and hematuria  Musculoskeletal: as above  Neurologic: negative for syncope   Hematologic: negative for bleeding disorder    Physical Exam:  Vitals: There were no vitals taken for this  visit.  BMI= There is no height or weight on file to calculate BMI.  Constitutional: healthy, alert and no acute distress   Psychiatric: mentation appears normal and affect normal/bright  NEURO: no focal deficits, CMS intact left lower extremity   RESP: Normal with easy respirations and no use of accessory muscles to breathe, no audible wheezing or retractions  CV: Calf soft and nontender to palpation, leg warm   SKIN: No erythema, rashes, excoriation, or breakdown. No evidence of infection of the skin that I can see.  I did not have her take her pants off today.  MUSCULOSKELETAL:    INSPECTION of left hip: No gross deformities, erythema, edema, ecchymosis, atrophy or fasciculations.     PALPATION: no tenderness over the lateral hip and greater trochanteric region, thigh or lower leg.     ROM: Passive: Flexion to 130 degrees, internal rotation and external rotation I did not check today.      STRENGTH: 5 out of 5 hip flexion, quad and hamstring, 5 out of 5 abductor strength without pain    SPECIAL TEST: none  GAIT: non-antalgic  Lymph: no palpable lymph nodes      Diagnostic Modalities:  Recent Results (from the past 744 hour(s))   XR Hip Left 2-3 Views    Narrative    HIP LEFT TWO TO THREE VIEWS December 2, 2020 2:26 PM     HISTORY: Left hip pain.    COMPARISON: Pelvis x-rays dated 11/17/2020.    FINDINGS: Chronic avulsion fracture or enthesopathic change of the  left greater trochanter is again seen. No acute fracture or  malalignment. Hip joint spaces are grossly maintained. Degenerative  changes of the lumbar spine are partially imaged. Chronic appearing  degenerative changes of the left SI joint are stable. Phleboliths are  again seen in the pelvis. Radiopacity is again projected over the left  side of the sacrum and was seen on the prior pelvis x-rays dated  5/31/2018 and could represent a phlebolith or bone island. No  aggressive osseous or acute osseous fracture.      Impression    IMPRESSION:  1.  Degenerative changes a left SI joint are again noted. Degenerative  changes of the lumbar spine are partially imaged.  2. Subtle chronic avulsion fracture of the greater tuberosity, versus  enthesopathic changes of the tendinous insertions into the greater  trochanter, is stable since the prior study.  3. No definite etiology for patient's symptoms is otherwise seen.    TAMARA KYLE MD     I agree with the above reading.    Today we got AP and lateral of the left hip.  These x-rays show that he greater trochanteric area has mineralization over the fracture.  The fracture has not changed in positioning.  There is no other fracture dislocation or tumor.    Independent visualization of the images was performed.      Impression: 1.  1 month 19 days status post left minimal to nondisplaced greater trochanteric fracture.    Plan:  All of the above pertinent physical exam and imaging modalities findings was reviewed with Mera.    FOCUSED PLAN:   Patient with no complaints.  She admits to ambulating at home with full weightbearing without the walker and not having any pain.  She states she tries to use the walker at home when she can.  Patient denies any lateral hip pain.  Patient can now discontinue walker and also increase her weightbearing status to weightbearing as tolerated.  Follow-up on an as-needed basis.    Re-x-ray on return: No    BP Readings from Last 1 Encounters:   12/02/20 112/60       BP noted to be well controlled today in office.      Patient does not use Tobacco products.    This note was dictated with [employer].    Antony Kay PA-C        Again, thank you for allowing me to participate in the care of your patient.        Sincerely,        Antony Kay PA-C

## 2021-03-05 DIAGNOSIS — E78.5 HYPERLIPIDEMIA LDL GOAL <100: ICD-10-CM

## 2021-03-05 NOTE — TELEPHONE ENCOUNTER
Routing refill request to provider for review/approval because:  Labs out of range:  Creatinine    DENNIS SilverioN, RN  [facility]

## 2021-03-08 RX ORDER — AMLODIPINE BESYLATE 5 MG/1
TABLET ORAL
Qty: 90 TABLET | Refills: 1 | Status: SHIPPED | OUTPATIENT
Start: 2021-03-08 | End: 2021-09-03

## 2021-03-17 DIAGNOSIS — E11.65 TYPE 2 DIABETES MELLITUS WITH HYPERGLYCEMIA, WITHOUT LONG-TERM CURRENT USE OF INSULIN (H): ICD-10-CM

## 2021-03-17 DIAGNOSIS — E78.5 HYPERLIPIDEMIA LDL GOAL <100: ICD-10-CM

## 2021-03-18 RX ORDER — SIMVASTATIN 10 MG
TABLET ORAL
Qty: 90 TABLET | Refills: 1 | Status: SHIPPED | OUTPATIENT
Start: 2021-03-18 | End: 2021-09-17

## 2021-03-18 NOTE — TELEPHONE ENCOUNTER
Routing refill request to provider for review/approval because:  Drug interaction warning    Keesha Melchor RN

## 2021-03-31 ENCOUNTER — IMMUNIZATION (OUTPATIENT)
Dept: FAMILY MEDICINE | Facility: CLINIC | Age: 71
End: 2021-03-31
Payer: COMMERCIAL

## 2021-03-31 PROCEDURE — 0011A PR COVID VAC MODERNA 100 MCG/0.5 ML IM: CPT

## 2021-03-31 PROCEDURE — 91301 PR COVID VAC MODERNA 100 MCG/0.5 ML IM: CPT

## 2021-04-28 ENCOUNTER — IMMUNIZATION (OUTPATIENT)
Dept: FAMILY MEDICINE | Facility: CLINIC | Age: 71
End: 2021-04-28
Attending: FAMILY MEDICINE
Payer: COMMERCIAL

## 2021-04-28 PROCEDURE — 91301 PR COVID VAC MODERNA 100 MCG/0.5 ML IM: CPT

## 2021-04-28 PROCEDURE — 0012A PR COVID VAC MODERNA 100 MCG/0.5 ML IM: CPT

## 2021-05-09 DIAGNOSIS — E11.65 TYPE 2 DIABETES MELLITUS WITH HYPERGLYCEMIA, WITHOUT LONG-TERM CURRENT USE OF INSULIN (H): ICD-10-CM

## 2021-05-11 RX ORDER — LISINOPRIL 10 MG/1
TABLET ORAL
Qty: 90 TABLET | Refills: 0 | Status: SHIPPED | OUTPATIENT
Start: 2021-05-11 | End: 2021-08-11

## 2021-05-11 NOTE — TELEPHONE ENCOUNTER
Routing refill request to provider for review/approval because:  Labs out of range:  Creatinine    DENNIS SilverioN, RN  [facility]

## 2021-06-09 ENCOUNTER — TRANSFERRED RECORDS (OUTPATIENT)
Dept: HEALTH INFORMATION MANAGEMENT | Facility: CLINIC | Age: 71
End: 2021-06-09

## 2021-06-09 LAB — RETINOPATHY: POSITIVE

## 2021-06-30 DIAGNOSIS — E11.65 TYPE 2 DIABETES MELLITUS WITH HYPERGLYCEMIA, WITHOUT LONG-TERM CURRENT USE OF INSULIN (H): ICD-10-CM

## 2021-06-30 RX ORDER — METFORMIN HCL 500 MG
TABLET, EXTENDED RELEASE 24 HR ORAL
Qty: 270 TABLET | Refills: 0 | Status: SHIPPED | OUTPATIENT
Start: 2021-06-30 | End: 2021-09-22

## 2021-06-30 NOTE — TELEPHONE ENCOUNTER
Called and LM for patient to call back. Please relay note below and schedule appointment needed.   Jacquelyn Nicole MA

## 2021-06-30 NOTE — TELEPHONE ENCOUNTER
Pending Prescriptions:                       Disp   Refills    metFORMIN (GLUCOPHAGE-XR) 500 MG 24 hr ta*270 ta*0            Sig: TAKE THREE TABLETS BY MOUTH EVERY DAY WITH DINNER    Medication is being filled for 1 time jill refill only due to:  Patient is due for diabetes follow up    Please call and help schedule.  Thank you!    Katina Henderson RN on 6/30/2021 at 5:20 PM

## 2021-06-30 NOTE — LETTER
July 1, 2021      Mera Valdivia  [address] [address] [address]        Dear Mera,     You are due to be seen to recheck your meds, please call and make an appt.       Sincerely,        William Elizondo, DO

## 2021-07-20 ENCOUNTER — OFFICE VISIT (OUTPATIENT)
Dept: INTERNAL MEDICINE | Facility: CLINIC | Age: 71
End: 2021-07-20
Payer: COMMERCIAL

## 2021-07-20 VITALS
DIASTOLIC BLOOD PRESSURE: 70 MMHG | HEART RATE: 90 BPM | HEIGHT: 65 IN | OXYGEN SATURATION: 97 % | WEIGHT: 177.4 LBS | BODY MASS INDEX: 29.56 KG/M2 | RESPIRATION RATE: 20 BRPM | SYSTOLIC BLOOD PRESSURE: 122 MMHG | TEMPERATURE: 96.8 F

## 2021-07-20 DIAGNOSIS — I10 BENIGN ESSENTIAL HYPERTENSION: ICD-10-CM

## 2021-07-20 DIAGNOSIS — E78.5 HYPERLIPIDEMIA LDL GOAL <100: Primary | ICD-10-CM

## 2021-07-20 DIAGNOSIS — Z12.11 SCREEN FOR COLON CANCER: ICD-10-CM

## 2021-07-20 DIAGNOSIS — E11.65 TYPE 2 DIABETES MELLITUS WITH HYPERGLYCEMIA, WITHOUT LONG-TERM CURRENT USE OF INSULIN (H): ICD-10-CM

## 2021-07-20 DIAGNOSIS — N18.31 STAGE 3A CHRONIC KIDNEY DISEASE (H): ICD-10-CM

## 2021-07-20 LAB
ALBUMIN UR-MCNC: NEGATIVE MG/DL
ANION GAP SERPL CALCULATED.3IONS-SCNC: 6 MMOL/L (ref 3–14)
APPEARANCE UR: ABNORMAL
BACTERIA #/AREA URNS HPF: ABNORMAL /HPF
BILIRUB UR QL STRIP: NEGATIVE
BUN SERPL-MCNC: 13 MG/DL (ref 7–30)
CALCIUM SERPL-MCNC: 9.5 MG/DL (ref 8.5–10.1)
CHLORIDE BLD-SCNC: 108 MMOL/L (ref 94–109)
CHOLEST SERPL-MCNC: 157 MG/DL
CO2 SERPL-SCNC: 26 MMOL/L (ref 20–32)
COLOR UR AUTO: YELLOW
CREAT SERPL-MCNC: 1.18 MG/DL (ref 0.52–1.04)
FASTING STATUS PATIENT QL REPORTED: YES
GFR SERPL CREATININE-BSD FRML MDRD: 46 ML/MIN/1.73M2
GLUCOSE BLD-MCNC: 156 MG/DL (ref 70–99)
GLUCOSE UR STRIP-MCNC: NEGATIVE MG/DL
HBA1C MFR BLD: 6.7 % (ref 0–5.6)
HDLC SERPL-MCNC: 45 MG/DL
HGB UR QL STRIP: NEGATIVE
KETONES UR STRIP-MCNC: NEGATIVE MG/DL
LDLC SERPL CALC-MCNC: 65 MG/DL
LEUKOCYTE ESTERASE UR QL STRIP: ABNORMAL
MUCOUS THREADS #/AREA URNS LPF: PRESENT /LPF
NITRATE UR QL: POSITIVE
NONHDLC SERPL-MCNC: 112 MG/DL
PH UR STRIP: 5 [PH] (ref 5–7)
POTASSIUM BLD-SCNC: 4.1 MMOL/L (ref 3.4–5.3)
RBC URINE: 1 /HPF
SODIUM SERPL-SCNC: 140 MMOL/L (ref 133–144)
SP GR UR STRIP: 1.01 (ref 1–1.03)
SQUAMOUS EPITHELIAL: 2 /HPF
TRIGL SERPL-MCNC: 234 MG/DL
UROBILINOGEN UR STRIP-MCNC: NORMAL MG/DL
WBC CLUMPS #/AREA URNS HPF: PRESENT /HPF
WBC URINE: 15 /HPF

## 2021-07-20 PROCEDURE — 80061 LIPID PANEL: CPT | Performed by: INTERNAL MEDICINE

## 2021-07-20 PROCEDURE — 81001 URINALYSIS AUTO W/SCOPE: CPT | Performed by: INTERNAL MEDICINE

## 2021-07-20 PROCEDURE — 36415 COLL VENOUS BLD VENIPUNCTURE: CPT | Performed by: INTERNAL MEDICINE

## 2021-07-20 PROCEDURE — 87086 URINE CULTURE/COLONY COUNT: CPT | Performed by: INTERNAL MEDICINE

## 2021-07-20 PROCEDURE — 99214 OFFICE O/P EST MOD 30 MIN: CPT | Performed by: INTERNAL MEDICINE

## 2021-07-20 PROCEDURE — 80048 BASIC METABOLIC PNL TOTAL CA: CPT | Performed by: INTERNAL MEDICINE

## 2021-07-20 PROCEDURE — 87186 SC STD MICRODIL/AGAR DIL: CPT | Performed by: INTERNAL MEDICINE

## 2021-07-20 PROCEDURE — 83036 HEMOGLOBIN GLYCOSYLATED A1C: CPT | Performed by: INTERNAL MEDICINE

## 2021-07-20 ASSESSMENT — MIFFLIN-ST. JEOR: SCORE: 1315.56

## 2021-07-20 ASSESSMENT — PAIN SCALES - GENERAL: PAINLEVEL: NO PAIN (0)

## 2021-07-20 NOTE — LETTER
July 26, 2021      Mera Valdivia  [address] [address] [address]        Dear ,    We are writing to inform you of your test results.      Urinary analysis confirms the presence of the E. coli urinary tract infection.  Triglycerides are slightly elevated at 243 but the LDL is excellent at 65 suggesting good lipid control.  Chemistry panel shows slight increase but stable kidney function.  Blood sugar was mildly elevated at 156.     You will be contacted with any outstanding results as they are available.   Feel free to contact me via the office or My Chart if you have any questions regarding the above.     William Elizondo DO    Resulted Orders   Basic metabolic panel  (Ca, Cl, CO2, Creat, Gluc, K, Na, BUN)   Result Value Ref Range    Sodium 140 133 - 144 mmol/L    Potassium 4.1 3.4 - 5.3 mmol/L    Chloride 108 94 - 109 mmol/L    Carbon Dioxide (CO2) 26 20 - 32 mmol/L    Anion Gap 6 3 - 14 mmol/L    Urea Nitrogen 13 7 - 30 mg/dL    Creatinine 1.18 (H) 0.52 - 1.04 mg/dL    Calcium 9.5 8.5 - 10.1 mg/dL    Glucose 156 (H) 70 - 99 mg/dL    GFR Estimate 46 (L) >60 mL/min/1.73m2      Comment:      As of July 11, 2021, eGFR is calculated by the CKD-EPI creatinine equation, without race adjustment. eGFR can be influenced by muscle mass, exercise, and diet. The reported eGFR is an estimation only and is only applicable if the renal function is stable.   UA Macro with Reflex to Micro and Culture - lab collect   Result Value Ref Range    Color Urine Yellow Colorless, Straw, Light Yellow, Yellow    Appearance Urine Slightly Cloudy (A) Clear    Glucose Urine Negative Negative mg/dL    Bilirubin Urine Negative Negative    Ketones Urine Negative Negative mg/dL    Specific Gravity Urine 1.013 1.003 - 1.035    Blood Urine Negative Negative    pH Urine 5.0 5.0 - 7.0    Protein Albumin Urine Negative Negative mg/dL    Urobilinogen Urine Normal Normal, 2.0 mg/dL    Nitrite Urine Positive (A) Negative     Leukocyte Esterase Urine Moderate (A) Negative    Bacteria Urine Moderate (A) None Seen /HPF    WBC Clumps Urine Present (A) None Seen /HPF    Mucus Urine Present (A) None Seen /LPF    RBC Urine 1 <=2 /HPF    WBC Urine 15 (H) <=5 /HPF    Squamous Epithelials Urine 2 (H) <=1 /HPF    Narrative    Urine Culture ordered based on laboratory criteria   Hemoglobin A1c   Result Value Ref Range    Hemoglobin A1C 6.7 (H) 0.0 - 5.6 %      Comment:      Normal <5.7%   Prediabetes 5.7-6.4%    Diabetes 6.5% or higher     Note: Adopted from ADA consensus guidelines.   Lipid panel reflex to direct LDL Fasting   Result Value Ref Range    Cholesterol 157 <200 mg/dL      Comment:      Age 0-19 years  Desirable: <170 mg/dL  Borderline high:  170-199 mg/dl  High:            >199 mg/dl    Age 20 years and older  Desirable: <200 mg/dL    Triglycerides 234 (H) <150 mg/dL      Comment:      0-9 years:  Normal:    Less than 75 mg/dL  Borderline high:  75-99 mg/dL  High:             Greater than or equal to 100 mg/dL    0-19 years:  Normal:    Less than 90 mg/dL  Borderline high:   mg/dL  High:             Greater than or equal to 130 mg/dL    20 years and older:  Normal:    Less than 150 mg/dL  Borderline high:  150-199 mg/dL  High:             200-499 mg/dL  Very high:   Greater than or equal to 500 mg/dL    Direct Measure HDL 45 (L) >=50 mg/dL      Comment:      0-19 years:       Greater than or equal to 45 mg/dL   Low: Less than 40 mg/dL   Borderline low: 40-44 mg/dL     20 years and older:   Female: Greater than or equal to 50 mg/dL   Male:   Greater than or equal to 40 mg/dL         LDL Cholesterol Calculated 65 <=100 mg/dL      Comment:      Age 0-19 years:  Desirable: 0-110 mg/dL   Borderline high: 110-129 mg/dL   High: >= 130 mg/dL    Age 20 years and older:  Desirable: <100mg/dL  Above desirable: 100-129 mg/dL   Borderline high: 130-159 mg/dL   High: 160-189 mg/dL   Very high: >= 190 mg/dL    Non HDL Cholesterol 112 <130 mg/dL       Comment:      0-19 years:  Desirable:          Less than 120 mg/dL  Borderline high:   120-144 mg/dL  High:                   Greater than or equal to 145 mg/dL    20 years and older:  Desirable:          130 mg/dL  Above Desirable: 130-159 mg/dL  Borderline high:   160-189 mg/dL  High:               190-219 mg/dL  Very high:     Greater than or equal to 220 mg/dL    Patient Fasting > 8hrs? Yes    Urine Culture   Result Value Ref Range    Culture >100,000 CFU/mL Escherichia coli (A)     Culture >100,000 CFU/mL Escherichia coli (A)        If you have any questions or concerns, please call the clinic at the number listed above.

## 2021-07-20 NOTE — PROGRESS NOTES
Tyler Tesfaye is a 71 year old who presents for the following health issues     HPI     Diabetes Follow-up      How often are you checking your blood sugar? Not at all    What concerns do you have today about your diabetes? None     Do you have any of these symptoms? (Select all that apply)  Numbness in feet      BP Readings from Last 2 Encounters:   07/20/21 122/70   12/29/20 138/74     Hemoglobin A1C (%)   Date Value   09/22/2020 6.8 (H)   11/01/2019 7.7 (H)     LDL Cholesterol Calculated (mg/dL)   Date Value   09/22/2020 75   03/02/2020     Cannot estimate LDL when triglyceride exceeds 400 mg/dL      EMR reviewed including:             Complaint, History of Chief Complaint, Corresponding Review of Systems, and Complaint Specific Physical Examination.    #1   Follow-up on type 2 diabetes.  Take medication compliantly.  Denies any diarrhea or side effects from Metformin.  Is appropriate on statin, ACE inhibitor, aspirin.  Denies polyuria or polydipsia.  Has had no episodes of hypoglycemia.       Exam:   LUNGS: clear bilaterally, airflow is brisk, no intercostal retraction or stridor is noted. No coughing is noted during visit.   HEART:  regular without rubs, clicks, gallops, or murmurs. PMI is nondisplaced. Upstrokes are brisk. S1,S2 are heard.   NEURO: Pt is alert and appropriate. No neurologic lateralization is noted. Cranial nerves 2-12 are intact. Peripheral sensory and motor function are grossly normal.    GI: Abdomen is soft, without rebound, guarding or tenderness. Bowel sounds are appropriate. No renal bruits are heard.      #2   Hypertension follow-up  Blood pressure 122/70.  Denies headache or neurologic symptoms.  Continues low-dose Norvasc with lisinopril without edema or cough.       Exam:  As above    #3   Follow-up on hyperlipidemia.  No history of vascular event  Continue statin medication without muscle pain.        Vital Signs:   /70 (BP Location: Right arm, Patient Position:  "Sitting, Cuff Size: Adult Regular)   Pulse 90   Temp 96.8  F (36  C) (Temporal)   Resp 20   Ht 1.651 m (5' 5\")   Wt 80.5 kg (177 lb 6.4 oz)   SpO2 97%   BMI 29.52 kg/m               Decision Making    Problem and Complexity     1. Type 2 diabetes mellitus with hyperglycemia, without long-term current use of insulin (H)  Check A1c and renal function.  - Hemoglobin A1c; Future  - Hemoglobin A1c    2. Stage 3a chronic kidney disease  Check renal function followed closely.  Continue low-dose ACE inhibitor.    3. Hyperlipidemia LDL goal <100  Check lipids and adjust statin accordingly  - Lipid panel reflex to direct LDL Fasting; Future  - Lipid panel reflex to direct LDL Fasting    4. Benign essential hypertension  Well-controlled.  5 electrolytes and renal function.  Check for urine protein.  - Basic metabolic panel  (Ca, Cl, CO2, Creat, Gluc, K, Na, BUN); Future  - UA Macro with Reflex to Micro and Culture - lab collect; Future  - Basic metabolic panel  (Ca, Cl, CO2, Creat, Gluc, K, Na, BUN)  - UA Macro with Reflex to Micro and Culture - lab collect  - Urine Culture    5. Screen for colon cancer  Screening for colon cancer.  - Fecal colorectal cancer screen (FIT); Future          ------------------------------------------------------------------------------------------------------------------------------  Data    4=1/3 5=2/3    1  >3  Specialty (external) notes reviewed:   None  Individual tests ordered (by provider) reviewed:   None  Individual tests ordered (by provider):   Multiple  Independent Historians Interview:   None  2  Review of outside (other providers) Tests:   None  3   Verbal Discussion with Specialists:    None    ----------------------------------------------------------------------------------------------------------------------------------  Risk   Prescription drug management:   Yes, ongoing                                High risk for toxicity:   Decision regarding surgery:    None   Social " determinants of health:   No   Decision regarding hospitalization:     None   Decision to withhold therapy:   None                               FOLLOW UP   I have asked the patient to make an appointment for followup with me in 6 months or as predicated by results            I have carefully explained the diagnosis and treatment options to the patient.  The patient has displayed an understanding of the above, and all subsequent questions were answered.      DO ROLO Elias    Portions of this note were produced using TAKO  Although every attempt at real-time proof reading has been made, occasional grammar/syntax errors may have been missed.

## 2021-07-23 LAB
BACTERIA UR CULT: ABNORMAL
BACTERIA UR CULT: ABNORMAL

## 2021-07-30 ENCOUNTER — TELEPHONE (OUTPATIENT)
Dept: INTERNAL MEDICINE | Facility: CLINIC | Age: 71
End: 2021-07-30

## 2021-07-30 DIAGNOSIS — N30.00 ACUTE CYSTITIS WITHOUT HEMATURIA: Primary | ICD-10-CM

## 2021-07-30 NOTE — TELEPHONE ENCOUNTER
Reason for Call:  Request for results:    Name of test or procedure: labs    Date of test of procedure: 7/20/21    Location of the test or procedure: [facility]    OK to leave the result message on voice mail or with a family member? Not Applicable    Phone number Patient can be reached at:  Home number on file [phone] (home)    Additional comments: patient calling did get these results in the mail, but has questions and would like to talk to her provider. Patient is aware that he is out until 8/02/21,patient is ok to wait until then to hear back     Call taken on 7/30/2021 at 4:02 PM by Margi Del Rosario

## 2021-08-01 RX ORDER — SULFAMETHOXAZOLE/TRIMETHOPRIM 800-160 MG
1 TABLET ORAL 2 TIMES DAILY
Qty: 14 TABLET | Refills: 0 | Status: SHIPPED | OUTPATIENT
Start: 2021-08-01 | End: 2021-08-12

## 2021-08-01 NOTE — TELEPHONE ENCOUNTER
The urinary culture does confirm the presence of an infection.  A prescription for an antibiotic has been called to your pharmacy.  We should recheck the urinary analysis upon conclusion of the antibiotic.    The remaining lab work was Unremarkable.  The cholesterol is well controlled with an LDL of 65.  The A1c was well controlled at 6.7 suggesting very good blood sugar control.  Chemistry panel shows slight decrease in kidney function which is unchanged over previous.  Blood sugar was 156.  Mat[place]in

## 2021-08-02 NOTE — TELEPHONE ENCOUNTER
Patient informed. No further questions at this time.   Lauren Garcia, CMA on 8/2/2021 at 8:43 AM

## 2021-08-08 DIAGNOSIS — E11.65 TYPE 2 DIABETES MELLITUS WITH HYPERGLYCEMIA, WITHOUT LONG-TERM CURRENT USE OF INSULIN (H): ICD-10-CM

## 2021-08-11 ENCOUNTER — TELEPHONE (OUTPATIENT)
Dept: INTERNAL MEDICINE | Facility: CLINIC | Age: 71
End: 2021-08-11

## 2021-08-11 ENCOUNTER — HOSPITAL ENCOUNTER (EMERGENCY)
Facility: CLINIC | Age: 71
Discharge: HOME | End: 2021-08-11
Attending: FAMILY MEDICINE | Admitting: EMERGENCY MEDICINE
Payer: COMMERCIAL

## 2021-08-11 VITALS
DIASTOLIC BLOOD PRESSURE: 70 MMHG | WEIGHT: 184.7 LBS | HEIGHT: 65 IN | RESPIRATION RATE: 16 BRPM | BODY MASS INDEX: 30.77 KG/M2 | TEMPERATURE: 100.4 F | HEART RATE: 80 BPM | OXYGEN SATURATION: 91 % | SYSTOLIC BLOOD PRESSURE: 135 MMHG

## 2021-08-11 DIAGNOSIS — R50.9 FEVER, UNSPECIFIED FEVER CAUSE: ICD-10-CM

## 2021-08-11 DIAGNOSIS — E87.1 HYPONATREMIA: ICD-10-CM

## 2021-08-11 LAB
ALBUMIN SERPL-MCNC: 3.6 G/DL (ref 3.4–5)
ALBUMIN UR-MCNC: 30 MG/DL
ALP SERPL-CCNC: 73 U/L (ref 40–150)
ALT SERPL W P-5'-P-CCNC: 26 U/L (ref 0–50)
ANION GAP SERPL CALCULATED.3IONS-SCNC: 8 MMOL/L (ref 3–14)
APPEARANCE UR: CLEAR
AST SERPL W P-5'-P-CCNC: 17 U/L (ref 0–45)
BASE EXCESS BLDV CALC-SCNC: -1.1 MMOL/L (ref -7.7–1.9)
BASOPHILS # BLD AUTO: 0.1 10E3/UL (ref 0–0.2)
BASOPHILS NFR BLD AUTO: 1 %
BILIRUB SERPL-MCNC: 1 MG/DL (ref 0.2–1.3)
BILIRUB UR QL STRIP: NEGATIVE
BUN SERPL-MCNC: 25 MG/DL (ref 7–30)
CALCIUM SERPL-MCNC: 8.9 MG/DL (ref 8.5–10.1)
CHLORIDE BLD-SCNC: 99 MMOL/L (ref 94–109)
CO2 SERPL-SCNC: 21 MMOL/L (ref 20–32)
COLOR UR AUTO: YELLOW
CREAT SERPL-MCNC: 1.57 MG/DL (ref 0.52–1.04)
CRP SERPL-MCNC: 52.4 MG/L (ref 0–8)
EOSINOPHIL # BLD AUTO: 0.1 10E3/UL (ref 0–0.7)
EOSINOPHIL NFR BLD AUTO: 1 %
ERYTHROCYTE [DISTWIDTH] IN BLOOD BY AUTOMATED COUNT: 13.3 % (ref 10–15)
GFR SERPL CREATININE-BSD FRML MDRD: 33 ML/MIN/1.73M2
GLUCOSE BLD-MCNC: 250 MG/DL (ref 70–99)
GLUCOSE BLDC GLUCOMTR-MCNC: 258 MG/DL (ref 70–99)
GLUCOSE UR STRIP-MCNC: 150 MG/DL
HCO3 BLDV-SCNC: 22 MMOL/L (ref 21–28)
HCT VFR BLD AUTO: 38.8 % (ref 35–47)
HGB BLD-MCNC: 13.3 G/DL (ref 11.7–15.7)
HGB UR QL STRIP: NEGATIVE
HYALINE CASTS: 1 /LPF
IMM GRANULOCYTES # BLD: 0.1 10E3/UL
IMM GRANULOCYTES NFR BLD: 1 %
KETONES UR STRIP-MCNC: 5 MG/DL
LACTATE SERPL-SCNC: 1.8 MMOL/L (ref 0.7–2)
LEUKOCYTE ESTERASE UR QL STRIP: ABNORMAL
LYMPHOCYTES # BLD AUTO: 0.9 10E3/UL (ref 0.8–5.3)
LYMPHOCYTES NFR BLD AUTO: 9 %
MCH RBC QN AUTO: 29.3 PG (ref 26.5–33)
MCHC RBC AUTO-ENTMCNC: 34.3 G/DL (ref 31.5–36.5)
MCV RBC AUTO: 86 FL (ref 78–100)
MONOCYTES # BLD AUTO: 0.5 10E3/UL (ref 0–1.3)
MONOCYTES NFR BLD AUTO: 5 %
MUCOUS THREADS #/AREA URNS LPF: PRESENT /LPF
NEUTROPHILS # BLD AUTO: 8.2 10E3/UL (ref 1.6–8.3)
NEUTROPHILS NFR BLD AUTO: 83 %
NITRATE UR QL: NEGATIVE
NRBC # BLD AUTO: 0 10E3/UL
NRBC BLD AUTO-RTO: 0 /100
O2/TOTAL GAS SETTING VFR VENT: 21 %
PCO2 BLDV: 33 MM HG (ref 40–50)
PH BLDV: 7.44 [PH] (ref 7.32–7.43)
PH UR STRIP: 5 [PH] (ref 5–7)
PLATELET # BLD AUTO: 97 10E3/UL (ref 150–450)
PO2 BLDV: 28 MM HG (ref 25–47)
POTASSIUM BLD-SCNC: 4.6 MMOL/L (ref 3.4–5.3)
PROT SERPL-MCNC: 7.1 G/DL (ref 6.8–8.8)
RBC # BLD AUTO: 4.54 10E6/UL (ref 3.8–5.2)
RBC URINE: 1 /HPF
SODIUM SERPL-SCNC: 128 MMOL/L (ref 133–144)
SP GR UR STRIP: 1.02 (ref 1–1.03)
SQUAMOUS EPITHELIAL: <1 /HPF
TSH SERPL DL<=0.005 MIU/L-ACNC: 0.92 MU/L (ref 0.4–4)
UROBILINOGEN UR STRIP-MCNC: 2 MG/DL
WBC # BLD AUTO: 9.8 10E3/UL (ref 4–11)
WBC URINE: 10 /HPF

## 2021-08-11 PROCEDURE — 93010 ELECTROCARDIOGRAM REPORT: CPT | Performed by: EMERGENCY MEDICINE

## 2021-08-11 PROCEDURE — 96360 HYDRATION IV INFUSION INIT: CPT | Performed by: EMERGENCY MEDICINE

## 2021-08-11 PROCEDURE — 82803 BLOOD GASES ANY COMBINATION: CPT | Performed by: FAMILY MEDICINE

## 2021-08-11 PROCEDURE — 86140 C-REACTIVE PROTEIN: CPT | Performed by: FAMILY MEDICINE

## 2021-08-11 PROCEDURE — 258N000003 HC RX IP 258 OP 636: Performed by: FAMILY MEDICINE

## 2021-08-11 PROCEDURE — 83605 ASSAY OF LACTIC ACID: CPT | Performed by: FAMILY MEDICINE

## 2021-08-11 PROCEDURE — 85025 COMPLETE CBC W/AUTO DIFF WBC: CPT | Performed by: FAMILY MEDICINE

## 2021-08-11 PROCEDURE — 84443 ASSAY THYROID STIM HORMONE: CPT | Performed by: FAMILY MEDICINE

## 2021-08-11 PROCEDURE — 80053 COMPREHEN METABOLIC PANEL: CPT | Performed by: FAMILY MEDICINE

## 2021-08-11 PROCEDURE — 36415 COLL VENOUS BLD VENIPUNCTURE: CPT | Performed by: FAMILY MEDICINE

## 2021-08-11 PROCEDURE — 99285 EMERGENCY DEPT VISIT HI MDM: CPT | Mod: 25 | Performed by: EMERGENCY MEDICINE

## 2021-08-11 PROCEDURE — 99284 EMERGENCY DEPT VISIT MOD MDM: CPT | Mod: 25 | Performed by: EMERGENCY MEDICINE

## 2021-08-11 PROCEDURE — 81001 URINALYSIS AUTO W/SCOPE: CPT | Performed by: FAMILY MEDICINE

## 2021-08-11 PROCEDURE — 93005 ELECTROCARDIOGRAM TRACING: CPT | Performed by: EMERGENCY MEDICINE

## 2021-08-11 RX ORDER — SODIUM CHLORIDE 9 MG/ML
INJECTION, SOLUTION INTRAVENOUS CONTINUOUS
Status: DISCONTINUED | OUTPATIENT
Start: 2021-08-11 | End: 2021-08-11 | Stop reason: HOSPADM

## 2021-08-11 RX ORDER — LISINOPRIL 10 MG/1
TABLET ORAL
Qty: 90 TABLET | Refills: 0 | Status: SHIPPED | OUTPATIENT
Start: 2021-08-11 | End: 2021-11-05

## 2021-08-11 RX ADMIN — SODIUM CHLORIDE 1000 ML: 9 INJECTION, SOLUTION INTRAVENOUS at 07:10

## 2021-08-11 ASSESSMENT — MIFFLIN-ST. JEOR: SCORE: 1348.67

## 2021-08-11 NOTE — DISCHARGE INSTRUCTIONS
Your sodium level is low.  This may be causing some of your unsteadiness.  Since you did not want to stay and be observed, be sure to drink plenty of fluids, plenty of water and can also drink low sugar Gatorade or Powerade.  Monitor your symptoms at home and return if they get worse    You had a fever here in the emergency room.  I do not see any clear indication of why you are running a fever.  If you notice that you continue to run a fever at home, which is a temperature of 100.4  F or above, you may take Tylenol or ibuprofen per bottle instructions, but you should contact your provider about this    It looks like your urine in the infection has improved with the antibiotic therapy.    You should follow-up with your primary provider, or be seen by someone in clinic within 2 days.  Registration was contacted to get a follow-up appointment or message sent.  If your symptoms worsen prior to this appointment, you should return promptly to the emergency room for evaluation.

## 2021-08-11 NOTE — TELEPHONE ENCOUNTER
Pending Prescriptions:                       Disp   Refills    lisinopril (ZESTRIL) 10 MG tablet [Pharmac*90 tab*0        Sig: TAKE ONE TABLET BY MOUTH ONCE DAILY      Routing refill request to provider for review/approval because:  Labs out of range:    Creatinine   Date Value Ref Range Status   08/11/2021 1.57 (H) 0.52 - 1.04 mg/dL Final   09/22/2020 1.22 (H) 0.52 - 1.04 mg/dL Final         Ashley Choudhary RN

## 2021-08-11 NOTE — TELEPHONE ENCOUNTER
"As the patient is fully aware and cognizant, I really cannot \"get her t to\" do anything.          If she would like to be evaluated and discuss her concerns, I would be happy to fit her in at 11 AM tomorrow morning.      Caro  "

## 2021-08-11 NOTE — ED TRIAGE NOTES
Arrives via EMS, family called d/t patient being unsteady on her feet and having difficulty with speech. This has since resolved and patient does not report any issues at the moment. Was flown out yesterday for stroke like symptoms but was able to return home. Patient is unsure where she was flown to.

## 2021-08-11 NOTE — ED PROVIDER NOTES
History     Chief Complaint   Patient presents with     Generalized Weakness     HPI  Signout received at change of shift from Dr. Reggie Echevarria. He saw the patient on arrival, and orders were placed. No acute findings on initial physical exam    Mera Valdivia is a 71 year old female who arrives via EMS for generalized weakness.  Was known from her home to [facility] for strokelike symptoms yesterday, had sustained a fall and zygomatic arch fracture.  Work-up in the ED was negative for stroke, no evidence of atrial fibrillation or arrhythmia.  Other than fracture findings on CT, MRI had been negative.  The patient was discharged home in improved condition.  Returns to the ED today for generalized weakness, being unsteady on her feet, and speech difficulty.  Has resolved since calling EMS and believe that patient is back to baseline.    Orders had already been placed for infectious work-up, as patient was febrile with temperature of 100.4  F on arrival.  She had recently been treated for UTI, started on Bactrim DS and indicates that she has finished this prescription.  Per chart review see this was started 8/2/21.    Patient was seen and examined, family members at bedside. She has no complaints at this time. Family members clarify that yesterday around 2 PM they had noticed she was having difficulty finishing sentences and seemed to be unsteady on her feet. She was flown to [facility] for work-up, which was negative. By the time they got home it was close to 10 PM. She was quite tired. She had something to eat and they noticed she was unsteady on her feet and seemingly weak. Thought that it was due to fatigue from a long day. This morning woke up at 5:30 AM and noticed again that she was having difficulty with speech. Mera has no complaints right now. She says that she has not had feelings of warmth or shaking chills, has not been running a fever at home. She feels fine right now. Denies any chest  pain, cough, shortness of breath, abdominal pain.    Allergies:  Allergies   Allergen Reactions     Nitrous Oxide      Doesn't recall any reaction to this drug       Problem List:    Patient Active Problem List    Diagnosis Date Noted     Closed nondisplaced fracture of greater trochanter of left femur, initial encounter (H) 12/02/2020     Priority: Medium     Closed nondisplaced intertrochanteric fracture of right femur with routine healing, subsequent encounter 06/22/2018     Priority: Medium     Osteopenia of multiple sites 10/11/2017     Priority: Medium     Type 2 diabetes mellitus with hyperglycemia, without long-term current use of insulin (H) 05/03/2017     Priority: Medium     Acute anterior circulation transient ischemic attack 10/29/2016     Priority: Medium     CKD (chronic kidney disease) stage 3, GFR 30-59 ml/min 10/29/2016     Priority: Medium     Benign essential hypertension 10/24/2016     Priority: Medium     Endometrial cells on cervical Pap smear inconsistent w/LMP 11/11/2015     Priority: Medium     11/11/15 pap NIL/neg   1/15/16 consult with Dr. Walden for endometrial cells on pap. Following.        Hyperlipidemia LDL goal <100 02/19/2015     Priority: Medium     Elevated triglycerides with high cholesterol 11/13/2012     Priority: Medium     Advanced directives, counseling/discussion 11/06/2012     Priority: Medium     Discussed advance care planning with patient; information given to patient to review.Luli Thakur LPN   11/6/2012             Past Medical History:    Past Medical History:   Diagnosis Date     Basal cell carcinoma      Endometrial cells on cervical Pap smear inconsistent w/LMP 11/11/15       Past Surgical History:    Past Surgical History:   Procedure Laterality Date     DILATION AND CURETTAGE, OPERATIVE HYSTEROSCOPY, COMBINED N/A 2/16/2016    Procedure: COMBINED DILATION AND CURETTAGE, OPERATIVE HYSTEROSCOPY;  Surgeon: Jenae Walden MD;  Location:  OR      FLEX  "SIGMOIDOSCOPY W/WO DELILAH SPEC BY BRUSH/WASH  1997     HC REVISE MEDIAN N/CARPAL TUNNEL SURG  1985?       Family History:    Family History   Problem Relation Age of Onset     Breast Cancer Sister      Cancer - colorectal Mother      Cardiovascular Father         QUADRUPLE BYPASS IN 1992       Social History:  Marital Status:   [2]  Social History     Tobacco Use     Smoking status: Never Smoker     Smokeless tobacco: Never Used   Vaping Use     Vaping Use: Never used   Substance Use Topics     Alcohol use: Yes     Alcohol/week: 0.0 standard drinks     Comment: once in a while     Drug use: No        Medications:    acetaminophen (TYLENOL) 500 MG tablet  alendronate (FOSAMAX) 70 MG tablet  amLODIPine (NORVASC) 5 MG tablet  aspirin 81 MG EC tablet  calcium-vitamin D (CALTRATE) 600-400 MG-UNIT per tablet  capsaicin (ZOSTRIX) 0.025 % external cream  ibuprofen (ADVIL/MOTRIN) 200 MG tablet  lisinopril (ZESTRIL) 10 MG tablet  metFORMIN (GLUCOPHAGE-XR) 500 MG 24 hr tablet  simvastatin (ZOCOR) 10 MG tablet  sulfamethoxazole-trimethoprim (BACTRIM DS) 800-160 MG tablet          Review of Systems   All other systems reviewed and are negative.      Physical Exam   BP: 136/59  Pulse: 90  Temp: 100.4  F (38  C)  Resp: 18  Height: 165.1 cm (5' 5\")  Weight: 83.8 kg (184 lb 11.2 oz)  SpO2: 95 %      Physical Exam  Vitals and nursing note reviewed.   Constitutional:       General: She is not in acute distress.     Appearance: She is obese. She is not diaphoretic.   HENT:      Head: Normocephalic and atraumatic.      Right Ear: External ear normal.      Left Ear: External ear normal.      Nose: Nose normal.   Eyes:      Pupils: Pupils are equal, round, and reactive to light.   Cardiovascular:      Rate and Rhythm: Normal rate and regular rhythm.   Pulmonary:      Effort: Pulmonary effort is normal.      Breath sounds: Normal breath sounds.   Abdominal:      Palpations: Abdomen is soft.   Skin:     General: Skin is warm and dry. "   Neurological:      General: No focal deficit present.      Mental Status: She is alert.      Cranial Nerves: No cranial nerve deficit.   Psychiatric:         Mood and Affect: Mood normal.         Behavior: Behavior normal.         ED Course        Procedures              EKG Interpretation:      Interpreted by Li Penny DO  Time reviewed: 0655  Symptoms at time of EKG: None   Rhythm: normal sinus   Rate: Normal and 89 bpm  Axis: Normal  Ectopy: none  Conduction: normal  ST Segments/ T Waves: No ST-T wave changes and No acute ischemic changes  Q Waves: none  Comparison to prior: Unchanged from 2016    Clinical Impression: normal EKG; EKG report in note from ED visit yesterday did not indicate any arrhythmia, unable to view tracing                Critical Care time:  none               Results for orders placed or performed during the hospital encounter of 08/11/21 (from the past 24 hour(s))   Glucose by meter   Result Value Ref Range    GLUCOSE BY METER POCT 258 (H) 70 - 99 mg/dL   CBC with platelets differential    Narrative    The following orders were created for panel order CBC with platelets differential.  Procedure                               Abnormality         Status                     ---------                               -----------         ------                     CBC with platelets and d...[[id number]]  Abnormal            Final result                 Please view results for these tests on the individual orders.   Comprehensive metabolic panel   Result Value Ref Range    Sodium 128 (L) 133 - 144 mmol/L    Potassium 4.6 3.4 - 5.3 mmol/L    Chloride 99 94 - 109 mmol/L    Carbon Dioxide (CO2) 21 20 - 32 mmol/L    Anion Gap 8 3 - 14 mmol/L    Urea Nitrogen 25 7 - 30 mg/dL    Creatinine 1.57 (H) 0.52 - 1.04 mg/dL    Calcium 8.9 8.5 - 10.1 mg/dL    Glucose 250 (H) 70 - 99 mg/dL    Alkaline Phosphatase 73 40 - 150 U/L    AST 17 0 - 45 U/L    ALT 26 0 - 50 U/L    Protein Total 7.1 6.8 - 8.8  g/dL    Albumin 3.6 3.4 - 5.0 g/dL    Bilirubin Total 1.0 0.2 - 1.3 mg/dL    GFR Estimate 33 (L) >60 mL/min/1.73m2   Lactic acid whole blood   Result Value Ref Range    Lactic Acid 1.8 0.7 - 2.0 mmol/L   TSH with free T4 reflex   Result Value Ref Range    TSH 0.92 0.40 - 4.00 mU/L   CRP inflammation   Result Value Ref Range    CRP Inflammation 52.4 (H) 0.0 - 8.0 mg/L   Blood gas venous   Result Value Ref Range    pH Venous 7.44 (H) 7.32 - 7.43    pCO2 Venous 33 (L) 40 - 50 mm Hg    pO2 Venous 28 25 - 47 mm Hg    Bicarbonate Venous 22 21 - 28 mmol/L    Base Excess/Deficit (+/-) -1.1 -7.7 - 1.9 mmol/L    FIO2 21    CBC with platelets and differential   Result Value Ref Range    WBC Count 9.8 4.0 - 11.0 10e3/uL    RBC Count 4.54 3.80 - 5.20 10e6/uL    Hemoglobin 13.3 11.7 - 15.7 g/dL    Hematocrit 38.8 35.0 - 47.0 %    MCV 86 78 - 100 fL    MCH 29.3 26.5 - 33.0 pg    MCHC 34.3 31.5 - 36.5 g/dL    RDW 13.3 10.0 - 15.0 %    Platelet Count 97 (L) 150 - 450 10e3/uL    % Neutrophils 83 %    % Lymphocytes 9 %    % Monocytes 5 %    % Eosinophils 1 %    % Basophils 1 %    % Immature Granulocytes 1 %    NRBCs per 100 WBC 0 <1 /100    Absolute Neutrophils 8.2 1.6 - 8.3 10e3/uL    Absolute Lymphocytes 0.9 0.8 - 5.3 10e3/uL    Absolute Monocytes 0.5 0.0 - 1.3 10e3/uL    Absolute Eosinophils 0.1 0.0 - 0.7 10e3/uL    Absolute Basophils 0.1 0.0 - 0.2 10e3/uL    Absolute Immature Granulocytes 0.1 (H) <=0.0 10e3/uL    Absolute NRBCs 0.0 10e3/uL   UA with Microscopic reflex to Culture    Specimen: Urine, Clean Catch   Result Value Ref Range    Color Urine Yellow Colorless, Straw, Light Yellow, Yellow    Appearance Urine Clear Clear    Glucose Urine 150  (A) Negative mg/dL    Bilirubin Urine Negative Negative    Ketones Urine 5  (A) Negative mg/dL    Specific Gravity Urine 1.021 1.003 - 1.035    Blood Urine Negative Negative    pH Urine 5.0 5.0 - 7.0    Protein Albumin Urine 30  (A) Negative mg/dL    Urobilinogen Urine 2.0 Normal, 2.0  mg/dL    Nitrite Urine Negative Negative    Leukocyte Esterase Urine Small (A) Negative    Mucus Urine Present (A) None Seen /LPF    RBC Urine 1 <=2 /HPF    WBC Urine 10 (H) <=5 /HPF    Squamous Epithelials Urine <1 <=1 /HPF    Hyaline Casts Urine 1 <=2 /LPF    Narrative    Urine Culture not indicated       Medications   0.9% sodium chloride BOLUS (0 mLs Intravenous Stopped 8/11/21 0806)       Assessments & Plan (with Medical Decision Making)  Mera is a 71-year-old female who arrived via EMS for concerns of speech difficulty and being unsteady on her feet.  See history and focused physical exam as above  Well-appearing 71-year-old female in no acute distress, vital signs are stable but she is febrile with a temperature of 100.4  F.  She states that she has not felt ill recently, but has been treated with an antibiotic for a urinary tract infection.  Has finished that antibiotic.  Was unable to give a urine sample right away, but we will retest her urine to make sure that antibiotic has improved her infection  Lab results as above.  Sodium is low at 128, and when she was seen yesterday at [facility] sodium was 130.  Has previously been within normal limits.  Urine appears improved, still has small leukocyte esterase and 10 white blood cells, but no longer has positive nitrites.  Does not have any urinary symptoms.  Remaining labs show an elevated CRP but are otherwise nonspecific.  Patient is reexamined.  She states that she feels fine.  Indicated that some of her symptoms may be due to the low sodium, but do not find any new worrisome causes in addition to the negative work-up that was done for stroke yesterday.  Offered to keep the patient here for observation, rehydration, and monitor for improvement.  Patient declined she does not want to stay in the hospital.  Family feel comfortable taking her home.  Discussed signs and symptoms that would indicate she needs to return promptly to the ER for evaluation,  otherwise she may follow-up with her primary provider on an outpatient basis.  She feels comfortable with this plan and is eager to be discharged.  Discharged in no acute distress     I have reviewed the nursing notes.    I have reviewed the findings, diagnosis, plan and need for follow up with the patient.       Discharge Medication List as of 8/11/2021  9:42 AM          Final diagnoses:   Hyponatremia   Fever, unspecified fever cause       8/11/2021   [facility] EMERGENCY DEPT     Li Penny DO  08/11/21 1824

## 2021-08-11 NOTE — TELEPHONE ENCOUNTER
Reason for Call:  Other     Detailed comments: patient was into hospital for MRI due to not formulating her words right and not walking right. They did MRI and brain scan that came out normal. They wanted her to stay over night to observe but she refused. Would like to speak with Dr Elizondo to see how they can get her to do this.     Phone Number Patient can be reached at: Home number on file [phone] (home)    Best Time: asap    Can we leave a detailed message on this number? YES    Call taken on 8/11/2021 at 3:47 PM by Natalia Waterman

## 2021-08-12 ENCOUNTER — OFFICE VISIT (OUTPATIENT)
Dept: INTERNAL MEDICINE | Facility: CLINIC | Age: 71
End: 2021-08-12
Payer: COMMERCIAL

## 2021-08-12 VITALS
OXYGEN SATURATION: 96 % | DIASTOLIC BLOOD PRESSURE: 82 MMHG | TEMPERATURE: 98.9 F | HEART RATE: 92 BPM | SYSTOLIC BLOOD PRESSURE: 112 MMHG | RESPIRATION RATE: 18 BRPM

## 2021-08-12 DIAGNOSIS — G45.9 TIA (TRANSIENT ISCHEMIC ATTACK): ICD-10-CM

## 2021-08-12 DIAGNOSIS — M62.81 GENERALIZED MUSCLE WEAKNESS: ICD-10-CM

## 2021-08-12 DIAGNOSIS — E11.65 TYPE 2 DIABETES MELLITUS WITH HYPERGLYCEMIA, WITHOUT LONG-TERM CURRENT USE OF INSULIN (H): Primary | ICD-10-CM

## 2021-08-12 DIAGNOSIS — S02.40FD CLOSED FRACTURE OF LEFT ZYGOMATIC ARCH WITH ROUTINE HEALING, SUBSEQUENT ENCOUNTER: ICD-10-CM

## 2021-08-12 PROCEDURE — 99214 OFFICE O/P EST MOD 30 MIN: CPT | Performed by: INTERNAL MEDICINE

## 2021-08-12 RX ORDER — LANCETS
EACH MISCELLANEOUS
Qty: 100 EACH | Refills: 6 | Status: SHIPPED | OUTPATIENT
Start: 2021-08-12

## 2021-08-12 RX ORDER — GLUCOSAMINE HCL/CHONDROITIN SU 500-400 MG
CAPSULE ORAL
Qty: 100 EACH | Refills: 3 | Status: SHIPPED | OUTPATIENT
Start: 2021-08-12 | End: 2023-11-21

## 2021-08-12 ASSESSMENT — PAIN SCALES - GENERAL: PAINLEVEL: NO PAIN (0)

## 2021-08-12 NOTE — PROGRESS NOTES
Tyler Tesfaye is a 71 year old who presents for the following health issues  accompanied by her spouse and sons:    HPI     ED/UC Followup:    Facility:  [facility] 8/10/21 and 8/12/21  Reason for visit: Stroke, facial injury, recent UTI  Current Status: improving             EMR reviewed including:             Complaint, History of Chief Complaint, Corresponding Review of Systems, and Complaint Specific Physical Examination.    #1   Follow-up of recent fall..  Patient lost consciousness and fell.  Strongly believes of had a stroke..  Had difficulty with word finding which has resolved now.  No evidence of head trauma was noted on CT however facial fracture was present.  Family reports that she had some confusion yesterday but this has now resolved.       Exam:   NEURO: Pt is alert and appropriate. No neurologic lateralization is noted. Cranial nerves 2-12 are intact. Peripheral sensory and motor function are grossly normal.    LUNGS: clear bilaterally, airflow is brisk, no intercostal retraction or stridor is noted. No coughing is noted during visit.   HEART:  regular without rubs, clicks, gallops, or murmurs. PMI is nondisplaced. Upstrokes are brisk. S1,S2 are heard.   PSYCH: The patient appears grossly appropriate. Maintains good eye contact, does not have any jittery or atypical motion. Displays appropriate affect.   MS: Minimal crepitance is noted in the extremities. No deformity is present.  No acute joint erythema or swelling is present.  Some minimal generalized muscle weakness is noted.  No lateralization is present.        #2   History of type 2 diabetes  Currently on Metformin 500 mg once daily.  Also on ACE inhibitor, aspirin, statin.  Notes no polyuria or polydipsia.  Denies any symptoms of severe hypoglycemia.       Exam:  As above        Vital Signs:   /82 (BP Location: Right arm, Patient Position: Sitting, Cuff Size: Adult Regular)   Pulse 92   Temp 98.9  F (37.2  C)  (Temporal)   Resp 18   SpO2 96%              Decision Making    Problem and Complexity     1. Type 2 diabetes mellitus with hyperglycemia, without long-term current use of insulin (H)  We will get appropriate glucose monitoring equipment.  Rule out hypoglycemia as possible cause for fall.    - blood glucose monitoring (NO BRAND SPECIFIED) meter device kit; Use to test blood sugar 2 times daily or as directed. Preferred blood glucose meter OR supplies to accompany: Blood Glucose Monitor Brands: per insurance.  Dispense: 1 kit; Refill: 0  - blood glucose (NO BRAND SPECIFIED) test strip; Use to test blood sugar 2 times daily or as directed. To accompany: Blood Glucose Monitor Brands: per insurance.  Dispense: 100 strip; Refill: 6  - blood glucose calibration (NO BRAND SPECIFIED) solution; To accompany: Blood Glucose Monitor Brands: per insurance.  Dispense: 1 each; Refill: 0  - thin (NO BRAND SPECIFIED) lancets; Use with lanceting device. To accompany: Blood Glucose Monitor Brands: per insurance.  Dispense: 100 each; Refill: 6  - alcohol swab prep pads; Use to swab area of injection/briseida as directed.  Dispense: 100 each; Refill: 3    2. Generalized muscle weakness  General muscle weakness is modest.  She does have difficulty toileting and DME will be obtained.    - Raised Toilet Seat Order for DME - ONLY FOR DME  - Toilet Safety Frame Order for DME - ONLY FOR DME   pany: Blood Glucose Monitor Brands: per insurance.  Dispense: 1 kit; Refill: 0  -  3. TIA (transient ischemic attack)  Currently stable without signs of recurrence.    4. Closed fracture of left zygomatic arch with routine healing, subsequent encounter  Observation and follow-up.       ------------------------------------------------------------------------------------------------------------------------------  Data    4=1/3 5=2/3    1  >3  Specialty (external) notes reviewed:   Yes  Individual tests ordered (by provider) reviewed:   No  Individual tests  ordered (by provider):   No  Independent Historians Interview:   Family present with extended story, history and questions  2  Review of outside (other providers) Tests:   Lab work and radiographs reviewed from [facility]  3   Verbal Discussion with Specialists:    None    ----------------------------------------------------------------------------------------------------------------------------------  Risk   Prescription drug management:   Ongoing                                High risk for toxicity:   Decision regarding surgery:    None   Social determinants of health:   None   Decision regarding hospitalization:     None   Decision to withhold therapy:   None                               FOLLOW UP   I have asked the patient to make an appointment for followup with me in 1 to 2 weeks            I have carefully explained the diagnosis and treatment options to the patient.  The patient has displayed an understanding of the above, and all subsequent questions were answered.      DO ROLO Elias    Portions of this note were produced using [employer]  Although every attempt at real-time proof reading has been made, occasional grammar/syntax errors may have been missed.

## 2021-08-13 ENCOUNTER — TELEPHONE (OUTPATIENT)
Dept: INTERNAL MEDICINE | Facility: CLINIC | Age: 71
End: 2021-08-13

## 2021-08-13 ENCOUNTER — APPOINTMENT (OUTPATIENT)
Dept: CT IMAGING | Facility: CLINIC | Age: 71
End: 2021-08-13
Attending: EMERGENCY MEDICINE
Payer: COMMERCIAL

## 2021-08-13 ENCOUNTER — HOSPITAL ENCOUNTER (EMERGENCY)
Facility: CLINIC | Age: 71
Discharge: HOME | End: 2021-08-13
Attending: EMERGENCY MEDICINE | Admitting: EMERGENCY MEDICINE
Payer: COMMERCIAL

## 2021-08-13 VITALS
TEMPERATURE: 98 F | DIASTOLIC BLOOD PRESSURE: 70 MMHG | HEART RATE: 71 BPM | OXYGEN SATURATION: 93 % | WEIGHT: 184 LBS | RESPIRATION RATE: 16 BRPM | SYSTOLIC BLOOD PRESSURE: 132 MMHG | BODY MASS INDEX: 30.62 KG/M2

## 2021-08-13 DIAGNOSIS — E87.1 HYPONATREMIA: ICD-10-CM
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
## 2021-08-13 DIAGNOSIS — R41.0 CONFUSION: ICD-10-CM

## 2021-08-13 LAB
ANION GAP SERPL CALCULATED.3IONS-SCNC: 8 MMOL/L (ref 3–14)
BASOPHILS # BLD AUTO: 0.1 10E3/UL (ref 0–0.2)
BASOPHILS NFR BLD AUTO: 1 %
BUN SERPL-MCNC: 26 MG/DL (ref 7–30)
CALCIUM SERPL-MCNC: 8.5 MG/DL (ref 8.5–10.1)
CHLORIDE BLD-SCNC: 98 MMOL/L (ref 94–109)
CO2 SERPL-SCNC: 22 MMOL/L (ref 20–32)
CREAT SERPL-MCNC: 1.29 MG/DL (ref 0.52–1.04)
EOSINOPHIL # BLD AUTO: 0.3 10E3/UL (ref 0–0.7)
EOSINOPHIL NFR BLD AUTO: 3 %
ERYTHROCYTE [DISTWIDTH] IN BLOOD BY AUTOMATED COUNT: 13.3 % (ref 10–15)
GFR SERPL CREATININE-BSD FRML MDRD: 41 ML/MIN/1.73M2
GLUCOSE BLD-MCNC: 107 MG/DL (ref 70–99)
HCT VFR BLD AUTO: 34.7 % (ref 35–47)
HGB BLD-MCNC: 12.2 G/DL (ref 11.7–15.7)
IMM GRANULOCYTES # BLD: 0.1 10E3/UL
IMM GRANULOCYTES NFR BLD: 1 %
LYMPHOCYTES # BLD AUTO: 1.2 10E3/UL (ref 0.8–5.3)
LYMPHOCYTES NFR BLD AUTO: 13 %
MCH RBC QN AUTO: 29.4 PG (ref 26.5–33)
MCHC RBC AUTO-ENTMCNC: 35.2 G/DL (ref 31.5–36.5)
MCV RBC AUTO: 84 FL (ref 78–100)
MONOCYTES # BLD AUTO: 0.7 10E3/UL (ref 0–1.3)
MONOCYTES NFR BLD AUTO: 7 %
NEUTROPHILS # BLD AUTO: 6.7 10E3/UL (ref 1.6–8.3)
NEUTROPHILS NFR BLD AUTO: 75 %
NRBC # BLD AUTO: 0 10E3/UL
NRBC BLD AUTO-RTO: 0 /100
PLATELET # BLD AUTO: 84 10E3/UL (ref 150–450)
POTASSIUM BLD-SCNC: 4.1 MMOL/L (ref 3.4–5.3)
RBC # BLD AUTO: 4.15 10E6/UL (ref 3.8–5.2)
SODIUM SERPL-SCNC: 128 MMOL/L (ref 133–144)
WBC # BLD AUTO: 9 10E3/UL (ref 4–11)

## 2021-08-13 PROCEDURE — 70450 CT HEAD/BRAIN W/O DYE: CPT

## 2021-08-13 PROCEDURE — 99283 EMERGENCY DEPT VISIT LOW MDM: CPT | Performed by: EMERGENCY MEDICINE

## 2021-08-13 PROCEDURE — 36415 COLL VENOUS BLD VENIPUNCTURE: CPT | Performed by: EMERGENCY MEDICINE

## 2021-08-13 PROCEDURE — 96360 HYDRATION IV INFUSION INIT: CPT | Mod: 59 | Performed by: EMERGENCY MEDICINE

## 2021-08-13 PROCEDURE — 258N000003 HC RX IP 258 OP 636: Performed by: EMERGENCY MEDICINE

## 2021-08-13 PROCEDURE — 80048 BASIC METABOLIC PNL TOTAL CA: CPT | Performed by: EMERGENCY MEDICINE

## 2021-08-13 PROCEDURE — 99285 EMERGENCY DEPT VISIT HI MDM: CPT | Mod: 25 | Performed by: EMERGENCY MEDICINE

## 2021-08-13 PROCEDURE — 70498 CT ANGIOGRAPHY NECK: CPT

## 2021-08-13 PROCEDURE — 250N000009 HC RX 250: Performed by: EMERGENCY MEDICINE

## 2021-08-13 PROCEDURE — 85025 COMPLETE CBC W/AUTO DIFF WBC: CPT | Performed by: EMERGENCY MEDICINE

## 2021-08-13 PROCEDURE — 250N000011 HC RX IP 250 OP 636: Performed by: EMERGENCY MEDICINE

## 2021-08-13 RX ORDER — IOPAMIDOL 755 MG/ML
100 INJECTION, SOLUTION INTRAVASCULAR ONCE
Status: COMPLETED | OUTPATIENT
Start: 2021-08-13 | End: 2021-08-13

## 2021-08-13 RX ADMIN — IOPAMIDOL 80 ML: 755 INJECTION, SOLUTION INTRAVENOUS at 03:36

## 2021-08-13 RX ADMIN — SODIUM CHLORIDE 1000 ML: 9 INJECTION, SOLUTION INTRAVENOUS at 03:38

## 2021-08-13 RX ADMIN — SODIUM CHLORIDE 100 ML: 9 INJECTION, SOLUTION INTRAVENOUS at 03:36

## 2021-08-13 ASSESSMENT — ENCOUNTER SYMPTOMS
ABDOMINAL PAIN: 0
SHORTNESS OF BREATH: 0
FEVER: 0

## 2021-08-13 NOTE — TELEPHONE ENCOUNTER
Per William Elizondo, DO, as long as they are able to wake her and she doesn't have an increase in confusion then she does not need to come in today. Sofi Oh MA     8/13/2021

## 2021-08-13 NOTE — ED TRIAGE NOTES
Patient fell out of bed approximately 1 hour ago, son reports patient fell this past Monday as well. Son reporting some confusion since last fall.

## 2021-08-13 NOTE — ED PROVIDER NOTES
History     Chief Complaint   Patient presents with     Fall     HPI  Mera Valdivia is a 71 year old female who has past medical history significant for hypertension, hyperlipidemia, diabetes, chronic kidney disease, presenting the emergency department with family members.  According to family members, patient had been sleeping, and subsequently awoke as patient had fallen out of bed.  Family members heard a clunk on the floor.  Patient had been found with her head leaning up against the wall and a picture frame on the counter which had been broken.  Patient had some bleeding from the metacarpal phalangeal joint area on the dorsum of the right hand.  No injury elsewhere.  Patient had denied any pain.  However, patient was noted to be confused, and therefore brought to the emergency department.  Patient upon arrival denies any pain.  However, patient is noted to have some garbled speech for myself.  According to the nurse, speech had been more fluent previously.    I reviewed previous medical records, and also had discussion with  who is present in the room.  Patient had been flown to [facility] 3 days ago for similar types of symptoms of garbled speech, and confusion.  CT scan of the head was negative, with subsequent MRI which was normal, and patient had been discharged home with TIA diagnosis.  Patient then presented to our emergency department for generalized weakness, unsteady on her feet with speech difficulty which had resolved prior to arrival.  Had noted to be febrile and slight hyponatremia at 128..  Offered admission to the hospital, however declined and was discharged home.  Had follow-up in clinic yesterday.  I am unable to view those notes as it has yet to be dictated.    Allergies:  Allergies   Allergen Reactions     Nitrous Oxide      Doesn't recall any reaction to this drug       Problem List:    Patient Active Problem List    Diagnosis Date Noted     Closed  nondisplaced fracture of greater trochanter of left femur, initial encounter (H) 12/02/2020     Priority: Medium     Closed nondisplaced intertrochanteric fracture of right femur with routine healing, subsequent encounter 06/22/2018     Priority: Medium     Osteopenia of multiple sites 10/11/2017     Priority: Medium     Type 2 diabetes mellitus with hyperglycemia, without long-term current use of insulin (H) 05/03/2017     Priority: Medium     Acute anterior circulation transient ischemic attack 10/29/2016     Priority: Medium     CKD (chronic kidney disease) stage 3, GFR 30-59 ml/min 10/29/2016     Priority: Medium     Benign essential hypertension 10/24/2016     Priority: Medium     Endometrial cells on cervical Pap smear inconsistent w/LMP 11/11/2015     Priority: Medium     11/11/15 pap NIL/neg   1/15/16 consult with Dr. Walden for endometrial cells on pap. Following.        Hyperlipidemia LDL goal <100 02/19/2015     Priority: Medium     Elevated triglycerides with high cholesterol 11/13/2012     Priority: Medium     Advanced directives, counseling/discussion 11/06/2012     Priority: Medium     Discussed advance care planning with patient; information given to patient to review.Luli Thakur LPN   11/6/2012             Past Medical History:    Past Medical History:   Diagnosis Date     Basal cell carcinoma      Endometrial cells on cervical Pap smear inconsistent w/LMP 11/11/15       Past Surgical History:    Past Surgical History:   Procedure Laterality Date     DILATION AND CURETTAGE, OPERATIVE HYSTEROSCOPY, COMBINED N/A 2/16/2016    Procedure: COMBINED DILATION AND CURETTAGE, OPERATIVE HYSTEROSCOPY;  Surgeon: Jenae Walden MD;  Location:  OR     HC FLEX SIGMOIDOSCOPY W/WO DELILAH SPEC BY BRUSH/WASH  1997     HC REVISE MEDIAN N/CARPAL TUNNEL SURG  1985?       Family History:    Family History   Problem Relation Age of Onset     Breast Cancer Sister      Cancer - colorectal Mother      Cardiovascular  Father         QUADRUPLE BYPASS IN 1992       Social History:  Marital Status:   [2]  Social History     Tobacco Use     Smoking status: Never Smoker     Smokeless tobacco: Never Used   Vaping Use     Vaping Use: Never used   Substance Use Topics     Alcohol use: Yes     Alcohol/week: 0.0 standard drinks     Comment: once in a while     Drug use: No        Medications:    acetaminophen (TYLENOL) 500 MG tablet  alcohol swab prep pads  alendronate (FOSAMAX) 70 MG tablet  amLODIPine (NORVASC) 5 MG tablet  aspirin 81 MG EC tablet  blood glucose (NO BRAND SPECIFIED) test strip  blood glucose calibration (NO BRAND SPECIFIED) solution  blood glucose monitoring (NO BRAND SPECIFIED) meter device kit  calcium-vitamin D (CALTRATE) 600-400 MG-UNIT per tablet  capsaicin (ZOSTRIX) 0.025 % external cream  ibuprofen (ADVIL/MOTRIN) 200 MG tablet  lisinopril (ZESTRIL) 10 MG tablet  metFORMIN (GLUCOPHAGE-XR) 500 MG 24 hr tablet  simvastatin (ZOCOR) 10 MG tablet  thin (NO BRAND SPECIFIED) lancets          Review of Systems   Constitutional: Negative for fever.   Respiratory: Negative for shortness of breath.    Cardiovascular: Negative for chest pain.   Gastrointestinal: Negative for abdominal pain.   Neurological:        See HPI   All other systems reviewed and are negative.      Physical Exam   BP: 128/75  Pulse: 74  Temp: 98  F (36.7  C)  Resp: 18  Weight: 83.5 kg (184 lb)  SpO2: 96 %      Physical Exam  /70   Pulse 71   Temp 98  F (36.7  C) (Oral)   Resp 28   Wt 83.5 kg (184 lb)   SpO2 93%   BMI 30.62 kg/m    General: alert, interactive, in no apparent distress.  Garbled speech present.  Head: atraumatic  Nose: no rhinorrhea or epistaxis  Ears: no external auditory canal discharge or bleeding.    Eyes: Sclera nonicteric. Conjunctiva noninjected. PERRL, EOMI  Mouth: no tonsillar erythema, edema, or exudate  Neck: supple, no palp LAD  Lungs: CTAB  CV: RRR, S1/S2; peripheral pulses palpable and symmetric  Abdomen:  soft, nt, nd, no guarding or rebound. Positive bowel sounds  Extremities: Right hand with superficial abrasion of the MCP area on the dorsum of the right hand of the second digit.  Normal range of motion, with no underlying bony tenderness.  Abrasion to the left knee is present.  Neuro: CN III-XII grossly intact, strength 5/5 in UE and LEs bilaterally, sensation intact to light touch in UE and LEs bilaterally;       ED Course        Procedures              Critical Care time:  none               Results for orders placed or performed during the hospital encounter of 08/13/21 (from the past 24 hour(s))   CBC with platelets differential    Narrative    The following orders were created for panel order CBC with platelets differential.  Procedure                               Abnormality         Status                     ---------                               -----------         ------                     CBC with platelets and d...[[id number]]  Abnormal            Final result                 Please view results for these tests on the individual orders.   Basic metabolic panel   Result Value Ref Range    Sodium 128 (L) 133 - 144 mmol/L    Potassium 4.1 3.4 - 5.3 mmol/L    Chloride 98 94 - 109 mmol/L    Carbon Dioxide (CO2) 22 20 - 32 mmol/L    Anion Gap 8 3 - 14 mmol/L    Urea Nitrogen 26 7 - 30 mg/dL    Creatinine 1.29 (H) 0.52 - 1.04 mg/dL    Calcium 8.5 8.5 - 10.1 mg/dL    Glucose 107 (H) 70 - 99 mg/dL    GFR Estimate 41 (L) >60 mL/min/1.73m2   CBC with platelets and differential   Result Value Ref Range    WBC Count 9.0 4.0 - 11.0 10e3/uL    RBC Count 4.15 3.80 - 5.20 10e6/uL    Hemoglobin 12.2 11.7 - 15.7 g/dL    Hematocrit 34.7 (L) 35.0 - 47.0 %    MCV 84 78 - 100 fL    MCH 29.4 26.5 - 33.0 pg    MCHC 35.2 31.5 - 36.5 g/dL    RDW 13.3 10.0 - 15.0 %    Platelet Count 84 (L) 150 - 450 10e3/uL    % Neutrophils 75 %    % Lymphocytes 13 %    % Monocytes 7 %    % Eosinophils 3 %    % Basophils 1 %    % Immature  Granulocytes 1 %    NRBCs per 100 WBC 0 <1 /100    Absolute Neutrophils 6.7 1.6 - 8.3 10e3/uL    Absolute Lymphocytes 1.2 0.8 - 5.3 10e3/uL    Absolute Monocytes 0.7 0.0 - 1.3 10e3/uL    Absolute Eosinophils 0.3 0.0 - 0.7 10e3/uL    Absolute Basophils 0.1 0.0 - 0.2 10e3/uL    Absolute Immature Granulocytes 0.1 (H) <=0.0 10e3/uL    Absolute NRBCs 0.0 10e3/uL   Head CT w/o contrast    Narrative    EXAM: CT HEAD W/O CONTRAST  LOCATION: [facility]  DATE/TIME: 8/13/2021 3:21 AM    INDICATION: Trauma - Head Injury  COMPARISON: 10/29/2016  TECHNIQUE: Routine CT Head without IV contrast. Multiplanar reformats. Dose reduction techniques were used.    FINDINGS:  INTRACRANIAL CONTENTS: No intracranial hemorrhage, extraaxial collection, or mass effect.  No CT evidence of acute infarct. Moderate presumed chronic small vessel ischemic changes. Mild generalized volume loss. No hydrocephalus.     VISUALIZED ORBITS/SINUSES/MASTOIDS: No intraorbital abnormality. No paranasal sinus mucosal disease. No middle ear or mastoid effusion.    BONES/SOFT TISSUES: There is a mildly comminuted and inwardly depressed fracture of the left zygomatic arch. No calvarial fracture.      Impression    IMPRESSION:  1.  No acute intracranial hemorrhage or calvarial fracture.  2.  Acute comminuted inwardly depressed fracture of the left zygomatic arch.  3.  Moderate presumed chronic small vessel ischemic changes.   CTA Head Neck with Contrast    Narrative    EXAM: CTA  HEAD NECK WITH CONTRAST  LOCATION: [facility]  DATE/TIME: 8/13/2021 3:27 AM    INDICATION: Stroke/TIA, assess intracranial arteries.  COMPARISON: Noncontrast head CT same day.  CONTRAST: 80 mL-Isovue 370  TECHNIQUE: Head and neck CT angiogram with IV contrast. Axial helical CT images of the head and neck vessels obtained during the arterial phase of intravenous contrast administration. Axial 2D reconstructed images and  multiplanar 3D MIP reconstructed   images of the head and neck vessels were performed by the technologist. Dose reduction techniques were used. All stenosis measurements made according to NASCET criteria unless otherwise specified.    FINDINGS:     HEAD CTA:  ANTERIOR CIRCULATION: No major vessel intraluminal filling defect, flow-limiting stenosis or occlusion. Within the limits of CTA, no convincing aneurysm or high-flow vascular lesion. Standard [place] anatomy.    POSTERIOR CIRCULATION: No stenosis/occlusion, aneurysm, or high-flow vascular malformation. Balanced vertebral arteries supply a normal basilar artery.     DURAL VENOUS SINUSES: Expected enhancement of the major dural venous sinuses.    NECK CTA:  RIGHT CAROTID: Minimal atheromatous change. No measurable stenosis or dissection.    LEFT CAROTID: No measurable stenosis or dissection.    VERTEBRAL ARTERIES: No focal stenosis or dissection. Balanced vertebral arteries.    AORTIC ARCH: Classic aortic arch anatomy with no significant stenosis at the origin of the great vessels.    NONVASCULAR STRUCTURES: There is redemonstration of a heterogeneous mass within the superficial lobe of the right parotid gland measuring 2.3 x 2 x 1.8 cm. This is minimally larger compared with 10/29/2016.      Impression    IMPRESSION:   HEAD CTA:   1.  No major vessel acute thromboembolism, flow-limiting stenosis or occlusion.    NECK CTA:  1.  No measurable stenosis in either proximal internal carotid artery.  2.  The vertebral arteries are uniformly patent through the neck into head.  3.  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 It is minimally larger compared with the study from 10/29/2016 which would favor a nonaggressive etiology. Imaging alone cannot distinguish between benign and   malignant primary salivary gland neoplasms. If not already characterized, would recommend an ultrasound and ultrasound-guided FNA for  further evaluation.       Medications   0.9% sodium chloride BOLUS (0 mLs Intravenous Stopped 8/13/21 0448)   sodium chloride (PF) 0.9% PF flush 3 mL (3 mLs Intracatheter Given 8/13/21 0336)   sodium chloride 0.9 % bag 100mL for CT scan flush use (100 mLs Intravenous Given 8/13/21 0336)   iopamidol (ISOVUE-370) solution 100 mL (80 mLs Intravenous Given 8/13/21 0336)       Assessments & Plan (with Medical Decision Making)  71 year old female with past medical history significant for hypertension, hyperlipidemia, diabetes, chronic kidney disease, with multiple recent healthcare visits including visits each day over the past 3 days for various reasons including strokelike symptoms 3 days ago for which patient had been flown to [facility].  MRI had been normal and patient discharged home with TIA diagnosis.  Patient subsequently seen 2 days ago in the emergency department.  Slight hyponatremia at 128, and febrile at 100.4 degrees with no obvious infectious source.  Discharged home and subsequently followed up in clinic yesterday.    Patient presents to the emergency department today hemodynamically stable, afebrile, with garbled speech upon arrival.  Otherwise normal neurologic exam with normal strength, normal sensation, and normal extremity exam.  Only notable exam finding is confusion and garbled speech.  Electrolytes will be checked to evaluate for potential electrolyte abnormality contributing to symptoms.  Additionally CT scan of the head with CT angiogram will be performed.  Upon review of outside records it does not appear patient has had any vessel imaging as she had only MRI and noncontrast CT.  Therefore CT angiogram was performed today.    Patient with laboratory work-up showing sodium level which is the same compared to prior.  I do not feel that hyponatremia is significantly causing her symptoms.  No other acute electrolyte abnormality.  Creatinine is approximately at baseline at 1.29.   CBC is otherwise normal.  No evidence of white blood cell count elevation.  No other infectious symptoms.    CT scan of the head with CT angiogram without acute findings.  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  Likely benign.    Patient has appointment at 4 PM this afternoon with primary care provider.  Patient could potentially be experiencing sundowning.  The fall today does not appear to have resulted in any acute posttraumatic injury.    I discussed potential for admission/observation in the hospital, however no neurologist present in the hospital, and patient does have recent similar episode at [facility] where MRI was negative.  Therefore I feel further observation in her hospital without additional testing likely to find other alternative etiology of symptoms would be of little benefit.  Outpatient neurology referral has been placed.  They are otherwise encouraged to return if new or worsening symptoms develop.     I have reviewed the nursing notes.    I have reviewed the findings, diagnosis, plan and need for follow up with the patient.       New Prescriptions    No medications on file       Final diagnoses:   Fall, initial encounter   Confusion   Hyponatremia       8/13/2021   [facility] EMERGENCY DEPT     Zeus Bran MD  08/13/21 0518

## 2021-08-13 NOTE — TELEPHONE ENCOUNTER
Mera's son Wellington called to say she is sleeping a lot and wants to know if she should come  in or not.

## 2021-08-17 ENCOUNTER — OFFICE VISIT (OUTPATIENT)
Dept: INTERNAL MEDICINE | Facility: CLINIC | Age: 71
End: 2021-08-17
Payer: COMMERCIAL

## 2021-08-17 VITALS
RESPIRATION RATE: 18 BRPM | DIASTOLIC BLOOD PRESSURE: 78 MMHG | HEART RATE: 70 BPM | OXYGEN SATURATION: 98 % | TEMPERATURE: 98.3 F | SYSTOLIC BLOOD PRESSURE: 122 MMHG

## 2021-08-17 DIAGNOSIS — E11.9 TYPE 2 DIABETES MELLITUS WITHOUT COMPLICATION, WITHOUT LONG-TERM CURRENT USE OF INSULIN (H): ICD-10-CM

## 2021-08-17 DIAGNOSIS — I10 BENIGN ESSENTIAL HYPERTENSION: ICD-10-CM

## 2021-08-17 DIAGNOSIS — G45.8 ACUTE ANTERIOR CIRCULATION TRANSIENT ISCHEMIC ATTACK: Primary | ICD-10-CM

## 2021-08-17 PROCEDURE — 99214 OFFICE O/P EST MOD 30 MIN: CPT | Performed by: INTERNAL MEDICINE

## 2021-08-17 ASSESSMENT — PAIN SCALES - GENERAL: PAINLEVEL: NO PAIN (0)

## 2021-08-17 NOTE — PROGRESS NOTES
Tyler Tesfaye is a 71 year old who presents for the following health issues     HPI     ED/UC Followup:    Facility:  [facility]  Date of visit: 8/13/21  Reason for visit: Fell out of bed  Current Status: much improvement         EMR reviewed including:             Complaint, History of Chief Complaint, Corresponding Review of Systems, and Complaint Specific Physical Examination.    #1   Recent fall out of bed.  No subsequent injury.  Emergency Medicine notes reviewed.  Currently alert, conversant, and in good spirits.  Acknowledges her strength has markedly improved over the last several days.  Is able to walk with some assistance of a walker.  No further vertigo.       Exam:   NEURO: Pt is alert and appropriate. No neurologic lateralization is noted. Cranial nerves 2-12 are intact. Peripheral sensory and motor function are grossly normal.    PSYCH: The patient appears grossly appropriate. Maintains good eye contact, does not have any jittery or atypical motion. Displays appropriate affect.      #2   Hyponatremia  Stable at 128.  No symptoms.  Renal function markedly improved with GFR 41 up from 33.  Continue observation.  Recommend reasonable fluid restriction.      #3   Recent stroke.  Symptoms completely resolved.  No difficulty with word finding or dysphasia  Good memory, mentation and mood        Vital Signs:   /78 (BP Location: Right arm, Patient Position: Sitting, Cuff Size: Adult Regular)   Pulse 70   Temp 98.3  F (36.8  C) (Temporal)   Resp 18   SpO2 98%              Decision Making    Problem and Complexity  1. Acute anterior circulation transient ischemic attack  Symptoms resolved    2. Benign essential hypertension  Blood pressure controlled    3. Type 2 diabetes mellitus without complication, without long-term current use of insulin (H)  Last A1c 6.7.  Blood sugars controlled on Metformin  alone.        ------------------------------------------------------------------------------------------------------------------------------  Data    4=1/3 5=2/3    1  >3  Specialty (external) notes reviewed:   Emergency medicine notes appreciated again  Individual tests ordered (by provider) reviewed:   None  Individual tests ordered (by provider):   None  Independent Historians Interview:    and son present giving over half the history with several questions  2  Review of outside (other providers) Tests:   Lab work and radiographs reviewed from recent admission and ER visits  3   Verbal Discussion with Specialists:    None    ----------------------------------------------------------------------------------------------------------------------------------  Risk   Prescription drug management:   Ongoing                                High risk for toxicity:   Decision regarding surgery:    None   Social determinants of health:   No   Decision regarding hospitalization:     None   Decision to withhold therapy:   None                                   FOLLOW UP   I have asked the patient to make an appointment for followup with me in 1 month for recheck of sodium and electrolytes            I have carefully explained the diagnosis and treatment options to the patient.  The patient has displayed an understanding of the above, and all subsequent questions were answered.      DO ROLO Elias    Portions of this note were produced using [employer]  Although every attempt at real-time proof reading has been made, occasional grammar/syntax errors may have been missed.          .

## 2021-09-02 DIAGNOSIS — E78.5 HYPERLIPIDEMIA LDL GOAL <100: ICD-10-CM

## 2021-09-03 RX ORDER — AMLODIPINE BESYLATE 5 MG/1
TABLET ORAL
Qty: 90 TABLET | Refills: 1 | Status: SHIPPED | OUTPATIENT
Start: 2021-09-03 | End: 2022-02-23

## 2021-09-03 NOTE — TELEPHONE ENCOUNTER
"Routing refill request to provider for review/approval because:  Labs out of range:  CR        Requested Prescriptions   Pending Prescriptions Disp Refills     amLODIPine (NORVASC) 5 MG tablet [Pharmacy Med Name: AMLODIPINE 5MG TAB] 90 tablet 1     Sig: TAKE ONE TABLET BY MOUTH ONCE DAILY   LOV 8/17/2021    Calcium Channel Blockers Protocol  Failed - 9/2/2021  9:28 AM        Failed - Normal serum creatinine on file in past 12 months     Recent Labs   Lab Test 08/13/21  0317   CR 1.29*       Ok to refill medication if creatinine is low          Passed - Blood pressure under 140/90 in past 12 months     BP Readings from Last 3 Encounters:   08/17/21 122/78   08/13/21 132/70   08/12/21 112/82                 Passed - Recent (12 mo) or future (30 days) visit within the authorizing provider's specialty     Patient has had an office visit with the authorizing provider or a provider within the authorizing providers department within the previous 12 mos or has a future within next 30 days. See \"Patient Info\" tab in inbasket, or \"Choose Columns\" in Meds & Orders section of the refill encounter.              Passed - Medication is active on med list        Passed - Patient is age 18 or older        Passed - No active pregnancy on record        Passed - No positive pregnancy test in past 12 months         Mayda Molina RN      "

## 2021-09-16 DIAGNOSIS — E11.65 TYPE 2 DIABETES MELLITUS WITH HYPERGLYCEMIA, WITHOUT LONG-TERM CURRENT USE OF INSULIN (H): ICD-10-CM

## 2021-09-16 DIAGNOSIS — E78.5 HYPERLIPIDEMIA LDL GOAL <100: ICD-10-CM

## 2021-09-17 RX ORDER — SIMVASTATIN 10 MG
TABLET ORAL
Qty: 90 TABLET | Refills: 2 | Status: SHIPPED | OUTPATIENT
Start: 2021-09-17 | End: 2022-06-14

## 2021-09-17 NOTE — TELEPHONE ENCOUNTER
"  Requested Prescriptions   Pending Prescriptions Disp Refills     simvastatin (ZOCOR) 10 MG tablet [Pharmacy Med Name: SIMVASTATIN 10MG TABS] 90 tablet 1     Sig: TAKE ONE TABLET BY MOUTH AT BEDTIME   8/17/2021    Statins Protocol Passed - 9/16/2021  8:57 AM        Passed - LDL on file in past 12 months     Recent Labs   Lab Test 07/20/21  0847   LDL 65             Passed - No abnormal creatine kinase in past 12 months     No lab results found.             Passed - Recent (12 mo) or future (30 days) visit within the authorizing provider's specialty     Patient has had an office visit with the authorizing provider or a provider within the authorizing providers department within the previous 12 mos or has a future within next 30 days. See \"Patient Info\" tab in inbasket, or \"Choose Columns\" in Meds & Orders section of the refill encounter.              Passed - Medication is active on med list        Passed - Patient is age 18 or older        Passed - No active pregnancy on record        Passed - No positive pregnancy test in past 12 months           Prescription approved per [facility] Refill Protocol.  Mayda Molina RN    "

## 2021-09-20 DIAGNOSIS — E11.65 TYPE 2 DIABETES MELLITUS WITH HYPERGLYCEMIA, WITHOUT LONG-TERM CURRENT USE OF INSULIN (H): ICD-10-CM

## 2021-09-22 RX ORDER — METFORMIN HCL 500 MG
TABLET, EXTENDED RELEASE 24 HR ORAL
Qty: 270 TABLET | Refills: 0 | Status: SHIPPED | OUTPATIENT
Start: 2021-09-22 | End: 2021-12-21

## 2021-09-22 NOTE — TELEPHONE ENCOUNTER
"  Requested Prescriptions   Pending Prescriptions Disp Refills     metFORMIN (GLUCOPHAGE-XR) 500 MG 24 hr tablet [Pharmacy Med Name: METFORMIN HCL ER 500MG TB24] 270 tablet 0     Sig: TAKE THREE TABLETS BY MOUTH EVERY DAY WITH DINNER   8/17/2021    Biguanide Agents Passed - 9/20/2021  1:56 PM        Passed - Patient is age 10 or older        Passed - Patient has documented A1c within the specified period of time.     If HgbA1C is 8 or greater, it needs to be on file within the past 3 months.  If less than 8, must be on file within the past 6 months.     Recent Labs   Lab Test 07/20/21  0847   A1C 6.7*             Passed - Patient's CR is NOT>1.4 OR Patient's EGFR is NOT<45 within past 12 mos.     Recent Labs   Lab Test 08/13/21 0317 07/20/21  0847 09/22/20  0930   GFRESTIMATED 41*   < > 44*   GFRESTBLACK  --   --  52*    < > = values in this interval not displayed.       Recent Labs   Lab Test 08/13/21 0317   CR 1.29*             Passed - Patient does NOT have a diagnosis of CHF.        Passed - Medication is active on med list        Passed - Patient is not pregnant        Passed - Patient has not had a positive pregnancy test within the past 12 mos.         Passed - Recent (6 mo) or future (30 days) visit within the authorizing provider's specialty     Patient had office visit in the last 6 months or has a visit in the next 30 days with authorizing provider or within the authorizing provider's specialty.  See \"Patient Info\" tab in inbasket, or \"Choose Columns\" in Meds & Orders section of the refill encounter.             Prescription approved per [facility] Refill Protocol.  Mayda Molina RN      "

## 2021-11-04 DIAGNOSIS — E11.65 TYPE 2 DIABETES MELLITUS WITH HYPERGLYCEMIA, WITHOUT LONG-TERM CURRENT USE OF INSULIN (H): ICD-10-CM

## 2021-11-05 RX ORDER — LISINOPRIL 10 MG/1
TABLET ORAL
Qty: 90 TABLET | Refills: 0 | Status: SHIPPED | OUTPATIENT
Start: 2021-11-05 | End: 2022-02-04

## 2021-11-05 NOTE — TELEPHONE ENCOUNTER
Lisinopril  Routing refill request to provider for review/approval because:  Labs out of range:  CR    Mayda Molina RN

## 2021-12-19 DIAGNOSIS — E11.65 TYPE 2 DIABETES MELLITUS WITH HYPERGLYCEMIA, WITHOUT LONG-TERM CURRENT USE OF INSULIN (H): ICD-10-CM

## 2021-12-21 RX ORDER — METFORMIN HCL 500 MG
TABLET, EXTENDED RELEASE 24 HR ORAL
Qty: 270 TABLET | Refills: 0 | Status: SHIPPED | OUTPATIENT
Start: 2021-12-21 | End: 2022-03-22

## 2021-12-21 NOTE — TELEPHONE ENCOUNTER
Metformin  Prescription approved per [facility] Refill Protocol.    Mayda Molina RN

## 2022-02-03 DIAGNOSIS — E11.65 TYPE 2 DIABETES MELLITUS WITH HYPERGLYCEMIA, WITHOUT LONG-TERM CURRENT USE OF INSULIN (H): ICD-10-CM

## 2022-02-04 RX ORDER — LISINOPRIL 10 MG/1
TABLET ORAL
Qty: 90 TABLET | Refills: 0 | Status: SHIPPED | OUTPATIENT
Start: 2022-02-04 | End: 2022-05-10

## 2022-02-04 NOTE — TELEPHONE ENCOUNTER
Pending Prescriptions:                       Disp   Refills    lisinopril (ZESTRIL) 10 MG tablet [Pharmac*90 tab*0        Sig: TAKE ONE TABLET BY MOUTH ONCE DAILY    Routing refill request to provider for review/approval because:  Labs out of range:    Creatinine   Date Value Ref Range Status   08/13/2021 1.29 (H) 0.52 - 1.04 mg/dL Final   09/22/2020 1.22 (H) 0.52 - 1.04 mg/dL Final

## 2022-02-23 DIAGNOSIS — E78.5 HYPERLIPIDEMIA LDL GOAL <100: ICD-10-CM

## 2022-02-23 RX ORDER — AMLODIPINE BESYLATE 5 MG/1
TABLET ORAL
Qty: 90 TABLET | Refills: 1 | Status: SHIPPED | OUTPATIENT
Start: 2022-02-23 | End: 2022-06-15

## 2022-02-23 NOTE — TELEPHONE ENCOUNTER
Pending Prescriptions:                       Disp   Refills    amLODIPine (NORVASC) 5 MG tablet [Pharmacy*90 tab*1        Sig: TAKE ONE TABLET BY MOUTH ONCE DAILY    Routing refill request to provider for review/approval because:  Labs out of range:    Creatinine   Date Value Ref Range Status   08/13/2021 1.29 (H) 0.52 - 1.04 mg/dL Final   09/22/2020 1.22 (H) 0.52 - 1.04 mg/dL Final

## 2022-02-27 ENCOUNTER — APPOINTMENT (OUTPATIENT)
Dept: GENERAL RADIOLOGY | Facility: CLINIC | Age: 72
End: 2022-02-27
Attending: EMERGENCY MEDICINE
Payer: COMMERCIAL

## 2022-02-27 ENCOUNTER — HOSPITAL ENCOUNTER (EMERGENCY)
Facility: CLINIC | Age: 72
Discharge: HOME | End: 2022-02-27
Attending: EMERGENCY MEDICINE | Admitting: EMERGENCY MEDICINE
Payer: COMMERCIAL

## 2022-02-27 VITALS
TEMPERATURE: 98.6 F | HEART RATE: 92 BPM | BODY MASS INDEX: 30.62 KG/M2 | DIASTOLIC BLOOD PRESSURE: 83 MMHG | OXYGEN SATURATION: 98 % | RESPIRATION RATE: 16 BRPM | SYSTOLIC BLOOD PRESSURE: 162 MMHG | WEIGHT: 184 LBS

## 2022-02-27 DIAGNOSIS — S42.291A HUMERAL HEAD FRACTURE, RIGHT, CLOSED, INITIAL ENCOUNTER: ICD-10-CM

## 2022-02-27 PROCEDURE — 23600 CLTX PROX HUMRL FX W/O MNPJ: CPT | Mod: 54 | Performed by: EMERGENCY MEDICINE

## 2022-02-27 PROCEDURE — 99284 EMERGENCY DEPT VISIT MOD MDM: CPT | Mod: 25 | Performed by: EMERGENCY MEDICINE

## 2022-02-27 PROCEDURE — 23600 CLTX PROX HUMRL FX W/O MNPJ: CPT | Mod: RT | Performed by: EMERGENCY MEDICINE

## 2022-02-27 PROCEDURE — 73030 X-RAY EXAM OF SHOULDER: CPT | Mod: RT

## 2022-02-27 PROCEDURE — 73502 X-RAY EXAM HIP UNI 2-3 VIEWS: CPT

## 2022-02-27 PROCEDURE — 99285 EMERGENCY DEPT VISIT HI MDM: CPT | Mod: 25 | Performed by: EMERGENCY MEDICINE

## 2022-02-27 PROCEDURE — 73060 X-RAY EXAM OF HUMERUS: CPT | Mod: RT

## 2022-02-27 NOTE — ED PROVIDER NOTES
History     Chief Complaint   Patient presents with     Arm Pain     HPI  Mera Valdivia is a 71 year old female who presents for evaluation of acute injury to her right shoulder and upper arm.  She tripped over the chair Thursday at her home.  Caused her to fall forward landing on a hardwood surface against her right shoulder and upper arm.  She is on antiplatelet therapy with aspirin.  She has noticed increased ecchymosis over the last few days overlying the shoulder and arm.  She is now having increased stiffness and soreness with movement.  Denies hitting her head.  No neck pain.  Denies any chest pain.  No shortness of breath or painful breathing.  She has been ambulatory with no spinal pain pelvic or lower extremity pain.    Allergies:  Allergies   Allergen Reactions     Nitrous Oxide      Doesn't recall any reaction to this drug       Problem List:    Patient Active Problem List    Diagnosis Date Noted     Closed nondisplaced fracture of greater trochanter of left femur, initial encounter (H) 12/02/2020     Priority: Medium     Closed nondisplaced intertrochanteric fracture of right femur with routine healing, subsequent encounter 06/22/2018     Priority: Medium     Osteopenia of multiple sites 10/11/2017     Priority: Medium     Type 2 diabetes mellitus with hyperglycemia, without long-term current use of insulin (H) 05/03/2017     Priority: Medium     Acute anterior circulation transient ischemic attack 10/29/2016     Priority: Medium     CKD (chronic kidney disease) stage 3, GFR 30-59 ml/min (H) 10/29/2016     Priority: Medium     Benign essential hypertension 10/24/2016     Priority: Medium     Endometrial cells on cervical Pap smear inconsistent w/LMP 11/11/2015     Priority: Medium     11/11/15 pap NIL/neg   1/15/16 consult with Dr. Walden for endometrial cells on pap. Following.        Hyperlipidemia LDL goal <100 02/19/2015     Priority: Medium     Elevated triglycerides with high cholesterol  11/13/2012     Priority: Medium     Advanced directives, counseling/discussion 11/06/2012     Priority: Medium     Discussed advance care planning with patient; information given to patient to review.Luli Thakur MOHAMUD   11/6/2012             Past Medical History:    Past Medical History:   Diagnosis Date     Basal cell carcinoma      Endometrial cells on cervical Pap smear inconsistent w/LMP 11/11/15       Past Surgical History:    Past Surgical History:   Procedure Laterality Date     DILATION AND CURETTAGE, OPERATIVE HYSTEROSCOPY, COMBINED N/A 2/16/2016    Procedure: COMBINED DILATION AND CURETTAGE, OPERATIVE HYSTEROSCOPY;  Surgeon: Jenae Walden MD;  Location: [facility] OR     HC FLEX SIGMOIDOSCOPY W/WO DELILAH SPEC BY BRUSH/WASH  1997     HC REVISE MEDIAN N/CARPAL TUNNEL SURG  1985?       Family History:    Family History   Problem Relation Age of Onset     Breast Cancer Sister      Cancer - colorectal Mother      Cardiovascular Father         QUADRUPLE BYPASS IN 1992       Social History:  Marital Status:   [2]  Social History     Tobacco Use     Smoking status: Never Smoker     Smokeless tobacco: Never Used   Vaping Use     Vaping Use: Never used   Substance Use Topics     Alcohol use: Yes     Alcohol/week: 0.0 standard drinks     Comment: once in a while     Drug use: No        Medications:    acetaminophen (TYLENOL) 500 MG tablet  alcohol swab prep pads  alendronate (FOSAMAX) 70 MG tablet  amLODIPine (NORVASC) 5 MG tablet  aspirin 81 MG EC tablet  blood glucose (NO BRAND SPECIFIED) test strip  blood glucose calibration (NO BRAND SPECIFIED) solution  blood glucose monitoring (NO BRAND SPECIFIED) meter device kit  calcium-vitamin D (CALTRATE) 600-400 MG-UNIT per tablet  capsaicin (ZOSTRIX) 0.025 % external cream  ibuprofen (ADVIL/MOTRIN) 200 MG tablet  lisinopril (ZESTRIL) 10 MG tablet  metFORMIN (GLUCOPHAGE-XR) 500 MG 24 hr tablet  simvastatin (ZOCOR) 10 MG tablet  thin (NO BRAND SPECIFIED)  lancets          Review of Systems   All other systems reviewed and are negative.      Physical Exam          Physical Exam  Vitals and nursing note reviewed.   Constitutional:       Appearance: She is not ill-appearing.   HENT:      Head: Normocephalic.      Nose: Nose normal.   Eyes:      Conjunctiva/sclera: Conjunctivae normal.   Cardiovascular:      Rate and Rhythm: Normal rate.   Pulmonary:      Effort: Pulmonary effort is normal.   Musculoskeletal:      Comments: Right upper extremity: No clavicular tenderness.  Pain at the AC joint with no palpable step-off.  Pain primarily in the proximal to mid third of the humerus.  No obvious deformity.  That area has extensive ecchymosis.  There is no palpable hematoma.  Distal pulses for radial and ulnar are strong.  No loss of  strength.   Skin:     General: Skin is warm.      Capillary Refill: Capillary refill takes less than 2 seconds.      Findings: No rash.   Neurological:      General: No focal deficit present.      Mental Status: She is alert and oriented to person, place, and time.   Psychiatric:         Mood and Affect: Mood normal.         Behavior: Behavior normal.         ED Course                 Procedures                  Results for orders placed or performed during the hospital encounter of 02/27/22 (from the past 24 hour(s))   XR Shoulder Right G/E 3 Views    Narrative    EXAM: XR SHOULDER RIGHT G/E 3 VIEWS  LOCATION: [facility]  DATE/TIME: 02/27/2022, 12:22 PM    INDICATION: Fall Thursday. Landed on right shoulder and arm, shoulder pain.  COMPARISON: None.      Impression    IMPRESSION: Acute comminuted mildly displaced fracture of the right humeral head and surgical neck. Moderate degenerative changes at the glenohumeral joint. Mild AC joint arthrosis. The humeral head remains normally located.      XR Humerus Right G/E 2 Views    Narrative    EXAM: XR HUMERUS RIGHT G/E 2 VIEWS  LOCATION: [facility]  DATE/TIME: 02/27/2022, 12:22 PM    INDICATION: Shoulder pain. Recent fall.  COMPARISON: None.      Impression    IMPRESSION: Acute comminuted mildly displaced fracture of the right humeral head and surgical neck. Humeral head remains normally located. Moderate degenerative changes at the glenohumeral joint.     XR Pelvis w Hip Right G/E 2 Views    Narrative    EXAM: XR PELVIS AND HIP RIGHT 2 VIEWS  LOCATION: [facility]  DATE/TIME: 2/27/2022 12:22 PM    INDICATION: Hip pain. Recent fall.  COMPARISON: 11/17/2020.      Impression    IMPRESSION: No acute fracture or dislocation. Chronic ununited moderately displaced fracture of the right greater trochanter. Moderate degenerative changes in the left hip.       Medications - No data to display    Assessments & Plan (with Medical Decision Making)  Mera is 71 years of age.  Walking in the kitchen on Thursday.  Caught her toe on either tear causing her to trip and fall forward.  Landed on her right shoulder and right side on a hardwood surface.  Has been using ice over the shoulder and taking Tylenol for discomfort.  Also complains of mild right hip pain.  Examination/trauma assessment noted that she had isolated pain ecchymosis from the deltoid area of the shoulder down through the mid humerus.  No clavicular pain.  Elbow normal.   X-ray did confirm a fracture of the humeral head comminuted the with no fragment displacement.  Old fracture of the right greater trochanter also noted.  Patient placed in an arm sling.  Referred to orthopedic department.  Tylenol and ice for swelling and discomfort.     I have reviewed the nursing notes.    I have reviewed the findings, diagnosis, plan and need for follow up with the patient.      New Prescriptions    No medications on file       Final diagnoses:   Humeral head fracture, right, closed, initial encounter       2/27/2022   [facility] EMERGENCY DEPT      Frank Reyna, DO  02/27/22 1307

## 2022-02-27 NOTE — ED TRIAGE NOTES
Pt comes in with complaints of R arm pain. Pt fell on Thursday around 1730. Pt states it has been sore since. Pt states she is unable to lift her arm entirely. Pt denies hitting her hear. R upper arm has ecchymosis.

## 2022-02-27 NOTE — DISCHARGE INSTRUCTIONS
X-ray confirms of a fracture of the right shoulder.  Specifically its the head of the humerus.  Fracture involves multiple pieces but there well approximated and not showing any significant displacement.  There is a good opportunity for this to heal without surgery.  You must keep the arm immobilized using an arm sling until you see the orthopedic specialist.  Your referrals been placed electronically so the specially clinic staff should be calling you for that appointment.  Apply ice for 20-30 minutes 4 times a day for swelling, discomfort.  May use extra strength Tylenol.  The x-ray of your right hip shows no acute injury.  It does show that you have an old fracture that you are aware of when you were seen for trauma evaluation at [facility] in the past.  This is healed and was not injured as part of your fall in recent days.

## 2022-03-05 ENCOUNTER — NURSE TRIAGE (OUTPATIENT)
Dept: NURSING | Facility: CLINIC | Age: 72
End: 2022-03-05
Payer: COMMERCIAL

## 2022-03-05 NOTE — TELEPHONE ENCOUNTER
Renuka is calling and states that Mera has a fracture on her upper right arm and now bruising has extended down her right arm and into the side and around breast and nipple looks also bruised and dark.  Denies pain.  Bruise is spreading.  Denies cough fever and shortness of breath.   states that he will bring Mera back to hospital.  Patient denies second level triage.    COVID 19 Nurse Triage Plan/Patient Instructions    Please be aware that novel coronavirus (COVID-19) may be circulating in the community. If you develop symptoms such as fever, cough, or SOB or if you have concerns about the presence of another infection including coronavirus (COVID-19), please contact your health care provider or visit [email or web address].     Disposition/Instructions    In-Person Visit with provider recommended. Reference Visit Selection Guide.    Thank you for taking steps to prevent the spread of this virus.  o Limit your contact with others.  o Wear a simple mask to cover your cough.  o Wash your hands well and often.    Resources    [facility]: About COVID-19: [email or web address]    CDC: What to Do If You're Sick: www.cdc.gov/coronavirus/2019-ncov/about/steps-when-sick.html    CDC: Ending Home Isolation: www.cdc.gov/coronavirus/2019-ncov/hcp/disposition-in-home-patients.html     CDC: Caring for Someone: www.cdc.gov/coronavirus/2019-ncov/if-you-are-sick/care-for-someone.html     [facility]: Interim Guidance for Hospital Discharge to Home: www.health.[facility].[place]/diseases/coronavirus/hcp/hospdischarge.pdf    [facility] clinical trials (COVID-19 research studies): clinicalaffairs.[facility]-clinical-trials     Below are the COVID-19 hotlines at the [place] Department of Health ([facility]). Interpreters are available.   o For health questions: Call 984-137-3954 or 1-314.850.3928 (7 a.m. to 7 p.m.)  o For questions about schools and childcare: Call 062-157-7481 or 1-599.168.2861 (2  a.m. to 7 p.m.)                       Additional Information    Negative: Shock suspected (e.g., cold/pale/clammy skin, too weak to stand, low BP, rapid pulse)    Negative: Sounds like a life-threatening emergency to the triager    Negative: Dizziness or lightheadedness    Negative: [1] Bruise on head/face, chest, or abdomen AND [2]  taking Coumadin (warfarin) or other strong blood thinner, or known bleeding disorder (e.g., thrombocytopenia)    Negative: Unexplained bleeding from another site (e.g., gums, nose, urine) as well    Negative: Patient sounds very sick or weak to the triager    Negative: [1] Not caused by an injury AND [2] 5 or more bruises now    [1] Raised bruise AND [2] size > 2 inches (5 cm) AND [3] getting bigger    Protocols used: BRUISES-A-AH

## 2022-03-20 DIAGNOSIS — E11.65 TYPE 2 DIABETES MELLITUS WITH HYPERGLYCEMIA, WITHOUT LONG-TERM CURRENT USE OF INSULIN (H): ICD-10-CM

## 2022-03-21 NOTE — TELEPHONE ENCOUNTER
Pending Prescriptions:                       Disp   Refills    metFORMIN (GLUCOPHAGE-XR) 500 MG 24 hr tab*270 ta*0        Sig: TAKE THREE TABLETS BY MOUTH EVERY DAY WITH DINNER    Routing refill request to provider for review/approval because:  Labs not current:  A1C    Mayda Molina RN

## 2022-03-22 ENCOUNTER — ANCILLARY PROCEDURE (OUTPATIENT)
Dept: GENERAL RADIOLOGY | Facility: CLINIC | Age: 72
End: 2022-03-22
Attending: ORTHOPAEDIC SURGERY
Payer: COMMERCIAL

## 2022-03-22 ENCOUNTER — OFFICE VISIT (OUTPATIENT)
Dept: ORTHOPEDICS | Facility: CLINIC | Age: 72
End: 2022-03-22
Attending: EMERGENCY MEDICINE
Payer: COMMERCIAL

## 2022-03-22 VITALS
HEART RATE: 88 BPM | WEIGHT: 179 LBS | HEIGHT: 65 IN | DIASTOLIC BLOOD PRESSURE: 84 MMHG | RESPIRATION RATE: 22 BRPM | BODY MASS INDEX: 29.82 KG/M2 | SYSTOLIC BLOOD PRESSURE: 159 MMHG

## 2022-03-22 DIAGNOSIS — S42.291A CLOSED FRACTURE OF HEAD OF RIGHT HUMERUS, INITIAL ENCOUNTER: Primary | ICD-10-CM

## 2022-03-22 DIAGNOSIS — S42.221A CLOSED 2-PART DISPLACED FRACTURE OF SURGICAL NECK OF RIGHT HUMERUS, INITIAL ENCOUNTER: ICD-10-CM

## 2022-03-22 DIAGNOSIS — S42.291A CLOSED FRACTURE OF HEAD OF RIGHT HUMERUS, INITIAL ENCOUNTER: ICD-10-CM

## 2022-03-22 DIAGNOSIS — S42.291A HUMERAL HEAD FRACTURE, RIGHT, CLOSED, INITIAL ENCOUNTER: ICD-10-CM

## 2022-03-22 PROCEDURE — 23600 CLTX PROX HUMRL FX W/O MNPJ: CPT | Mod: RT | Performed by: ORTHOPAEDIC SURGERY

## 2022-03-22 PROCEDURE — 99213 OFFICE O/P EST LOW 20 MIN: CPT | Mod: 57 | Performed by: ORTHOPAEDIC SURGERY

## 2022-03-22 PROCEDURE — 73030 X-RAY EXAM OF SHOULDER: CPT | Mod: TC | Performed by: RADIOLOGY

## 2022-03-22 RX ORDER — METFORMIN HCL 500 MG
TABLET, EXTENDED RELEASE 24 HR ORAL
Qty: 270 TABLET | Refills: 0 | Status: SHIPPED | OUTPATIENT
Start: 2022-03-22 | End: 2022-06-15

## 2022-03-22 NOTE — LETTER
3/22/2022         RE: Mera Valdivia  [address] [facility] [address]        Dear Colleague,    Thank you for referring your patient, Mera Valdivia, to the [facility]. Please see a copy of my visit note below.    Mera Valdivia is a 71 year old female seen in emergency room follow-up for right proximal humerus fracture.    She sustained this on 2/24/22 when she  fell over a chair at home.    The arm was placed into a sling.  She is now seen for definitive treatment.    Other injuries: None.    Past Medical History:   Diagnosis Date     Basal cell carcinoma      Endometrial cells on cervical Pap smear inconsistent w/LMP 11/11/15    pap NIL/neg        Past Surgical History:   Procedure Laterality Date     DILATION AND CURETTAGE, OPERATIVE HYSTEROSCOPY, COMBINED N/A 2/16/2016    Procedure: COMBINED DILATION AND CURETTAGE, OPERATIVE HYSTEROSCOPY;  Surgeon: Jenae Walden MD;  Location:  OR     HC FLEX SIGMOIDOSCOPY W/WO DELILAH SPEC BY BRUSH/WASH  1997     HC REVISE MEDIAN N/CARPAL TUNNEL SURG  1985?       Family History   Problem Relation Age of Onset     Breast Cancer Sister      Cancer - colorectal Mother      Cardiovascular Father         QUADRUPLE BYPASS IN 1992       Social History     Socioeconomic History     Marital status:      Spouse name: Not on file     Number of children: Not on file     Years of education: Not on file     Highest education level: Not on file   Occupational History     Not on file   Tobacco Use     Smoking status: Never Smoker     Smokeless tobacco: Never Used   Vaping Use     Vaping Use: Never used   Substance and Sexual Activity     Alcohol use: Yes     Alcohol/week: 0.0 standard drinks     Comment: once in a while     Drug use: No     Sexual activity: Not Currently     Partners: Male   Other Topics Concern      Service No     Blood Transfusions No     Caffeine Concern No     Comment: diet pop, decaff cofee      Occupational Exposure No     Hobby Hazards No     Sleep Concern No     Stress Concern No     Weight Concern No     Special Diet No     Back Care No     Exercise Yes     Bike Helmet Not Asked     Seat Belt Yes     Self-Exams No     Parent/sibling w/ CABG, MI or angioplasty before 65F 55M? No   Social History Narrative     Not on file     Social Determinants of Health     Financial Resource Strain: Not on file   Food Insecurity: Not on file   Transportation Needs: Not on file   Physical Activity: Not on file   Stress: Not on file   Social Connections: Not on file   Intimate Partner Violence: Not on file   Housing Stability: Not on file       Current Outpatient Medications   Medication Sig Dispense Refill     acetaminophen (TYLENOL) 500 MG tablet Take 1,000 mg by mouth every 4 hours as needed for mild pain       alcohol swab prep pads Use to swab area of injection/briseida as directed. 100 each 3     alendronate (FOSAMAX) 70 MG tablet Take 1 tablet (70 mg) by mouth every 7 days Take with over 8 ounces water and stay upright for at least 30 minutes after dose.  Take at least 60 minutes before breakfast 4 tablet 0     amLODIPine (NORVASC) 5 MG tablet TAKE ONE TABLET BY MOUTH ONCE DAILY 90 tablet 1     aspirin 81 MG EC tablet Take 1 tablet (81 mg) by mouth daily 90 tablet 3     blood glucose (NO BRAND SPECIFIED) test strip Use to test blood sugar 2 times daily or as directed. To accompany: Blood Glucose Monitor Brands: per insurance. 100 strip 6     blood glucose calibration (NO BRAND SPECIFIED) solution To accompany: Blood Glucose Monitor Brands: per insurance. 1 each 0     blood glucose monitoring (NO BRAND SPECIFIED) meter device kit Use to test blood sugar 2 times daily or as directed. Preferred blood glucose meter OR supplies to accompany: Blood Glucose Monitor Brands: per insurance. 1 kit 0     calcium-vitamin D (CALTRATE) 600-400 MG-UNIT per tablet Take 1 tablet by mouth 2 times daily (Patient not taking: Reported on  8/12/2021)       capsaicin (ZOSTRIX) 0.025 % external cream Apply topically 3 times daily (Patient not taking: Reported on 8/12/2021)       ibuprofen (ADVIL/MOTRIN) 200 MG tablet Take 400 mg by mouth every 4 hours as needed for mild pain       lisinopril (ZESTRIL) 10 MG tablet TAKE ONE TABLET BY MOUTH ONCE DAILY 90 tablet 0     metFORMIN (GLUCOPHAGE-XR) 500 MG 24 hr tablet TAKE THREE TABLETS BY MOUTH EVERY DAY WITH DINNER 270 tablet 0     simvastatin (ZOCOR) 10 MG tablet TAKE ONE TABLET BY MOUTH AT BEDTIME 90 tablet 2     thin (NO BRAND SPECIFIED) lancets Use with lanceting device. To accompany: Blood Glucose Monitor Brands: per insurance. 100 each 6       Allergies   Allergen Reactions     Nitrous Oxide      Doesn't recall any reaction to this drug       REVIEW OF SYSTEMS:  CONSTITUTIONAL:  NEGATIVE for fever, chills, change in weight, not feeling tired  SKIN:  NEGATIVE for worrisome rashes, no skin lumps, no skin ulcers and no non-healing wounds  EYES:  NEGATIVE for vision changes or irritation.  ENT/MOUTH:  NEGATIVE.  No hearing loss, no hoarseness, no difficulty swallowing.  RESP:  NEGATIVE. No cough or shortness of breath.  BREAST:  NEGATIVE for masses, tenderness or discharge  CV:  NEGATIVE for chest pain, palpitations or peripheral edema  GI:  NEGATIVE for nausea, abdominal pain, heartburn, or change in bowel habits  :  Negative. No dysuria, no hematuria  MUSCULOSKELETAL:  See HPI above  NEURO:  NEGATIVE . No headaches, no dizziness,  no numbness  ENDOCRINE:  NEGATIVE for temperature intolerance, skin/hair changes  HEME/ALLERGY/IMMUNE:  NEGATIVE for bleeding problems  PSYCHIATRIC:  NEGATIVE. no anxiety, no depression.          Xray shows proximal humerus fracture with mild impaction of humerus neck and also tuberosity fracture.  New x-ray shows progressive impaction and angulation.  It is still acceptable, but worse position.     Exam:    Shows moderate tenderness at right proximal humerus.  Resolving  bruising.  mild swelling of shoulder.  full range of motion of fingers, wrist, elbow.  Sensation, motor, and circulation are intact    Assessment/Plan:  Right proximal humerus fracture with moderate displacement.  This is acceptable position, but she needs to avoid making it worse.    Continue sling, but don't pull it up tight.  Avoid use of arm.  She tends to talk with her arms.  Return to clinic 3 weeks with xray right shoulder.            Again, thank you for allowing me to participate in the care of your patient.        Sincerely,        Zeus Cobos MD

## 2022-03-23 PROBLEM — S42.221A CLOSED 2-PART DISPLACED FRACTURE OF SURGICAL NECK OF RIGHT HUMERUS: Status: ACTIVE | Noted: 2022-03-23

## 2022-03-23 NOTE — PROGRESS NOTES
Mera Valdivia is a 71 year old female seen in emergency room follow-up for right proximal humerus fracture.    She sustained this on 2/24/22 when she  fell over a chair at home.    The arm was placed into a sling.  She is now seen for definitive treatment.    Other injuries: None.    Past Medical History:   Diagnosis Date     Basal cell carcinoma      Endometrial cells on cervical Pap smear inconsistent w/LMP 11/11/15    pap NIL/neg        Past Surgical History:   Procedure Laterality Date     DILATION AND CURETTAGE, OPERATIVE HYSTEROSCOPY, COMBINED N/A 2/16/2016    Procedure: COMBINED DILATION AND CURETTAGE, OPERATIVE HYSTEROSCOPY;  Surgeon: Jenae Walden MD;  Location:  OR     HC FLEX SIGMOIDOSCOPY W/WO DELILAH SPEC BY BRUSH/WASH  1997     HC REVISE MEDIAN N/CARPAL TUNNEL SURG  1985?       Family History   Problem Relation Age of Onset     Breast Cancer Sister      Cancer - colorectal Mother      Cardiovascular Father         QUADRUPLE BYPASS IN 1992       Social History     Socioeconomic History     Marital status:      Spouse name: Not on file     Number of children: Not on file     Years of education: Not on file     Highest education level: Not on file   Occupational History     Not on file   Tobacco Use     Smoking status: Never Smoker     Smokeless tobacco: Never Used   Vaping Use     Vaping Use: Never used   Substance and Sexual Activity     Alcohol use: Yes     Alcohol/week: 0.0 standard drinks     Comment: once in a while     Drug use: No     Sexual activity: Not Currently     Partners: Male   Other Topics Concern      Service No     Blood Transfusions No     Caffeine Concern No     Comment: diet pop, decaff cofee     Occupational Exposure No     Hobby Hazards No     Sleep Concern No     Stress Concern No     Weight Concern No     Special Diet No     Back Care No     Exercise Yes     Bike Helmet Not Asked     Seat Belt Yes     Self-Exams No     Parent/sibling w/ CABG, MI or  angioplasty before 65F 55M? No   Social History Narrative     Not on file     Social Determinants of Health     Financial Resource Strain: Not on file   Food Insecurity: Not on file   Transportation Needs: Not on file   Physical Activity: Not on file   Stress: Not on file   Social Connections: Not on file   Intimate Partner Violence: Not on file   Housing Stability: Not on file       Current Outpatient Medications   Medication Sig Dispense Refill     acetaminophen (TYLENOL) 500 MG tablet Take 1,000 mg by mouth every 4 hours as needed for mild pain       alcohol swab prep pads Use to swab area of injection/briseida as directed. 100 each 3     alendronate (FOSAMAX) 70 MG tablet Take 1 tablet (70 mg) by mouth every 7 days Take with over 8 ounces water and stay upright for at least 30 minutes after dose.  Take at least 60 minutes before breakfast 4 tablet 0     amLODIPine (NORVASC) 5 MG tablet TAKE ONE TABLET BY MOUTH ONCE DAILY 90 tablet 1     aspirin 81 MG EC tablet Take 1 tablet (81 mg) by mouth daily 90 tablet 3     blood glucose (NO BRAND SPECIFIED) test strip Use to test blood sugar 2 times daily or as directed. To accompany: Blood Glucose Monitor Brands: per insurance. 100 strip 6     blood glucose calibration (NO BRAND SPECIFIED) solution To accompany: Blood Glucose Monitor Brands: per insurance. 1 each 0     blood glucose monitoring (NO BRAND SPECIFIED) meter device kit Use to test blood sugar 2 times daily or as directed. Preferred blood glucose meter OR supplies to accompany: Blood Glucose Monitor Brands: per insurance. 1 kit 0     calcium-vitamin D (CALTRATE) 600-400 MG-UNIT per tablet Take 1 tablet by mouth 2 times daily (Patient not taking: Reported on 8/12/2021)       capsaicin (ZOSTRIX) 0.025 % external cream Apply topically 3 times daily (Patient not taking: Reported on 8/12/2021)       ibuprofen (ADVIL/MOTRIN) 200 MG tablet Take 400 mg by mouth every 4 hours as needed for mild pain       lisinopril  (ZESTRIL) 10 MG tablet TAKE ONE TABLET BY MOUTH ONCE DAILY 90 tablet 0     metFORMIN (GLUCOPHAGE-XR) 500 MG 24 hr tablet TAKE THREE TABLETS BY MOUTH EVERY DAY WITH DINNER 270 tablet 0     simvastatin (ZOCOR) 10 MG tablet TAKE ONE TABLET BY MOUTH AT BEDTIME 90 tablet 2     thin (NO BRAND SPECIFIED) lancets Use with lanceting device. To accompany: Blood Glucose Monitor Brands: per insurance. 100 each 6       Allergies   Allergen Reactions     Nitrous Oxide      Doesn't recall any reaction to this drug       REVIEW OF SYSTEMS:  CONSTITUTIONAL:  NEGATIVE for fever, chills, change in weight, not feeling tired  SKIN:  NEGATIVE for worrisome rashes, no skin lumps, no skin ulcers and no non-healing wounds  EYES:  NEGATIVE for vision changes or irritation.  ENT/MOUTH:  NEGATIVE.  No hearing loss, no hoarseness, no difficulty swallowing.  RESP:  NEGATIVE. No cough or shortness of breath.  BREAST:  NEGATIVE for masses, tenderness or discharge  CV:  NEGATIVE for chest pain, palpitations or peripheral edema  GI:  NEGATIVE for nausea, abdominal pain, heartburn, or change in bowel habits  :  Negative. No dysuria, no hematuria  MUSCULOSKELETAL:  See HPI above  NEURO:  NEGATIVE . No headaches, no dizziness,  no numbness  ENDOCRINE:  NEGATIVE for temperature intolerance, skin/hair changes  HEME/ALLERGY/IMMUNE:  NEGATIVE for bleeding problems  PSYCHIATRIC:  NEGATIVE. no anxiety, no depression.          Xray shows proximal humerus fracture with mild impaction of humerus neck and also tuberosity fracture.  New x-ray shows progressive impaction and angulation.  It is still acceptable, but worse position.     Exam:    Shows moderate tenderness at right proximal humerus.  Resolving bruising.  mild swelling of shoulder.  full range of motion of fingers, wrist, elbow.  Sensation, motor, and circulation are intact    Assessment/Plan:  Right proximal humerus fracture with moderate displacement.  This is acceptable position, but she needs to  avoid making it worse.    Continue sling, but don't pull it up tight.  Avoid use of arm.  She tends to talk with her arms.  Return to clinic 3 weeks with xray right shoulder.

## 2022-04-13 ENCOUNTER — OFFICE VISIT (OUTPATIENT)
Dept: ORTHOPEDICS | Facility: CLINIC | Age: 72
End: 2022-04-13
Payer: COMMERCIAL

## 2022-04-13 VITALS
HEIGHT: 65 IN | WEIGHT: 179 LBS | HEART RATE: 92 BPM | OXYGEN SATURATION: 97 % | BODY MASS INDEX: 29.82 KG/M2 | SYSTOLIC BLOOD PRESSURE: 139 MMHG | DIASTOLIC BLOOD PRESSURE: 79 MMHG

## 2022-04-13 DIAGNOSIS — S42.291A HUMERAL HEAD FRACTURE, RIGHT, CLOSED, INITIAL ENCOUNTER: Primary | ICD-10-CM

## 2022-04-13 DIAGNOSIS — S42.221D CLOSED 2-PART DISPLACED FRACTURE OF SURGICAL NECK OF RIGHT HUMERUS WITH ROUTINE HEALING, SUBSEQUENT ENCOUNTER: ICD-10-CM

## 2022-04-13 PROCEDURE — 99207 PR FRACTURE CARE IN GLOBAL PERIOD: CPT | Performed by: ORTHOPAEDIC SURGERY

## 2022-04-13 ASSESSMENT — PAIN SCALES - GENERAL: PAINLEVEL: NO PAIN (1)

## 2022-04-13 NOTE — PROGRESS NOTES
Follow up right proximal humerus fracture on 2/24/22 when she fell over a chair at home.   Working on range of motion.  She is comfortable.    Xray:  Compared to previous films there has not been significant interval changes;alignment remains acceptable. New periosteal bone formation is seen.  Exam shows slight tenderness at right proximal humerus.  no swelling of hand and forearm.  Passive flexion of 90 degrees, external rotation 45 degrees, internal rotation 50 degrees.  Assessment: right proximal humerus fracture stable.  Plan:  More aggressive range of motion.  No heavy use.  Return to clinic 3-4 weeks for clinical check of range of motion.

## 2022-04-13 NOTE — LETTER
4/13/2022         RE: Mera Valdivia  [address] [facility] [address]        Dear Colleague,    Thank you for referring your patient, Mera Valdivia, to the [facility]. Please see a copy of my visit note below.    Follow up right proximal humerus fracture on 2/24/22 when she fell over a chair at home.   Working on range of motion.  She is comfortable.    Xray:  Compared to previous films there has not been significant interval changes;alignment remains acceptable. New periosteal bone formation is seen.  Exam shows slight tenderness at right proximal humerus.  no swelling of hand and forearm.  Passive flexion of 90 degrees, external rotation 45 degrees, internal rotation 50 degrees.  Assessment: right proximal humerus fracture stable.  Plan:  More aggressive range of motion.  No heavy use.  Return to clinic 3-4 weeks for clinical check of range of motion.          Again, thank you for allowing me to participate in the care of your patient.        Sincerely,        Zeus Cobos MD

## 2022-04-13 NOTE — PATIENT INSTRUCTIONS
Rotator Cuff Strain Rehabilitation Exercises     Return to clinic 3-4 weeks to check range of motion.             You may do all of these exercises right away.   Isometric shoulder external rotation:  a doorway with your elbow bent 90 degrees and the back of the wrist on your injured side pressed against the door frame. Try to press your hand outward into the door frame. Hold for 5 seconds. Do 3 sets of 10.   Isometric shoulder internal rotation:  a doorway with your elbow bent 90 degrees and the front of the wrist on your injured side pressed against the door frame. Try to press your palm into the door frame. Hold for 5 seconds. Do 3 sets of 10.   Wand exercise: Flexion: Stand upright and hold a stick in both hands, palms down. Stretch your arms by lifting them over your head, keeping your arms straight. Hold for 5 seconds and return to the starting position. Repeat 10 times.   Wand exercise: Extension: Stand upright and hold a stick in both hands behind your back. Move the stick away from your back. Hold the end position for 5 seconds. Relax and return to the starting position. Repeat 10 times.   Wand exercise: External rotation: Lie on your back and hold a stick in both hands, palms up. Your upper arms should be resting on the floor with your elbows at your sides and bent 90 degrees. Use your uninjured arm to push your injured arm out away from your body. Keep the elbow of your injured arm at your side while it is being pushed. Hold the stretch for 5 seconds. Repeat 10 times.   Wand exercise: Shoulder abduction and adduction: Stand and hold a stick with both hands, palms facing away from your body. Rest the stick against the front of your thighs. Use your uninjured arm to push your injured arm out to the side and up as high as possible. Keep your arms straight. Hold for 5 seconds. Repeat 10 times.     Published by [employer].  This content is reviewed periodically and is subject to change as  new health information becomes available. The information is intended to inform and educate and is not a replacement for medical evaluation, advice, diagnosis or treatment by a healthcare professional.   Written by Aleyda Orellana MS, PT, and Rohini Zaragoza PT, [facility], [facility], for [employer].   ? 2010 [facility] and/or its affiliates. All Rights Reserved.   Copyright   [employer] 2011  Adult Health Advisor

## 2022-05-04 ENCOUNTER — OFFICE VISIT (OUTPATIENT)
Dept: ORTHOPEDICS | Facility: CLINIC | Age: 72
End: 2022-05-04
Payer: COMMERCIAL

## 2022-05-04 VITALS — WEIGHT: 179 LBS | RESPIRATION RATE: 20 BRPM | BODY MASS INDEX: 29.82 KG/M2 | HEIGHT: 65 IN

## 2022-05-04 DIAGNOSIS — S42.221D CLOSED 2-PART DISPLACED FRACTURE OF SURGICAL NECK OF RIGHT HUMERUS WITH ROUTINE HEALING, SUBSEQUENT ENCOUNTER: Primary | ICD-10-CM

## 2022-05-04 PROCEDURE — 99207 PR FRACTURE CARE IN GLOBAL PERIOD: CPT | Performed by: ORTHOPAEDIC SURGERY

## 2022-05-04 NOTE — PROGRESS NOTES
Follow up right proximal humerus fracture on 2/24/22 when she fell over a chair at home.   Working on range of motion.  She is comfortable.      Exam shows no tenderness at right proximal humerus.  no swelling of hand and forearm.  Passive flexion of 135 degrees, external rotation 60 degrees, internal rotation 50 degrees.  Assessment: right proximal humerus fracture stable.  Plan:  Continue aggressive range of motion.  Resume activity as tolerated.  Return to clinic as needed.

## 2022-05-04 NOTE — LETTER
5/4/2022         RE: Mera Valdivia  [address] [facility] [address]        Dear Colleague,    Thank you for referring your patient, Mera Valdivia, to the [facility]. Please see a copy of my visit note below.    Follow up right proximal humerus fracture on 2/24/22 when she fell over a chair at home.   Working on range of motion.  She is comfortable.      Exam shows no tenderness at right proximal humerus.  no swelling of hand and forearm.  Passive flexion of 135 degrees, external rotation 60 degrees, internal rotation 50 degrees.  Assessment: right proximal humerus fracture stable.  Plan:  Continue aggressive range of motion.  Resume activity as tolerated.  Return to clinic as needed.          Again, thank you for allowing me to participate in the care of your patient.        Sincerely,        Zeus Cobos MD

## 2022-05-07 DIAGNOSIS — E11.65 TYPE 2 DIABETES MELLITUS WITH HYPERGLYCEMIA, WITHOUT LONG-TERM CURRENT USE OF INSULIN (H): ICD-10-CM

## 2022-05-10 RX ORDER — LISINOPRIL 10 MG/1
TABLET ORAL
Qty: 90 TABLET | Refills: 0 | Status: SHIPPED | OUTPATIENT
Start: 2022-05-10 | End: 2022-06-15

## 2022-05-10 NOTE — TELEPHONE ENCOUNTER
Pending Prescriptions:                       Disp   Refills    lisinopril (ZESTRIL) 10 MG tablet [Pharmac*90 tab*0        Sig: TAKE ONE TABLET BY MOUTH ONCE DAILY    Routing refill request to provider for review/approval because:  Labs out of range:  BHARATH Molina RN

## 2022-06-10 DIAGNOSIS — E78.5 HYPERLIPIDEMIA LDL GOAL <100: ICD-10-CM

## 2022-06-10 DIAGNOSIS — E11.65 TYPE 2 DIABETES MELLITUS WITH HYPERGLYCEMIA, WITHOUT LONG-TERM CURRENT USE OF INSULIN (H): ICD-10-CM

## 2022-06-11 DIAGNOSIS — E11.65 TYPE 2 DIABETES MELLITUS WITH HYPERGLYCEMIA, WITHOUT LONG-TERM CURRENT USE OF INSULIN (H): ICD-10-CM

## 2022-06-11 DIAGNOSIS — E78.5 HYPERLIPIDEMIA LDL GOAL <100: ICD-10-CM

## 2022-06-14 RX ORDER — SIMVASTATIN 10 MG
TABLET ORAL
Qty: 90 TABLET | Refills: 0 | Status: SHIPPED | OUTPATIENT
Start: 2022-06-14 | End: 2022-09-07

## 2022-06-14 NOTE — TELEPHONE ENCOUNTER
Pending Prescriptions:                       Disp   Refills    simvastatin (ZOCOR) 10 MG tablet [Pharmacy*90 tab*0        Sig: TAKE ONE TABLET BY MOUTH AT BEDTIME      Routing refill request to provider for review/approval because:  Alissa given x1 and patient did not follow up, please advise    Ashley Choudhary RN

## 2022-06-15 RX ORDER — LISINOPRIL 10 MG/1
TABLET ORAL
Qty: 90 TABLET | Refills: 0 | Status: SHIPPED | OUTPATIENT
Start: 2022-06-15 | End: 2022-09-07

## 2022-06-15 RX ORDER — AMLODIPINE BESYLATE 5 MG/1
TABLET ORAL
Qty: 90 TABLET | Refills: 1 | Status: SHIPPED | OUTPATIENT
Start: 2022-06-15 | End: 2023-02-03

## 2022-06-15 RX ORDER — METFORMIN HCL 500 MG
TABLET, EXTENDED RELEASE 24 HR ORAL
Qty: 270 TABLET | Refills: 0 | Status: SHIPPED | OUTPATIENT
Start: 2022-06-15 | End: 2022-09-07

## 2022-06-15 NOTE — TELEPHONE ENCOUNTER
Metformin  Routing refill request to provider for review/approval because:  Labs not current:  A1C  Patient needs to be seen because:  6 month visit due      Lisinopril  Routing refill request to provider for review/approval because:  Labs not current:  CRE      Amlodipine  Routing refill request to provider for review/approval because:  Labs not current:  CRE      Giacomo Vang RN

## 2022-09-07 DIAGNOSIS — E78.5 HYPERLIPIDEMIA LDL GOAL <100: ICD-10-CM

## 2022-09-07 DIAGNOSIS — E11.65 TYPE 2 DIABETES MELLITUS WITH HYPERGLYCEMIA, WITHOUT LONG-TERM CURRENT USE OF INSULIN (H): ICD-10-CM

## 2022-09-09 RX ORDER — METFORMIN HCL 500 MG
TABLET, EXTENDED RELEASE 24 HR ORAL
Qty: 270 TABLET | Refills: 0 | Status: SHIPPED | OUTPATIENT
Start: 2022-09-09 | End: 2022-12-20

## 2022-09-09 RX ORDER — LISINOPRIL 10 MG/1
10 TABLET ORAL DAILY
Qty: 90 TABLET | Refills: 0 | Status: SHIPPED | OUTPATIENT
Start: 2022-09-09 | End: 2023-02-03

## 2022-09-09 RX ORDER — SIMVASTATIN 10 MG
TABLET ORAL
Qty: 90 TABLET | Refills: 0 | Status: SHIPPED | OUTPATIENT
Start: 2022-09-09 | End: 2022-12-12

## 2022-09-09 NOTE — TELEPHONE ENCOUNTER
Simvastatin  Routing refill request to provider for review/approval because:  Labs not current:  LDL  Patient needs to be seen because it has been more than 1 year since last office visit.    Metformin  Routing refill request to provider for review/approval because:  Labs not current:  A1c. CR/EGFR  Patient needs to be seen because:  6 months    Lisinopril  Routing refill request to provider for review/approval because:  Labs not current:  CREAT, K+  Patient needs to be seen because it has been more than 1 year since last office visit.    Will Valdes RN

## 2022-12-12 DIAGNOSIS — E11.65 TYPE 2 DIABETES MELLITUS WITH HYPERGLYCEMIA, WITHOUT LONG-TERM CURRENT USE OF INSULIN (H): ICD-10-CM

## 2022-12-12 DIAGNOSIS — E78.5 HYPERLIPIDEMIA LDL GOAL <100: ICD-10-CM

## 2022-12-12 RX ORDER — SIMVASTATIN 10 MG
TABLET ORAL
Qty: 90 TABLET | Refills: 0 | Status: SHIPPED | OUTPATIENT
Start: 2022-12-12 | End: 2023-02-24

## 2022-12-12 NOTE — TELEPHONE ENCOUNTER
"Requested Prescriptions   Pending Prescriptions Disp Refills    simvastatin (ZOCOR) 10 MG tablet 90 tablet 0     Sig: TAKE ONE TABLET BY MOUTH AT BEDTIME       Statins Protocol Failed - 12/12/2022  8:43 AM        Failed - LDL on file in past 12 months     Recent Labs   Lab Test 07/20/21  0847   LDL 65             Failed - Recent (12 mo) or future (30 days) visit within the authorizing provider's specialty     Patient has had an office visit with the authorizing provider or a provider within the authorizing providers department within the previous 12 mos or has a future within next 30 days. See \"Patient Info\" tab in inbasket, or \"Choose Columns\" in Meds & Orders section of the refill encounter.              Passed - No abnormal creatine kinase in past 12 months     No lab results found.             Passed - Medication is active on med list        Passed - Patient is age 18 or older        Passed - No active pregnancy on record        Passed - No positive pregnancy test in past 12 months             DENNIS AlvaradoN, RN     "

## 2022-12-19 DIAGNOSIS — E11.65 TYPE 2 DIABETES MELLITUS WITH HYPERGLYCEMIA, WITHOUT LONG-TERM CURRENT USE OF INSULIN (H): ICD-10-CM

## 2022-12-20 RX ORDER — METFORMIN HCL 500 MG
TABLET, EXTENDED RELEASE 24 HR ORAL
Qty: 270 TABLET | Refills: 0 | Status: SHIPPED | OUTPATIENT
Start: 2022-12-20 | End: 2023-02-24

## 2022-12-20 NOTE — TELEPHONE ENCOUNTER
Pending Prescriptions:                       Disp   Refills    metFORMIN (GLUCOPHAGE XR) 500 MG 24 hr tab*270 ta*0        Sig: TAKE THREE TABLETS BY MOUTH EVERY DAY WITH DINNER      Routing refill request to provider for review/approval because:  Alissa given x1 and patient did not follow up, please advise  Labs not current:    Lab Results   Component Value Date    A1C 6.7 07/20/2021    A1C 6.8 09/22/2020    A1C 7.7 11/01/2019    A1C 7.7 02/12/2019    A1C 7.9 02/13/2018    A1C 7.1 04/19/2017     Ashley Choudhary RN    Patient needs to be seen because it has been more than 1 year since last office visit.

## 2023-02-02 DIAGNOSIS — E11.65 TYPE 2 DIABETES MELLITUS WITH HYPERGLYCEMIA, WITHOUT LONG-TERM CURRENT USE OF INSULIN (H): ICD-10-CM

## 2023-02-02 DIAGNOSIS — E78.5 HYPERLIPIDEMIA LDL GOAL <100: ICD-10-CM

## 2023-02-02 NOTE — TELEPHONE ENCOUNTER
Pending Prescriptions:                       Disp   Refills    lisinopril (ZESTRIL) 10 MG tablet          90 tab*0        Sig: Take 1 tablet (10 mg) by mouth daily    amLODIPine (NORVASC) 5 MG tablet           90 tab*1        Sig: Take 1 tablet (5 mg) by mouth daily      Routing refill request to provider for review/approval because:  Patient needs to be seen because it has been more than 1 year since last office visit.    Katina Henderson RN on 2/2/2023 at 5:34 PM

## 2023-02-03 RX ORDER — AMLODIPINE BESYLATE 5 MG/1
5 TABLET ORAL DAILY
Qty: 90 TABLET | Refills: 1 | Status: SHIPPED | OUTPATIENT
Start: 2023-02-03 | End: 2023-07-17

## 2023-02-03 RX ORDER — LISINOPRIL 10 MG/1
10 TABLET ORAL DAILY
Qty: 90 TABLET | Refills: 0 | Status: SHIPPED | OUTPATIENT
Start: 2023-02-03 | End: 2023-05-03

## 2023-02-24 DIAGNOSIS — E11.65 TYPE 2 DIABETES MELLITUS WITH HYPERGLYCEMIA, WITHOUT LONG-TERM CURRENT USE OF INSULIN (H): ICD-10-CM

## 2023-02-24 DIAGNOSIS — E78.5 HYPERLIPIDEMIA LDL GOAL <100: ICD-10-CM

## 2023-02-24 RX ORDER — SIMVASTATIN 10 MG
TABLET ORAL
Qty: 90 TABLET | Refills: 0 | Status: SHIPPED | OUTPATIENT
Start: 2023-02-24 | End: 2023-06-21

## 2023-02-24 RX ORDER — METFORMIN HCL 500 MG
TABLET, EXTENDED RELEASE 24 HR ORAL
Qty: 270 TABLET | Refills: 0 | Status: SHIPPED | OUTPATIENT
Start: 2023-02-24 | End: 2023-05-09

## 2023-02-24 NOTE — TELEPHONE ENCOUNTER
Pending Prescriptions:                       Disp   Refills    simvastatin (ZOCOR) 10 MG tablet           90 tab*0        Sig: TAKE ONE TABLET BY MOUTH AT BEDTIME    metFORMIN (GLUCOPHAGE XR) 500 MG 24 hr tab*270 ta*0        Sig: TAKE THREE TABLETS BY MOUTH EVERY DAY WITH DINNER      Routing refill request to provider for review/approval because:  Patient needs to be seen because it has been more than 1 year since last office visit.    Katina Henderson RN on 2/24/2023 at 1:30 PM

## 2023-05-01 DIAGNOSIS — E11.65 TYPE 2 DIABETES MELLITUS WITH HYPERGLYCEMIA, WITHOUT LONG-TERM CURRENT USE OF INSULIN (H): ICD-10-CM

## 2023-05-03 RX ORDER — LISINOPRIL 10 MG/1
10 TABLET ORAL DAILY
Qty: 90 TABLET | Refills: 0 | Status: SHIPPED | OUTPATIENT
Start: 2023-05-03 | End: 2023-07-20

## 2023-05-03 NOTE — TELEPHONE ENCOUNTER
"Lisinopril  Routing refill request to provider for review/approval because:  Last office visit 8/17/2021.  Labs, BP due    Future Appointments 5/3/2023 - 10/30/2023    None        Sending to scheduling for yearly office visit due    Requested Prescriptions   Pending Prescriptions Disp Refills     lisinopril (ZESTRIL) 10 MG tablet 90 tablet 0     Sig: Take 1 tablet (10 mg) by mouth daily       ACE Inhibitors (Including Combos) Protocol Failed - 5/1/2023  3:46 PM        Failed - Blood pressure under 140/90 in past 12 months     BP Readings from Last 3 Encounters:   04/13/22 139/79   03/22/22 (!) 159/84   02/27/22 (!) 162/83             Failed - Recent (12 mo) or future (30 days) visit within the authorizing provider's specialty     Patient has had an office visit with the authorizing provider or a provider within the authorizing providers department within the previous 12 mos or has a future within next 30 days. See \"Patient Info\" tab in inbasket, or \"Choose Columns\" in Meds & Orders section of the refill encounter.              Failed - Normal serum creatinine on file in past 12 months     Recent Labs   Lab Test 08/13/21 0317   CR 1.29*           Failed - Normal serum potassium on file in past 12 months     Recent Labs   Lab Test 08/13/21 0317   POTASSIUM 4.1          Mayda Molina RN    "

## 2023-05-09 DIAGNOSIS — E11.65 TYPE 2 DIABETES MELLITUS WITH HYPERGLYCEMIA, WITHOUT LONG-TERM CURRENT USE OF INSULIN (H): ICD-10-CM

## 2023-05-09 RX ORDER — METFORMIN HCL 500 MG
TABLET, EXTENDED RELEASE 24 HR ORAL
Qty: 270 TABLET | Refills: 0 | Status: SHIPPED | OUTPATIENT
Start: 2023-05-09 | End: 2023-08-29

## 2023-05-09 NOTE — TELEPHONE ENCOUNTER
"Requested Prescriptions   Pending Prescriptions Disp Refills    metFORMIN (GLUCOPHAGE XR) 500 MG 24 hr tablet 270 tablet 0     Sig: TAKE THREE TABLETS BY MOUTH EVERY DAY WITH DINNER       Biguanide Agents Failed - 5/9/2023 10:50 AM        Failed - Patient has documented A1c within the specified period of time.     If HgbA1C is 8 or greater, it needs to be on file within the past 3 months.  If less than 8, must be on file within the past 6 months.     Recent Labs   Lab Test 07/20/21  0847   A1C 6.7*             Failed - Patient's CR is NOT>1.4 OR Patient's EGFR is NOT<45 within past 12 mos.     Recent Labs   Lab Test 08/13/21 0317 07/20/21  0847 09/22/20  0930   GFRESTIMATED 41*   < > 44*   GFRESTBLACK  --   --  52*    < > = values in this interval not displayed.       Recent Labs   Lab Test 08/13/21 0317   CR 1.29*             Failed - Recent (6 mo) or future (30 days) visit within the authorizing provider's specialty     Patient had office visit in the last 6 months or has a visit in the next 30 days with authorizing provider or within the authorizing provider's specialty.  See \"Patient Info\" tab in inbasket, or \"Choose Columns\" in Meds & Orders section of the refill encounter.            Passed - Patient is age 10 or older        Passed - Patient does NOT have a diagnosis of CHF.        Passed - Medication is active on med list        Passed - Patient is not pregnant        Passed - Patient has not had a positive pregnancy test within the past 12 mos.              "

## 2023-05-10 NOTE — TELEPHONE ENCOUNTER
Spoke with patient and informed of note. She declined to schedule today. Will call another day to schedule.     Closing encounter.     Jacquelyn Nicole MA

## 2023-06-20 DIAGNOSIS — E78.5 HYPERLIPIDEMIA LDL GOAL <100: ICD-10-CM

## 2023-06-20 DIAGNOSIS — E11.65 TYPE 2 DIABETES MELLITUS WITH HYPERGLYCEMIA, WITHOUT LONG-TERM CURRENT USE OF INSULIN (H): ICD-10-CM

## 2023-06-20 NOTE — TELEPHONE ENCOUNTER
"Requested Prescriptions   Pending Prescriptions Disp Refills    simvastatin (ZOCOR) 10 MG tablet 90 tablet 0     Sig: TAKE ONE TABLET BY MOUTH AT BEDTIME       Statins Protocol Failed - 6/20/2023  3:43 PM        Failed - LDL on file in past 12 months     Recent Labs   Lab Test 07/20/21  0847   LDL 65               Failed - Recent (12 mo) or future (30 days) visit within the authorizing provider's specialty     Patient has had an office visit with the authorizing provider or a provider within the authorizing providers department within the previous 12 mos or has a future within next 30 days. See \"Patient Info\" tab in inbasket, or \"Choose Columns\" in Meds & Orders section of the refill encounter.              Passed - No abnormal creatine kinase in past 12 months     No lab results found.             Passed - Medication is active on med list        Passed - Patient is age 18 or older        Passed - No active pregnancy on record        Passed - No positive pregnancy test in past 12 months             DENNIS AlvaradoN, RN     "

## 2023-06-21 RX ORDER — SIMVASTATIN 10 MG
TABLET ORAL
Qty: 90 TABLET | Refills: 0 | Status: SHIPPED | OUTPATIENT
Start: 2023-06-21 | End: 2023-09-15

## 2023-07-17 DIAGNOSIS — E78.5 HYPERLIPIDEMIA LDL GOAL <100: ICD-10-CM

## 2023-07-18 RX ORDER — AMLODIPINE BESYLATE 5 MG/1
5 TABLET ORAL DAILY
Qty: 90 TABLET | Refills: 1 | Status: SHIPPED | OUTPATIENT
Start: 2023-07-18 | End: 2023-11-21

## 2023-07-18 NOTE — TELEPHONE ENCOUNTER
"Routing refill request to provider for review/approval because:  Future Appointments 7/18/2023 - 1/14/2024      None          Sending to scheduling for yearly office visit due    Requested Prescriptions   Pending Prescriptions Disp Refills    amLODIPine (NORVASC) 5 MG tablet 90 tablet 1     Sig: Take 1 tablet (5 mg) by mouth daily       Calcium Channel Blockers Protocol  Failed - 7/17/2023 11:39 AM        Failed - Blood pressure under 140/90 in past 12 months     BP Readings from Last 3 Encounters:   04/13/22 139/79   03/22/22 (!) 159/84   02/27/22 (!) 162/83                 Failed - Recent (12 mo) or future (30 days) visit within the authorizing provider's specialty     Patient has had an office visit with the authorizing provider or a provider within the authorizing providers department within the previous 12 mos or has a future within next 30 days. See \"Patient Info\" tab in inbasket, or \"Choose Columns\" in Meds & Orders section of the refill encounter.              Failed - Normal serum creatinine on file in past 12 months     Recent Labs   Lab Test 08/13/21  0317   CR 1.29*       Ok to refill medication if creatinine is low          Passed - Medication is active on med list        Passed - Patient is age 18 or older        Passed - No active pregnancy on record        Passed - No positive pregnancy test in past 12 months                   "

## 2023-07-20 DIAGNOSIS — E11.65 TYPE 2 DIABETES MELLITUS WITH HYPERGLYCEMIA, WITHOUT LONG-TERM CURRENT USE OF INSULIN (H): ICD-10-CM

## 2023-07-20 RX ORDER — LISINOPRIL 10 MG/1
10 TABLET ORAL DAILY
Qty: 90 TABLET | Refills: 0 | Status: SHIPPED | OUTPATIENT
Start: 2023-07-20 | End: 2023-10-05

## 2023-07-20 NOTE — TELEPHONE ENCOUNTER
Spoke with patient and informed of note below. Patient sates she is healthy and doing okay. 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 Is unable to scheduled a visit for herself.     Jacquelyn Nicole MA

## 2023-07-20 NOTE — TELEPHONE ENCOUNTER
Pending Prescriptions:                       Disp   Refills    lisinopril (ZESTRIL) 10 MG tablet          90 tab*0        Sig: Take 1 tablet (10 mg) by mouth daily      Routing refill request to provider for review/approval because:  Alissa given x1 and patient did not follow up, please advise    Katina Henderson RN on 7/20/2023 at 3:28 PM

## 2023-08-29 DIAGNOSIS — E11.65 TYPE 2 DIABETES MELLITUS WITH HYPERGLYCEMIA, WITHOUT LONG-TERM CURRENT USE OF INSULIN (H): ICD-10-CM

## 2023-08-30 RX ORDER — METFORMIN HCL 500 MG
TABLET, EXTENDED RELEASE 24 HR ORAL
Qty: 270 TABLET | Refills: 0 | Status: SHIPPED | OUTPATIENT
Start: 2023-08-30 | End: 2023-11-21

## 2023-09-15 DIAGNOSIS — E78.5 HYPERLIPIDEMIA LDL GOAL <100: ICD-10-CM

## 2023-09-15 DIAGNOSIS — E11.65 TYPE 2 DIABETES MELLITUS WITH HYPERGLYCEMIA, WITHOUT LONG-TERM CURRENT USE OF INSULIN (H): ICD-10-CM

## 2023-09-15 RX ORDER — SIMVASTATIN 10 MG
TABLET ORAL
Qty: 60 TABLET | Refills: 0 | Status: SHIPPED | OUTPATIENT
Start: 2023-09-15 | End: 2023-11-21

## 2023-09-15 NOTE — TELEPHONE ENCOUNTER
The patient will need to set up a face-to-face appointment with me prior to any subsequent refills.    Please help the patient set up an appointment.    Thank you.  Caro

## 2023-09-20 ENCOUNTER — IMMUNIZATION (OUTPATIENT)
Dept: FAMILY MEDICINE | Facility: OTHER | Age: 73
End: 2023-09-20
Payer: COMMERCIAL

## 2023-09-20 PROCEDURE — 90686 IIV4 VACC NO PRSV 0.5 ML IM: CPT

## 2023-09-20 PROCEDURE — G0008 ADMIN INFLUENZA VIRUS VAC: HCPCS

## 2023-10-05 ENCOUNTER — TELEPHONE (OUTPATIENT)
Dept: INTERNAL MEDICINE | Facility: CLINIC | Age: 73
End: 2023-10-05
Payer: COMMERCIAL

## 2023-10-05 DIAGNOSIS — E11.65 TYPE 2 DIABETES MELLITUS WITH HYPERGLYCEMIA, WITHOUT LONG-TERM CURRENT USE OF INSULIN (H): ICD-10-CM

## 2023-10-06 RX ORDER — LISINOPRIL 10 MG/1
10 TABLET ORAL DAILY
Qty: 90 TABLET | Refills: 0 | Status: SHIPPED | OUTPATIENT
Start: 2023-10-06 | End: 2023-11-21

## 2023-11-21 ENCOUNTER — OFFICE VISIT (OUTPATIENT)
Dept: INTERNAL MEDICINE | Facility: CLINIC | Age: 73
End: 2023-11-21
Payer: COMMERCIAL

## 2023-11-21 VITALS
DIASTOLIC BLOOD PRESSURE: 64 MMHG | HEIGHT: 63 IN | OXYGEN SATURATION: 97 % | BODY MASS INDEX: 30.3 KG/M2 | WEIGHT: 171 LBS | HEART RATE: 72 BPM | TEMPERATURE: 97.9 F | SYSTOLIC BLOOD PRESSURE: 128 MMHG

## 2023-11-21 DIAGNOSIS — E11.65 TYPE 2 DIABETES MELLITUS WITH HYPERGLYCEMIA, WITHOUT LONG-TERM CURRENT USE OF INSULIN (H): ICD-10-CM

## 2023-11-21 DIAGNOSIS — Z12.11 SCREEN FOR COLON CANCER: ICD-10-CM

## 2023-11-21 DIAGNOSIS — N18.31 STAGE 3A CHRONIC KIDNEY DISEASE (H): ICD-10-CM

## 2023-11-21 DIAGNOSIS — I10 ESSENTIAL HYPERTENSION: ICD-10-CM

## 2023-11-21 DIAGNOSIS — E78.5 HYPERLIPIDEMIA LDL GOAL <100: ICD-10-CM

## 2023-11-21 DIAGNOSIS — M85.89 OSTEOPENIA OF MULTIPLE SITES: ICD-10-CM

## 2023-11-21 DIAGNOSIS — Z12.31 VISIT FOR SCREENING MAMMOGRAM: ICD-10-CM

## 2023-11-21 DIAGNOSIS — Z00.00 MEDICARE ANNUAL WELLNESS VISIT, SUBSEQUENT: Primary | ICD-10-CM

## 2023-11-21 LAB
ANION GAP SERPL CALCULATED.3IONS-SCNC: 11 MMOL/L (ref 7–15)
BUN SERPL-MCNC: 16.3 MG/DL (ref 8–23)
CALCIUM SERPL-MCNC: 10.1 MG/DL (ref 8.8–10.2)
CHLORIDE SERPL-SCNC: 103 MMOL/L (ref 98–107)
CHOLEST SERPL-MCNC: 156 MG/DL
CREAT SERPL-MCNC: 1.2 MG/DL (ref 0.51–0.95)
DEPRECATED HCO3 PLAS-SCNC: 26 MMOL/L (ref 22–29)
EGFRCR SERPLBLD CKD-EPI 2021: 47 ML/MIN/1.73M2
ERYTHROCYTE [DISTWIDTH] IN BLOOD BY AUTOMATED COUNT: 13.5 % (ref 10–15)
GLUCOSE SERPL-MCNC: 108 MG/DL (ref 70–99)
HBA1C MFR BLD: 6.4 %
HCT VFR BLD AUTO: 39.5 % (ref 35–47)
HDLC SERPL-MCNC: 45 MG/DL
HGB BLD-MCNC: 13.2 G/DL (ref 11.7–15.7)
LDLC SERPL CALC-MCNC: 53 MG/DL
MCH RBC QN AUTO: 28.8 PG (ref 26.5–33)
MCHC RBC AUTO-ENTMCNC: 33.4 G/DL (ref 31.5–36.5)
MCV RBC AUTO: 86 FL (ref 78–100)
NONHDLC SERPL-MCNC: 111 MG/DL
PLATELET # BLD AUTO: 160 10E3/UL (ref 150–450)
POTASSIUM SERPL-SCNC: 4.6 MMOL/L (ref 3.4–5.3)
RBC # BLD AUTO: 4.58 10E6/UL (ref 3.8–5.2)
SODIUM SERPL-SCNC: 140 MMOL/L (ref 135–145)
TRIGL SERPL-MCNC: 291 MG/DL
WBC # BLD AUTO: 9.2 10E3/UL (ref 4–11)

## 2023-11-21 PROCEDURE — 80048 BASIC METABOLIC PNL TOTAL CA: CPT | Performed by: INTERNAL MEDICINE

## 2023-11-21 PROCEDURE — 36415 COLL VENOUS BLD VENIPUNCTURE: CPT | Performed by: INTERNAL MEDICINE

## 2023-11-21 PROCEDURE — 99207 PR FOOT EXAM NO CHARGE: CPT | Performed by: INTERNAL MEDICINE

## 2023-11-21 PROCEDURE — 85027 COMPLETE CBC AUTOMATED: CPT | Performed by: INTERNAL MEDICINE

## 2023-11-21 PROCEDURE — G0439 PPPS, SUBSEQ VISIT: HCPCS | Performed by: INTERNAL MEDICINE

## 2023-11-21 PROCEDURE — 80061 LIPID PANEL: CPT | Performed by: INTERNAL MEDICINE

## 2023-11-21 PROCEDURE — 83036 HEMOGLOBIN GLYCOSYLATED A1C: CPT | Performed by: INTERNAL MEDICINE

## 2023-11-21 RX ORDER — LISINOPRIL 10 MG/1
10 TABLET ORAL DAILY
Qty: 90 TABLET | Refills: 3 | Status: SHIPPED | OUTPATIENT
Start: 2023-11-21

## 2023-11-21 RX ORDER — SIMVASTATIN 10 MG
TABLET ORAL
Qty: 60 TABLET | Refills: 3 | Status: SHIPPED | OUTPATIENT
Start: 2023-11-21 | End: 2024-07-15

## 2023-11-21 RX ORDER — METFORMIN HCL 500 MG
TABLET, EXTENDED RELEASE 24 HR ORAL
Qty: 270 TABLET | Refills: 3 | Status: SHIPPED | OUTPATIENT
Start: 2023-11-21

## 2023-11-21 RX ORDER — AMLODIPINE BESYLATE 5 MG/1
5 TABLET ORAL DAILY
Qty: 90 TABLET | Refills: 3 | Status: SHIPPED | OUTPATIENT
Start: 2023-11-21

## 2023-11-21 RX ORDER — ASPIRIN 81 MG/1
81 TABLET ORAL DAILY
Qty: 90 TABLET | Refills: 3 | Status: SHIPPED | OUTPATIENT
Start: 2023-11-21

## 2023-11-21 RX ORDER — RESPIRATORY SYNCYTIAL VIRUS VACCINE 120MCG/0.5
0.5 KIT INTRAMUSCULAR ONCE
Qty: 1 EACH | Refills: 0 | Status: CANCELLED | OUTPATIENT
Start: 2023-11-21 | End: 2023-11-21

## 2023-11-21 ASSESSMENT — ENCOUNTER SYMPTOMS
FEVER: 0
PARESTHESIAS: 0
EYE PAIN: 0
FREQUENCY: 0
COUGH: 0
HEARTBURN: 0
DIARRHEA: 0
ARTHRALGIAS: 0
JOINT SWELLING: 0
PALPITATIONS: 0
DIZZINESS: 0
CONSTIPATION: 0
NERVOUS/ANXIOUS: 0
BREAST MASS: 0
ABDOMINAL PAIN: 0
MYALGIAS: 0
SORE THROAT: 0
HEADACHES: 0
HEMATOCHEZIA: 0
NAUSEA: 0
CHILLS: 0
HEMATURIA: 0
DYSURIA: 0
SHORTNESS OF BREATH: 0
WEAKNESS: 0

## 2023-11-21 ASSESSMENT — PAIN SCALES - GENERAL: PAINLEVEL: NO PAIN (0)

## 2023-11-21 ASSESSMENT — ACTIVITIES OF DAILY LIVING (ADL): CURRENT_FUNCTION: NO ASSISTANCE NEEDED

## 2023-11-21 NOTE — PROGRESS NOTES
"SUBJECTIVE:   Mera is a 73 year old, presenting for the following:  Wellness Visit        11/21/2023     1:43 PM   Additional Questions   Roomed by Shavonne WASHINGTON       Are you in the first 12 months of your Medicare coverage?  No    Healthy Habits:     In general, how would you rate your overall health?  Excellent    Do you usually eat at least 4 servings of fruit and vegetables a day, include whole grains    & fiber and avoid regularly eating high fat or \"junk\" foods?  Yes    Taking medications regularly:  Yes    Medication side effects:  None    Ability to successfully perform activities of daily living:  No assistance needed    Home Safety:  No safety concerns identified    Hearing Impairment:  No hearing concerns    In the past 6 months, have you been bothered by leaking of urine?  No    In general, how would you rate your overall mental or emotional health?  Excellent    Additional concerns today:  No      Have you ever done Advance Care Planning? (For example, a Health Directive, POLST, or a discussion with a medical provider or your loved ones about your wishes): No, advance care planning information given to patient to review.  Patient declined advance care planning discussion at this time.       Fall risk  Fallen 2 or more times in the past year?: No  Any fall with injury in the past year?: No    Cognitive Screening   1) Repeat 3 items (Leader, Season, Table)    2) Clock draw: NORMAL  3) 3 item recall: Recalls 3 objects  Results: 3 items recalled: COGNITIVE IMPAIRMENT LESS LIKELY    Mini-CogTM Copyright S Jorge. Licensed by the author for use in [facility]; reprinted with permission (lory@.[facility]). All rights reserved.      Do you have sleep apnea, excessive snoring or daytime drowsiness? : no    Reviewed and updated as needed this visit by clinical staff   Tobacco  Allergies  Meds              Reviewed and updated as needed this visit by Provider                 Social History     Tobacco Use "    Smoking status: Never    Smokeless tobacco: Never   Substance Use Topics    Alcohol use: Yes     Alcohol/week: 0.0 standard drinks of alcohol     Comment: once in a while             11/21/2023     1:53 PM   Alcohol Use   Prescreen: >3 drinks/day or >7 drinks/week? No     Do you have a current opioid prescription? No  Do you use any other controlled substances or medications that are not prescribed by a provider? None          -------------------------------------    Current providers sharing in care for this patient include:   Patient Care Team:  William Elizodno DO as PCP - General (Internal Medicine)  William Elizondo DO as Assigned PCP  Zeus Cobos MD as Assigned Musculoskeletal Provider    The following health maintenance items are reviewed in Epic and correct as of today:  Health Maintenance   Topic Date Due    ANNUAL REVIEW OF HM ORDERS  Never done    RSV VACCINE (Pregnancy & 60+) (1 - 1-dose 60+ series) Never done    Pneumococcal Vaccine: 65+ Years (2 - PPSV23 or PCV20) 01/02/2017    COLORECTAL CANCER SCREENING  07/30/2019    MEDICARE ANNUAL WELLNESS VISIT  09/22/2021    MICROALBUMIN  09/22/2021    DIABETIC FOOT EXAM  09/22/2021    MAMMO SCREENING  10/08/2021    A1C  01/20/2022    EYE EXAM  06/09/2022    LIPID  07/20/2022    BMP  08/13/2022    HEMOGLOBIN  08/13/2022    ZOSTER IMMUNIZATION (3 of 3) 10/17/2022    COVID-19 Vaccine (4 - 2023-24 season) 09/01/2023    FALL RISK ASSESSMENT  11/21/2024    DTAP/TDAP/TD IMMUNIZATION (2 - Td or Tdap) 02/19/2025    ADVANCE CARE PLANNING  11/21/2028    DEXA  04/08/2030    HEPATITIS C SCREENING  Completed    PHQ-2 (once per calendar year)  Completed    INFLUENZA VACCINE  Completed    URINALYSIS  Completed    IPV IMMUNIZATION  Aged Out    HPV IMMUNIZATION  Aged Out    MENINGITIS IMMUNIZATION  Aged Out    RSV MONOCLONAL ANTIBODY  Aged Out     Lab work is in process  Labs reviewed in EPIC  BP Readings from Last 3 Encounters:   11/21/23  128/64   04/13/22 139/79   03/22/22 (!) 159/84    Wt Readings from Last 3 Encounters:   11/21/23 77.6 kg (171 lb)   05/04/22 81.2 kg (179 lb)   04/13/22 81.2 kg (179 lb)                  Patient Active Problem List   Diagnosis    Advanced directives, counseling/discussion    Elevated triglycerides with high cholesterol    Hyperlipidemia LDL goal <100    Endometrial cells on cervical Pap smear inconsistent w/LMP    Benign essential hypertension    Acute anterior circulation transient ischemic attack    CKD (chronic kidney disease) stage 3, GFR 30-59 ml/min (H)    Type 2 diabetes mellitus with hyperglycemia, without long-term current use of insulin (H)    Osteopenia of multiple sites    Closed nondisplaced intertrochanteric fracture of right femur with routine healing, subsequent encounter    Closed nondisplaced fracture of greater trochanter of left femur, initial encounter (H)    Closed 2-part displaced fracture of surgical neck of right humerus     Past Surgical History:   Procedure Laterality Date    DILATION AND CURETTAGE, OPERATIVE HYSTEROSCOPY, COMBINED N/A 2/16/2016    Procedure: COMBINED DILATION AND CURETTAGE, OPERATIVE HYSTEROSCOPY;  Surgeon: Jenae Walden MD;  Location:  OR    HC FLEX SIGMOIDOSCOPY W/WO DELILAH SPEC BY BRUSH/WASH  1997    HC REVISE MEDIAN N/CARPAL TUNNEL SURG  1985?       Social History     Tobacco Use    Smoking status: Never    Smokeless tobacco: Never   Substance Use Topics    Alcohol use: Yes     Alcohol/week: 0.0 standard drinks of alcohol     Comment: once in a while     Family History   Problem Relation Age of Onset    Breast Cancer Sister     Cancer - colorectal Mother     Cardiovascular Father         QUADRUPLE BYPASS IN 1992         Current Outpatient Medications   Medication Sig Dispense Refill    acetaminophen (TYLENOL) 500 MG tablet Take 1,000 mg by mouth every 4 hours as needed for mild pain      amLODIPine (NORVASC) 5 MG tablet Take 1 tablet (5 mg) by mouth daily 90  tablet 3    aspirin 81 MG EC tablet Take 1 tablet (81 mg) by mouth daily 90 tablet 3    calcium-vitamin D (CALTRATE) 600-400 MG-UNIT per tablet Take 1 tablet by mouth 2 times daily      capsaicin (ZOSTRIX) 0.025 % external cream Apply topically 3 times daily      ibuprofen (ADVIL/MOTRIN) 200 MG tablet Take 400 mg by mouth every 4 hours as needed for mild pain      lisinopril (ZESTRIL) 10 MG tablet Take 1 tablet (10 mg) by mouth daily 90 tablet 3    metFORMIN (GLUCOPHAGE XR) 500 MG 24 hr tablet TAKE THREE TABLETS BY MOUTH EVERY DAY WITH DINNER 270 tablet 3    simvastatin (ZOCOR) 10 MG tablet TAKE ONE TABLET BY MOUTH AT BEDTIME 60 tablet 3    thin (NO BRAND SPECIFIED) lancets Use with lanceting device. To accompany: Blood Glucose Monitor Brands: per insurance. (Patient not taking: Reported on 11/21/2023) 100 each 6     Allergies   Allergen Reactions    Nitrous Oxide      Doesn't recall any reaction to this drug     Mammogram Screening: Mammogram Screening: Recommended mammography every 1-2 years with patient discussion and risk factor consideration        Review of Systems   Constitutional:  Negative for chills and fever.   HENT:  Negative for congestion, ear pain, hearing loss and sore throat.    Eyes:  Negative for pain and visual disturbance.   Respiratory:  Negative for cough and shortness of breath.    Cardiovascular:  Negative for chest pain, palpitations and peripheral edema.   Gastrointestinal:  Negative for abdominal pain, constipation, diarrhea, heartburn, hematochezia and nausea.   Breasts:  Negative for tenderness, breast mass and discharge.   Genitourinary:  Negative for dysuria, frequency, genital sores, hematuria, pelvic pain, urgency, vaginal bleeding and vaginal discharge.   Musculoskeletal:  Negative for arthralgias, joint swelling and myalgias.   Skin:  Negative for rash.   Neurological:  Negative for dizziness, weakness, headaches and paresthesias.   Psychiatric/Behavioral:  Negative for mood  "changes. The patient is not nervous/anxious.      CONSTITUTIONAL: NEGATIVE for fever, chills, change in weight  INTEGUMENTARY/SKIN: NEGATIVE for worrisome rashes, moles or lesions  EYES: NEGATIVE for vision changes or irritation  ENT/MOUTH: NEGATIVE for ear, mouth and throat problems  RESP: NEGATIVE for significant cough or SOB  BREAST: NEGATIVE for masses, tenderness or discharge  CV: NEGATIVE for chest pain, palpitations or peripheral edema  GI: NEGATIVE for nausea, abdominal pain, heartburn, or change in bowel habits  : NEGATIVE for frequency, dysuria, or hematuria  MUSCULOSKELETAL: NEGATIVE for significant arthralgias or myalgia  NEURO: NEGATIVE for weakness, dizziness or paresthesias  ENDOCRINE: NEGATIVE for temperature intolerance, skin/hair changes  HEME: NEGATIVE for bleeding problems  PSYCHIATRIC: NEGATIVE for changes in mood or affect    OBJECTIVE:   /64   Pulse 72   Temp 97.9  F (36.6  C) (Tympanic)   Ht 1.608 m (5' 3.31\")   Wt 77.6 kg (171 lb)   SpO2 97%   BMI 30.00 kg/m   Estimated body mass index is 30 kg/m  as calculated from the following:    Height as of this encounter: 1.608 m (5' 3.31\").    Weight as of this encounter: 77.6 kg (171 lb).  Physical Exam  GENERAL APPEARANCE: healthy, alert and no distress  EYES: Eyes grossly normal to inspection, PERRL and conjunctivae and sclerae normal  HENT: ear canals and TM's normal, nose and mouth without ulcers or lesions, oropharynx clear and oral mucous membranes moist  NECK: no adenopathy, no asymmetry, masses, or scars and thyroid normal to palpation  RESP: lungs clear to auscultation - no rales, rhonchi or wheezes  CV: regular rate and rhythm, normal S1 S2, no S3 or S4, no murmur, click or rub, no peripheral edema and peripheral pulses strong  ABDOMEN: soft, nontender, no hepatosplenomegaly, no masses and bowel sounds normal  MS: no musculoskeletal defects are noted and gait is age appropriate without ataxia  SKIN: no suspicious lesions or " jael  NEURO: Normal strength and tone, sensory exam grossly normal, mentation intact and speech normal  PSYCH: mentation appears normal and affect normal/bright    Diagnostic Test Results:  Labs reviewed in Epic  Results for orders placed or performed in visit on 11/21/23 (from the past 24 hour(s))   HEMOGLOBIN A1C   Result Value Ref Range    Hemoglobin A1C 6.4 (H) <5.7 %   Lipid panel reflex to direct LDL Non-fasting   Result Value Ref Range    Cholesterol 156 <200 mg/dL    Triglycerides 291 (H) <150 mg/dL    Direct Measure HDL 45 (L) >=50 mg/dL    LDL Cholesterol Calculated 53 <=100 mg/dL    Non HDL Cholesterol 111 <130 mg/dL    Narrative    Cholesterol  Desirable:  <200 mg/dL    Triglycerides  Normal:  Less than 150 mg/dL  Borderline High:  150-199 mg/dL  High:  200-499 mg/dL  Very High:  Greater than or equal to 500 mg/dL    Direct Measure HDL  Female:  Greater than or equal to 50 mg/dL   Male:  Greater than or equal to 40 mg/dL    LDL Cholesterol  Desirable:  <100mg/dL  Above Desirable:  100-129 mg/dL   Borderline High:  130-159 mg/dL   High:  160-189 mg/dL   Very High:  >= 190 mg/dL    Non HDL Cholesterol  Desirable:  130 mg/dL  Above Desirable:  130-159 mg/dL  Borderline High:  160-189 mg/dL  High:  190-219 mg/dL  Very High:  Greater than or equal to 220 mg/dL   BASIC METABOLIC PANEL   Result Value Ref Range    Sodium 140 135 - 145 mmol/L    Potassium 4.6 3.4 - 5.3 mmol/L    Chloride 103 98 - 107 mmol/L    Carbon Dioxide (CO2) 26 22 - 29 mmol/L    Anion Gap 11 7 - 15 mmol/L    Urea Nitrogen 16.3 8.0 - 23.0 mg/dL    Creatinine 1.20 (H) 0.51 - 0.95 mg/dL    GFR Estimate 47 (L) >60 mL/min/1.73m2    Calcium 10.1 8.8 - 10.2 mg/dL    Glucose 108 (H) 70 - 99 mg/dL   CBC with platelets   Result Value Ref Range    WBC Count 9.2 4.0 - 11.0 10e3/uL    RBC Count 4.58 3.80 - 5.20 10e6/uL    Hemoglobin 13.2 11.7 - 15.7 g/dL    Hematocrit 39.5 35.0 - 47.0 %    MCV 86 78 - 100 fL    MCH 28.8 26.5 - 33.0 pg    MCHC 33.4  31.5 - 36.5 g/dL    RDW 13.5 10.0 - 15.0 %    Platelet Count 160 150 - 450 10e3/uL       ASSESSMENT / PLAN:       ICD-10-CM    1. Medicare annual wellness visit, subsequent  Z00.00       2. Type 2 diabetes mellitus with hyperglycemia, without long-term current use of insulin (H)  E11.65 HEMOGLOBIN A1C     Lipid panel reflex to direct LDL Non-fasting     FOOT EXAM     lisinopril (ZESTRIL) 10 MG tablet     metFORMIN (GLUCOPHAGE XR) 500 MG 24 hr tablet     simvastatin (ZOCOR) 10 MG tablet     HEMOGLOBIN A1C     Lipid panel reflex to direct LDL Non-fasting      3. Essential hypertension  I10 aspirin 81 MG EC tablet      4. Hyperlipidemia LDL goal <100  E78.5 amLODIPine (NORVASC) 5 MG tablet     simvastatin (ZOCOR) 10 MG tablet      5. Stage 3a chronic kidney disease (H)  N18.31 Albumin Random Urine Quantitative with Creat Ratio     BASIC METABOLIC PANEL     CBC with platelets     BASIC METABOLIC PANEL     CBC with platelets      6. Osteopenia of multiple sites  M85.89       7. Screen for colon cancer  Z12.11 Fecal colorectal cancer screen FIT - Future (S+30)     Fecal colorectal cancer screen FIT - Future (S+30)      8. Visit for screening mammogram  Z12.31 MA SCREENING DIGITAL BILAT - Future  (s+30)          Patient has been advised of split billing requirements and indicates understanding: Yes      COUNSELING:  Reviewed preventive health counseling, as reflected in patient instructions       Regular exercise       Healthy diet/nutrition       Vision screening       Hearing screening       Dental care       Bladder control       Colon cancer screening        She reports that she has never smoked. She has never used smokeless tobacco.      Appropriate preventive services were discussed with this patient, including applicable screening as appropriate for fall prevention, nutrition, physical activity, Tobacco-use cessation, weight loss and cognition.  Checklist reviewing preventive services available has been given to the  patient.    Reviewed patients plan of care and provided an AVS. The Basic Care Plan (routine screening as documented in Health Maintenance) for Mera meets the Care Plan requirement. This Care Plan has been established and reviewed with the Patient.      I have reviewed the patient's vaccination schedule and discussed the benefits of prophylactic vaccination in detail.  I recommend the patient contact their pharmacist for vaccinations.  Discussed that most insurance companies now favor reimbursement to the pharmacies and it will financially behoove the patient to have vaccinations performed at their pharmacy.          William Elizondo [facility]    Identified Health Risks:

## 2024-06-21 NOTE — TELEPHONE ENCOUNTER
"Requested Prescriptions   Pending Prescriptions Disp Refills     metFORMIN (GLUCOPHAGE-XR) 500 MG 24 hr tablet [Pharmacy Med Name: METFORMIN HCL ER 500MG TB24] 90 tablet 1     Sig: TAKE 3 TABLETS ( 1,500 MG) BY MOUTH DAILY ( WITH DINNER)   Last Written Prescription Date:  11/29/19  Last Fill Quantity: 90,  # refills: 3   Last office visit: 5/31/2018 with prescribing provider:  11/1/19   Future Office Visit:        Biguanide Agents Passed - 12/1/2019 11:31 AM        Passed - Blood pressure less than 140/90 in past 6 months     BP Readings from Last 3 Encounters:   11/01/19 134/82   02/12/19 118/72   06/22/18 143/83                 Passed - Patient has documented LDL within the past 12 mos.     Recent Labs   Lab Test 02/12/19  1348   LDL 65             Passed - Patient has had a Microalbumin in the past 15 mos.     Recent Labs   Lab Test 02/12/19  1344   MICROL 27   UMALCR 11.74             Passed - Patient is age 10 or older        Passed - Patient has documented A1c within the specified period of time.     If HgbA1C is 8 or greater, it needs to be on file within the past 3 months.  If less than 8, must be on file within the past 6 months.     Recent Labs   Lab Test 11/01/19  1051   A1C 7.7*             Passed - Patient's CR is NOT>1.4 OR Patient's EGFR is NOT<45 within past 12 mos.     Recent Labs   Lab Test 11/01/19  1051   GFRESTIMATED 57*   GFRESTBLACK 66       Recent Labs   Lab Test 11/01/19  1051   CR 1.00             Passed - Patient does NOT have a diagnosis of CHF.        Passed - Medication is active on med list        Passed - Patient is not pregnant        Passed - Patient has not had a positive pregnancy test within the past 12 mos.         Passed - Recent (6 mo) or future (30 days) visit within the authorizing provider's specialty     Patient had office visit in the last 6 months or has a visit in the next 30 days with authorizing provider or within the authorizing provider's specialty.  See \"Patient " "Info\" tab in inbasket, or \"Choose Columns\" in Meds & Orders section of the refill encounter.            " ACP

## 2024-07-15 DIAGNOSIS — E78.5 HYPERLIPIDEMIA LDL GOAL <100: ICD-10-CM

## 2024-07-15 DIAGNOSIS — E11.65 TYPE 2 DIABETES MELLITUS WITH HYPERGLYCEMIA, WITHOUT LONG-TERM CURRENT USE OF INSULIN (H): ICD-10-CM

## 2024-07-15 RX ORDER — SIMVASTATIN 10 MG
TABLET ORAL
Qty: 60 TABLET | Refills: 3 | Status: SHIPPED | OUTPATIENT
Start: 2024-07-15

## 2024-10-03 ENCOUNTER — TELEPHONE (OUTPATIENT)
Dept: INTERNAL MEDICINE | Facility: CLINIC | Age: 74
End: 2024-10-03
Payer: COMMERCIAL

## 2024-10-03 NOTE — TELEPHONE ENCOUNTER
Reason for Call:  Appointment Request    Patient requesting this type of appt:  Preventive     Requested provider: William Elizondo    Reason patient unable to be scheduled: Not within requested timeframe    When does patient want to be seen/preferred time:  IN NOVEMBER    Comments: SHE IS DOING [facility] [employer] [facility] AND WOULD LIKE TO DO APPOINTMENT OVER THE PHONE.    Okay to leave a detailed message?: Yes at Home number on file [phone] (home)    Call taken on 10/3/2024 at 2:30 PM by Jaci Guillen

## 2024-11-25 ENCOUNTER — TELEPHONE (OUTPATIENT)
Dept: INTERNAL MEDICINE | Facility: CLINIC | Age: 74
End: 2024-11-25
Payer: COMMERCIAL

## 2024-11-25 NOTE — TELEPHONE ENCOUNTER
Patient has appointment tomorrow morning, wants to know if she should take her medications as scheduled. Advised to take medications as scheduled as she takes these regularly as scheduled.     Jenae Holland, RN, BSN

## 2024-11-26 ENCOUNTER — OFFICE VISIT (OUTPATIENT)
Dept: INTERNAL MEDICINE | Facility: CLINIC | Age: 74
End: 2024-11-26
Payer: COMMERCIAL

## 2024-11-26 VITALS
BODY MASS INDEX: 29.12 KG/M2 | TEMPERATURE: 97.6 F | OXYGEN SATURATION: 95 % | DIASTOLIC BLOOD PRESSURE: 58 MMHG | WEIGHT: 170.6 LBS | HEART RATE: 79 BPM | HEIGHT: 64 IN | RESPIRATION RATE: 16 BRPM | SYSTOLIC BLOOD PRESSURE: 106 MMHG

## 2024-11-26 DIAGNOSIS — I10 ESSENTIAL HYPERTENSION: ICD-10-CM

## 2024-11-26 DIAGNOSIS — N18.31 STAGE 3A CHRONIC KIDNEY DISEASE (H): ICD-10-CM

## 2024-11-26 DIAGNOSIS — Z12.31 ENCOUNTER FOR SCREENING MAMMOGRAM FOR BREAST CANCER: ICD-10-CM

## 2024-11-26 DIAGNOSIS — E78.5 HYPERLIPIDEMIA LDL GOAL <100: ICD-10-CM

## 2024-11-26 DIAGNOSIS — E11.65 TYPE 2 DIABETES MELLITUS WITH HYPERGLYCEMIA, WITHOUT LONG-TERM CURRENT USE OF INSULIN (H): Primary | ICD-10-CM

## 2024-11-26 DIAGNOSIS — Z12.11 SCREEN FOR COLON CANCER: ICD-10-CM

## 2024-11-26 LAB
ALBUMIN SERPL BCG-MCNC: 4.3 G/DL (ref 3.5–5.2)
ALP SERPL-CCNC: 87 U/L (ref 40–150)
ALT SERPL W P-5'-P-CCNC: 17 U/L (ref 0–50)
ANION GAP SERPL CALCULATED.3IONS-SCNC: 11 MMOL/L (ref 7–15)
AST SERPL W P-5'-P-CCNC: 20 U/L (ref 0–45)
BILIRUB DIRECT SERPL-MCNC: <0.2 MG/DL (ref 0–0.3)
BILIRUB SERPL-MCNC: 0.7 MG/DL
BUN SERPL-MCNC: 15.2 MG/DL (ref 8–23)
CALCIUM SERPL-MCNC: 9.8 MG/DL (ref 8.8–10.4)
CHLORIDE SERPL-SCNC: 104 MMOL/L (ref 98–107)
CHOLEST SERPL-MCNC: 147 MG/DL
CREAT SERPL-MCNC: 1.23 MG/DL (ref 0.51–0.95)
EGFRCR SERPLBLD CKD-EPI 2021: 46 ML/MIN/1.73M2
ERYTHROCYTE [DISTWIDTH] IN BLOOD BY AUTOMATED COUNT: 13.2 % (ref 10–15)
EST. AVERAGE GLUCOSE BLD GHB EST-MCNC: 137 MG/DL
FASTING STATUS PATIENT QL REPORTED: YES
FASTING STATUS PATIENT QL REPORTED: YES
GLUCOSE SERPL-MCNC: 150 MG/DL (ref 70–99)
HBA1C MFR BLD: 6.4 %
HCO3 SERPL-SCNC: 23 MMOL/L (ref 22–29)
HCT VFR BLD AUTO: 38.6 % (ref 35–47)
HDLC SERPL-MCNC: 46 MG/DL
HGB BLD-MCNC: 13.4 G/DL (ref 11.7–15.7)
LDLC SERPL CALC-MCNC: 59 MG/DL
MCH RBC QN AUTO: 29.6 PG (ref 26.5–33)
MCHC RBC AUTO-ENTMCNC: 34.7 G/DL (ref 31.5–36.5)
MCV RBC AUTO: 85 FL (ref 78–100)
NONHDLC SERPL-MCNC: 101 MG/DL
PLATELET # BLD AUTO: 160 10E3/UL (ref 150–450)
POTASSIUM SERPL-SCNC: 4.8 MMOL/L (ref 3.4–5.3)
PROT SERPL-MCNC: 6.8 G/DL (ref 6.4–8.3)
RBC # BLD AUTO: 4.53 10E6/UL (ref 3.8–5.2)
SODIUM SERPL-SCNC: 138 MMOL/L (ref 135–145)
TRIGL SERPL-MCNC: 212 MG/DL
WBC # BLD AUTO: 9 10E3/UL (ref 4–11)

## 2024-11-26 PROCEDURE — 80061 LIPID PANEL: CPT | Performed by: INTERNAL MEDICINE

## 2024-11-26 PROCEDURE — 85027 COMPLETE CBC AUTOMATED: CPT | Performed by: INTERNAL MEDICINE

## 2024-11-26 PROCEDURE — 36415 COLL VENOUS BLD VENIPUNCTURE: CPT | Performed by: INTERNAL MEDICINE

## 2024-11-26 PROCEDURE — 82248 BILIRUBIN DIRECT: CPT | Performed by: INTERNAL MEDICINE

## 2024-11-26 PROCEDURE — 83036 HEMOGLOBIN GLYCOSYLATED A1C: CPT | Performed by: INTERNAL MEDICINE

## 2024-11-26 PROCEDURE — 80053 COMPREHEN METABOLIC PANEL: CPT | Performed by: INTERNAL MEDICINE

## 2024-11-26 RX ORDER — METFORMIN HYDROCHLORIDE 500 MG/1
TABLET, EXTENDED RELEASE ORAL
Qty: 270 TABLET | Refills: 3 | Status: SHIPPED | OUTPATIENT
Start: 2024-11-26

## 2024-11-26 RX ORDER — AMLODIPINE BESYLATE 5 MG/1
5 TABLET ORAL DAILY
Qty: 90 TABLET | Refills: 3 | Status: SHIPPED | OUTPATIENT
Start: 2024-11-26

## 2024-11-26 RX ORDER — LISINOPRIL 10 MG/1
10 TABLET ORAL DAILY
Qty: 90 TABLET | Refills: 3 | Status: SHIPPED | OUTPATIENT
Start: 2024-11-26

## 2024-11-26 RX ORDER — ASPIRIN 81 MG/1
81 TABLET ORAL DAILY
Qty: 90 TABLET | Refills: 3 | Status: SHIPPED | OUTPATIENT
Start: 2024-11-26

## 2024-11-26 RX ORDER — SIMVASTATIN 10 MG
TABLET ORAL
Qty: 60 TABLET | Refills: 3 | Status: SHIPPED | OUTPATIENT
Start: 2024-11-26

## 2024-11-26 SDOH — HEALTH STABILITY: PHYSICAL HEALTH: ON AVERAGE, HOW MANY DAYS PER WEEK DO YOU ENGAGE IN MODERATE TO STRENUOUS EXERCISE (LIKE A BRISK WALK)?: 0 DAYS

## 2024-11-26 ASSESSMENT — PAIN SCALES - GENERAL: PAINLEVEL_OUTOF10: NO PAIN (0)

## 2024-11-26 ASSESSMENT — SOCIAL DETERMINANTS OF HEALTH (SDOH)
HOW OFTEN DO YOU GET TOGETHER WITH FRIENDS OR RELATIVES?: PATIENT DECLINED
HOW OFTEN DO YOU GET TOGETHER WITH FRIENDS OR RELATIVES?: PATIENT DECLINED

## 2024-11-26 NOTE — PROGRESS NOTES
Preventive Care Visit  [facility]  William Elizondo DO, Internal Medicine  Nov 26, 2024      Assessment & Plan     Type 2 diabetes mellitus with hyperglycemia, without long-term current use of insulin (H)    - HEMOGLOBIN A1C; Future  - Lipid panel reflex to direct LDL Non-fasting; Future  - lisinopril (ZESTRIL) 10 MG tablet; Take 1 tablet (10 mg) by mouth daily.  - metFORMIN (GLUCOPHAGE XR) 500 MG 24 hr tablet; TAKE THREE TABLETS BY MOUTH EVERY DAY WITH DINNER  - simvastatin (ZOCOR) 10 MG tablet; TAKE ONE TABLET BY MOUTH AT BEDTIME  - HEMOGLOBIN A1C  - Lipid panel reflex to direct LDL Non-fasting    Hyperlipidemia LDL goal <100    - amLODIPine (NORVASC) 5 MG tablet; Take 1 tablet (5 mg) by mouth daily.  - simvastatin (ZOCOR) 10 MG tablet; TAKE ONE TABLET BY MOUTH AT BEDTIME  - Hepatic panel (Albumin, ALT, AST, Bili, Alk Phos, TP); Future  - Hepatic panel (Albumin, ALT, AST, Bili, Alk Phos, TP)    Essential hypertension  Well-controlled.  Continue current medication.  Check lab work.  - aspirin 81 MG EC tablet; Take 1 tablet (81 mg) by mouth daily.  - UA Macroscopic with reflex to Microscopic and Culture - Lab Collect; Future  - UA Macroscopic with reflex to Microscopic and Culture - Lab Collect    Stage 3a chronic kidney disease (H)  Check renal function and CBC.  - Albumin Random Urine Quantitative with Creat Ratio; Future  - BASIC METABOLIC PANEL; Future  - CBC with platelets; Future  - Albumin Random Urine Quantitative with Creat Ratio  - BASIC METABOLIC PANEL  - CBC with platelets    Encounter for screening mammogram for breast cancer  Screening  - MA Screen Bilateral w/Ge; Future    Screen for colon cancer  Screening recommended  - Fecal colorectal cancer screen FIT - Future (S+30); Future  - Fecal colorectal cancer screen FIT - Future (S+30)    Patient has been advised of split billing requirements and indicates understanding: Yes        BMI  Estimated body mass  "index is 29.67 kg/m  as calculated from the following:    Height as of this encounter: 1.615 m (5' 3.58\").    Weight as of this encounter: 77.4 kg (170 lb 9.6 oz).       Counseling  Appropriate preventive services were addressed with this patient via screening, questionnaire, or discussion as appropriate for fall prevention, nutrition, physical activity, Tobacco-use cessation, social engagement, weight loss and cognition.  Checklist reviewing preventive services available has been given to the patient.  Reviewed patient's diet, addressing concerns and/or questions.   The patient was instructed to see the dentist every 6 months.       MEDICATIONS:  Continue current medications without change  Regular exercise    Subjective   Mera is a 74 year old, presenting for the following:  No chief complaint on file.        11/26/2024     9:47 AM   Additional Questions   Roomed by Lindsey NORTON          Health Care Directive  Patient does not have a Health Care Directive: Patient states has Advance Directive and will bring in a copy to clinic.      11/26/2024   General Health   How would you rate your overall physical health? Good   Feel stress (tense, anxious, or unable to sleep) Patient declined            11/26/2024   Nutrition   Diet: Regular (no restrictions)            11/26/2024   Exercise   Days per week of moderate/strenous exercise 0 days      (!) EXERCISE CONCERN      11/26/2024   Social Factors   Frequency of gathering with friends or relatives Patient declined   Worry food won't last until get money to buy more No   Food not last or not have enough money for food? No   Do you have housing? (Housing is defined as stable permanent housing and does not include staying ouside in a car, in a tent, in an abandoned building, in an overnight shelter, or couch-surfing.) Yes   Are you worried about losing your housing? No   Lack of transportation? No   Unable to get utilities (heat,electricity)? No            " 11/26/2024   Fall Risk   Fallen 2 or more times in the past year? No     No     No    Trouble with walking or balance? No     No     No        Patient-reported    Multiple values from one day are sorted in reverse-chronological order          11/26/2024   Activities of Daily Living- Home Safety   Needs help with the following daily activites None of the above   Safety concerns in the home None of the above            11/26/2024   Dental   Dentist two times every year? (!) NO            11/26/2024   Hearing Screening   Hearing concerns? None of the above            11/26/2024   Driving Risk Screening   Patient/family members have concerns about driving No            11/26/2024   General Alertness/Fatigue Screening   Have you been more tired than usual lately? (!) DECLINE            11/26/2024   Urinary Incontinence Screening   Bothered by leaking urine in past 6 months No               Today's PHQ-2 Score:       11/26/2024     9:47 AM   PHQ-2 ( 1999 [employer])   Q1: Little interest or pleasure in doing things 0    Q2: Feeling down, depressed or hopeless 0    PHQ-2 Score 0    Q1: Little interest or pleasure in doing things Not at all   Q2: Feeling down, depressed or hopeless Not at all   PHQ-2 Score 0       Patient-reported           11/26/2024   Substance Use   Alcohol more than 3/day or more than 7/wk Not Applicable   Do you have a current opioid prescription? No   How severe/bad is pain from 1 to 10? 0/10 (No Pain)   Do you use any other substances recreationally? No        Social History     Tobacco Use    Smoking status: Never    Smokeless tobacco: Never   Vaping Use    Vaping status: Never Used   Substance Use Topics    Alcohol use: Yes     Alcohol/week: 0.0 standard drinks of alcohol     Comment: once in a while    Drug use: No          Mammogram Screening - After age 74- determine frequency with patient based on health status, life expectancy and patient goals    ASCVD Risk   The 10-year ASCVD risk score  (Demetrio MICHELLE, et al., 2019) is: 26.2%    Values used to calculate the score:      Age: 74 years      Sex: Female      Is Non- : No      Diabetic: Yes      Tobacco smoker: No      Systolic Blood Pressure: 106 mmHg      Is BP treated: Yes      HDL Cholesterol: 45 mg/dL      Total Cholesterol: 156 mg/dL            Reviewed and updated as needed this visit by Provider                    Past Medical History:   Diagnosis Date    Basal cell carcinoma     Endometrial cells on cervical Pap smear inconsistent w/LMP 11/11/15    pap NIL/neg      Past Surgical History:   Procedure Laterality Date    DILATION AND CURETTAGE, OPERATIVE HYSTEROSCOPY, COMBINED N/A 2/16/2016    Procedure: COMBINED DILATION AND CURETTAGE, OPERATIVE HYSTEROSCOPY;  Surgeon: Jenae Walden MD;  Location: [facility] OR    HC FLEX SIGMOIDOSCOPY W/WO DELILAH SPEC BY BRUSH/WASH  1997    HC REVISE MEDIAN N/CARPAL TUNNEL SURG  1985?     OB History   No obstetric history on file.     Lab work is in process  Labs reviewed in EPIC  BP Readings from Last 3 Encounters:   11/26/24 106/58   11/21/23 128/64   04/13/22 139/79    Wt Readings from Last 3 Encounters:   11/26/24 77.4 kg (170 lb 9.6 oz)   11/21/23 77.6 kg (171 lb)   05/04/22 81.2 kg (179 lb)                  Patient Active Problem List   Diagnosis    Elevated triglycerides with high cholesterol    Hyperlipidemia LDL goal <100    Endometrial cells on cervical Pap smear inconsistent w/LMP    Benign essential hypertension    Acute anterior circulation transient ischemic attack    CKD (chronic kidney disease) stage 3, GFR 30-59 ml/min (H)    Type 2 diabetes mellitus with hyperglycemia, without long-term current use of insulin (H)    Osteopenia of multiple sites    Closed nondisplaced intertrochanteric fracture of right femur with routine healing, subsequent encounter    Closed nondisplaced fracture of greater trochanter of left femur, initial encounter (H)    Closed 2-part displaced fracture  of surgical neck of right humerus     Past Surgical History:   Procedure Laterality Date    DILATION AND CURETTAGE, OPERATIVE HYSTEROSCOPY, COMBINED N/A 2/16/2016    Procedure: COMBINED DILATION AND CURETTAGE, OPERATIVE HYSTEROSCOPY;  Surgeon: Jenae Walden MD;  Location: [facility] OR    HC FLEX SIGMOIDOSCOPY W/WO DELILAH SPEC BY BRUSH/WASH  1997    HC REVISE MEDIAN N/CARPAL TUNNEL SURG  1985?       Social History     Tobacco Use    Smoking status: Never    Smokeless tobacco: Never   Substance Use Topics    Alcohol use: Yes     Alcohol/week: 0.0 standard drinks of alcohol     Comment: once in a while     Family History   Problem Relation Age of Onset    Breast Cancer Sister     Cancer - colorectal Mother     Cardiovascular Father         QUADRUPLE BYPASS IN 1992         Current Outpatient Medications   Medication Sig Dispense Refill    acetaminophen (TYLENOL) 500 MG tablet Take 1,000 mg by mouth every 4 hours as needed for mild pain      amLODIPine (NORVASC) 5 MG tablet Take 1 tablet (5 mg) by mouth daily. 90 tablet 3    aspirin 81 MG EC tablet Take 1 tablet (81 mg) by mouth daily. 90 tablet 3    calcium-vitamin D (CALTRATE) 600-400 MG-UNIT per tablet Take 1 tablet by mouth 2 times daily      capsaicin (ZOSTRIX) 0.025 % external cream Apply topically 3 times daily      ibuprofen (ADVIL/MOTRIN) 200 MG tablet Take 400 mg by mouth every 4 hours as needed for mild pain      lisinopril (ZESTRIL) 10 MG tablet Take 1 tablet (10 mg) by mouth daily. 90 tablet 3    metFORMIN (GLUCOPHAGE XR) 500 MG 24 hr tablet TAKE THREE TABLETS BY MOUTH EVERY DAY WITH DINNER 270 tablet 3    simvastatin (ZOCOR) 10 MG tablet TAKE ONE TABLET BY MOUTH AT BEDTIME 60 tablet 3    thin (NO BRAND SPECIFIED) lancets Use with lanceting device. To accompany: Blood Glucose Monitor Brands: per insurance. 100 each 6     Allergies   Allergen Reactions    Nitrous Oxide      Doesn't recall any reaction to this drug     Recent Labs   Lab Test 11/21/23  1414  08/13/21  0317 08/11/21  0709 07/20/21  0847 07/20/21  0847 09/22/20  0930 03/02/20  1323 11/01/19  1051 02/12/19  1348 05/31/18  0854   A1C 6.4*  --   --   --  6.7* 6.8*  --  7.7* 7.7*  --    LDL 53  --   --   --  65 75   < >  --  65 55   HDL 45*  --   --   --  45* 48*   < >  --  44* 43*   TRIG 291*  --   --   --  234* 191*   < >  --  183* 227*   ALT  --   --  26  --   --   --   --   --  37  --    CR 1.20* 1.29* 1.57*   < > 1.18* 1.22*  --  1.00 1.12*  --    GFRESTIMATED 47* 41* 33*   < > 46* 44*  --  57* 50*  --    GFRESTBLACK  --   --   --   --   --  52*  --  66 58*  --    POTASSIUM 4.6 4.1 4.6   < > 4.1 5.0  --  4.3 4.1  --    TSH  --   --  0.92  --   --   --   --   --   --  1.52    < > = values in this interval not displayed.      Current providers sharing in care for this patient include:  Patient Care Team:  William Elizondo DO as PCP - General (Internal Medicine)  William Elizondo DO as Assigned PCP    The following health maintenance items are reviewed in Epic and correct as of today:  Health Maintenance   Topic Date Due    Pneumococcal Vaccine: 65+ Years (2 of 2 - PPSV23 or PCV20) 01/02/2017    COLORECTAL CANCER SCREENING  07/30/2019    MICROALBUMIN  09/22/2021    ZOSTER IMMUNIZATION (3 of 3) 10/17/2022    A1C  05/21/2024    RSV VACCINE (1 - 1-dose 75+ series) Never done    INFLUENZA VACCINE (1) 09/01/2024    COVID-19 Vaccine (4 - 2024-25 season) 09/01/2024    BMP  11/21/2024    LIPID  11/21/2024    DIABETIC FOOT EXAM  11/21/2024    ANNUAL REVIEW OF HM ORDERS  11/21/2024    MEDICARE ANNUAL WELLNESS VISIT  11/21/2024    HEMOGLOBIN  11/21/2024    DTAP/TDAP/TD IMMUNIZATION (2 - Td or Tdap) 02/19/2025    EYE EXAM  02/28/2025    FALL RISK ASSESSMENT  11/26/2025    ADVANCE CARE PLANNING  11/21/2028    DEXA  04/08/2030    HEPATITIS C SCREENING  Completed    PHQ-2 (once per calendar year)  Completed    URINALYSIS  Completed    HPV IMMUNIZATION  Aged Out    MENINGITIS IMMUNIZATION  Aged Out     "RSV MONOCLONAL ANTIBODY  Aged Out    MAMMO SCREENING  Discontinued         Review of Systems  CONSTITUTIONAL: NEGATIVE for fever, chills, change in weight  INTEGUMENTARY/SKIN: NEGATIVE for worrisome rashes, moles or lesions  EYES: NEGATIVE for vision changes or irritation  ENT/MOUTH: NEGATIVE for ear, mouth and throat problems  RESP: NEGATIVE for significant cough or SOB  BREAST: NEGATIVE for masses, tenderness or discharge  CV: NEGATIVE for chest pain, palpitations or peripheral edema  GI: NEGATIVE for nausea, abdominal pain, heartburn, or change in bowel habits  : NEGATIVE for frequency, dysuria, or hematuria  MUSCULOSKELETAL: NEGATIVE for significant arthralgias or myalgia  NEURO: NEGATIVE for weakness, dizziness or paresthesias  ENDOCRINE: NEGATIVE for temperature intolerance, skin/hair changes  HEME: NEGATIVE for bleeding problems  PSYCHIATRIC: NEGATIVE for changes in mood or affect     Objective    Exam  /58   Pulse 79   Temp 97.6  F (36.4  C) (Temporal)   Resp 16   Ht 1.615 m (5' 3.58\")   Wt 77.4 kg (170 lb 9.6 oz)   SpO2 95%   BMI 29.67 kg/m     Estimated body mass index is 29.67 kg/m  as calculated from the following:    Height as of this encounter: 1.615 m (5' 3.58\").    Weight as of this encounter: 77.4 kg (170 lb 9.6 oz).    Physical Exam  GENERAL: alert and no distress  EYES: Eyes grossly normal to inspection, PERRL and conjunctivae and sclerae normal  HENT: ear canals and TM's normal, nose and mouth without ulcers or lesions  NECK: no adenopathy, no asymmetry, masses, or scars  RESP: lungs clear to auscultation - no rales, rhonchi or wheezes  CV: regular rate and rhythm, normal S1 S2, no S3 or S4, no murmur, click or rub, no peripheral edema  ABDOMEN: soft, nontender, no hepatosplenomegaly, no masses and bowel sounds normal  MS: no gross musculoskeletal defects noted, no edema  SKIN: no suspicious lesions or rashes  NEURO: Normal strength and tone, mentation intact and speech " normal  PSYCH: mentation appears normal, affect normal/bright  LYMPH: no cervical, supraclavicular, axillary, or inguinal adenopathy  Feet: no evidence of skin breakdown or ulceration is noted.  Sensation is intact to monofilament and vibration.  Pulses are strong, capillary refill is brisk.         No data to display                       I have reviewed the patient's vaccination schedule and discussed the benefits of prophylactic vaccination in detail.  I recommend the patient contact their pharmacist for vaccinations.  Discussed that most insurance companies now favor reimbursement to the pharmacies and it will financially behoove the patient to have vaccinations performed at their pharmacy.        Signed Electronically by: William Elizondo DO

## 2024-11-27 NOTE — RESULT ENCOUNTER NOTE
Dear Mera, your recent test results are attached.    Chemistry panel shows noted a blood sugar of 150 with slightly decreased but stable kidney function.  The cholesterol is within acceptable limits.  The glycosylated hemoglobin is within acceptable limits suggesting adequate blood sugar control.  The liver tests are within acceptable limits.  Blood cell count is within acceptable limits.  There is no evidence of anemia or leukemia.      You will be contacted with any outstanding results as they are available.  Feel free to contact me via the office or My Chart if you have any questions regarding the above.